# Patient Record
Sex: MALE | Race: WHITE | Employment: UNEMPLOYED | URBAN - METROPOLITAN AREA
[De-identification: names, ages, dates, MRNs, and addresses within clinical notes are randomized per-mention and may not be internally consistent; named-entity substitution may affect disease eponyms.]

---

## 2022-01-18 ENCOUNTER — APPOINTMENT (OUTPATIENT)
Dept: CT IMAGING | Age: 52
End: 2022-01-18
Attending: EMERGENCY MEDICINE
Payer: COMMERCIAL

## 2022-01-18 ENCOUNTER — HOSPITAL ENCOUNTER (EMERGENCY)
Age: 52
Discharge: HOME | End: 2022-01-19
Payer: COMMERCIAL

## 2022-01-18 ENCOUNTER — APPOINTMENT (OUTPATIENT)
Dept: GENERAL RADIOLOGY | Age: 52
End: 2022-01-18
Attending: EMERGENCY MEDICINE
Payer: COMMERCIAL

## 2022-01-18 ENCOUNTER — APPOINTMENT (OUTPATIENT)
Dept: CT IMAGING | Age: 52
End: 2022-01-18
Attending: NURSE PRACTITIONER
Payer: COMMERCIAL

## 2022-01-18 DIAGNOSIS — J90 PLEURAL EFFUSION ON LEFT: Primary | ICD-10-CM

## 2022-01-18 DIAGNOSIS — J18.9 PNEUMONIA OF LEFT LOWER LOBE DUE TO INFECTIOUS ORGANISM: ICD-10-CM

## 2022-01-18 LAB
ALBUMIN SERPL-MCNC: 1.7 G/DL (ref 3.5–5)
ALBUMIN/GLOB SERPL: 0.5 {RATIO} (ref 1.1–2.2)
ALP SERPL-CCNC: 90 U/L (ref 45–117)
ALT SERPL-CCNC: 15 U/L (ref 12–78)
ANION GAP SERPL CALC-SCNC: 9 MMOL/L (ref 5–15)
AST SERPL W P-5'-P-CCNC: 28 U/L (ref 15–37)
BASOPHILS # BLD: 0.1 K/UL (ref 0–0.1)
BASOPHILS NFR BLD: 0 % (ref 0–1)
BILIRUB SERPL-MCNC: 1.1 MG/DL (ref 0.2–1)
BUN SERPL-MCNC: 7 MG/DL (ref 6–20)
BUN/CREAT SERPL: 15 (ref 12–20)
CA-I BLD-MCNC: 6.4 MG/DL (ref 8.5–10.1)
CHLORIDE SERPL-SCNC: 97 MMOL/L (ref 97–108)
CK SERPL-CCNC: 120.6 NG/ML (ref 39–308)
CO2 SERPL-SCNC: 19 MMOL/L (ref 21–32)
CREAT SERPL-MCNC: 0.48 MG/DL (ref 0.7–1.3)
DIFFERENTIAL METHOD BLD: ABNORMAL
EOSINOPHIL # BLD: 0 K/UL (ref 0–0.4)
EOSINOPHIL NFR BLD: 0 % (ref 0–7)
ERYTHROCYTE [DISTWIDTH] IN BLOOD BY AUTOMATED COUNT: 13.9 % (ref 11.5–14.5)
GLOBULIN SER CALC-MCNC: 3.7 G/DL (ref 2–4)
GLUCOSE SERPL-MCNC: 100 MG/DL (ref 65–100)
HCT VFR BLD AUTO: 37.5 % (ref 36.6–50.3)
HGB BLD-MCNC: 13.3 G/DL (ref 12.1–17)
IMM GRANULOCYTES # BLD AUTO: 0.1 K/UL (ref 0–0.04)
IMM GRANULOCYTES NFR BLD AUTO: 1 % (ref 0–0.5)
LYMPHOCYTES # BLD: 1.3 K/UL (ref 0.8–3.5)
LYMPHOCYTES NFR BLD: 8 % (ref 12–49)
MCH RBC QN AUTO: 32.2 PG (ref 26–34)
MCHC RBC AUTO-ENTMCNC: 35.5 G/DL (ref 30–36.5)
MCV RBC AUTO: 90.8 FL (ref 80–99)
MONOCYTES # BLD: 0.8 K/UL (ref 0–1)
MONOCYTES NFR BLD: 5 % (ref 5–13)
NEUTS SEG # BLD: 13.7 K/UL (ref 1.8–8)
NEUTS SEG NFR BLD: 86 % (ref 32–75)
NRBC # BLD: 0 K/UL (ref 0–0.01)
NRBC BLD-RTO: 0 PER 100 WBC
PLATELET # BLD AUTO: 323 K/UL (ref 150–400)
PMV BLD AUTO: 8.9 FL (ref 8.9–12.9)
POTASSIUM SERPL-SCNC: 3.7 MMOL/L (ref 3.5–5.1)
PROT SERPL-MCNC: 5.4 G/DL (ref 6.4–8.2)
RBC # BLD AUTO: 4.13 M/UL (ref 4.1–5.7)
SODIUM SERPL-SCNC: 125 MMOL/L (ref 136–145)
TROPONIN-HIGH SENSITIVITY: 15 NG/L (ref 0–76)
WBC # BLD AUTO: 15.9 K/UL (ref 4.1–11.1)

## 2022-01-18 PROCEDURE — 70450 CT HEAD/BRAIN W/O DYE: CPT

## 2022-01-18 PROCEDURE — 96361 HYDRATE IV INFUSION ADD-ON: CPT

## 2022-01-18 PROCEDURE — 74011250636 HC RX REV CODE- 250/636: Performed by: NURSE PRACTITIONER

## 2022-01-18 PROCEDURE — 80053 COMPREHEN METABOLIC PANEL: CPT

## 2022-01-18 PROCEDURE — 85025 COMPLETE CBC W/AUTO DIFF WBC: CPT

## 2022-01-18 PROCEDURE — 71045 X-RAY EXAM CHEST 1 VIEW: CPT

## 2022-01-18 PROCEDURE — 93005 ELECTROCARDIOGRAM TRACING: CPT

## 2022-01-18 PROCEDURE — 96374 THER/PROPH/DIAG INJ IV PUSH: CPT

## 2022-01-18 PROCEDURE — 74011250637 HC RX REV CODE- 250/637: Performed by: NURSE PRACTITIONER

## 2022-01-18 PROCEDURE — 82550 ASSAY OF CK (CPK): CPT

## 2022-01-18 PROCEDURE — 84484 ASSAY OF TROPONIN QUANT: CPT

## 2022-01-18 PROCEDURE — 74011000250 HC RX REV CODE- 250: Performed by: NURSE PRACTITIONER

## 2022-01-18 PROCEDURE — 96375 TX/PRO/DX INJ NEW DRUG ADDON: CPT

## 2022-01-18 PROCEDURE — 74011000258 HC RX REV CODE- 258: Performed by: NURSE PRACTITIONER

## 2022-01-18 PROCEDURE — 99284 EMERGENCY DEPT VISIT MOD MDM: CPT

## 2022-01-18 PROCEDURE — 36415 COLL VENOUS BLD VENIPUNCTURE: CPT

## 2022-01-18 RX ORDER — LISINOPRIL 20 MG/1
20 TABLET ORAL DAILY
COMMUNITY

## 2022-01-18 RX ORDER — LIDOCAINE HYDROCHLORIDE 20 MG/ML
15 SOLUTION OROPHARYNGEAL
Status: COMPLETED | OUTPATIENT
Start: 2022-01-18 | End: 2022-01-18

## 2022-01-18 RX ORDER — MAG HYDROX/ALUMINUM HYD/SIMETH 200-200-20
30 SUSPENSION, ORAL (FINAL DOSE FORM) ORAL ONCE
Status: COMPLETED | OUTPATIENT
Start: 2022-01-18 | End: 2022-01-18

## 2022-01-18 RX ORDER — ONDANSETRON 2 MG/ML
4 INJECTION INTRAMUSCULAR; INTRAVENOUS
Status: COMPLETED | OUTPATIENT
Start: 2022-01-18 | End: 2022-01-18

## 2022-01-18 RX ORDER — PREGABALIN 100 MG/1
CAPSULE ORAL 2 TIMES DAILY
Status: ON HOLD | COMMUNITY
End: 2022-04-03

## 2022-01-18 RX ADMIN — ALUMINUM HYDROXIDE, MAGNESIUM HYDROXIDE, AND SIMETHICONE 30 ML: 200; 200; 20 SUSPENSION ORAL at 22:32

## 2022-01-18 RX ADMIN — SODIUM CHLORIDE 1000 ML: 9 INJECTION, SOLUTION INTRAVENOUS at 23:18

## 2022-01-18 RX ADMIN — FAMOTIDINE 20 MG: 10 INJECTION INTRAVENOUS at 23:16

## 2022-01-18 RX ADMIN — ONDANSETRON 4 MG: 2 INJECTION INTRAMUSCULAR; INTRAVENOUS at 23:16

## 2022-01-18 RX ADMIN — LIDOCAINE HYDROCHLORIDE 15 ML: 20 SOLUTION ORAL; TOPICAL at 22:32

## 2022-01-19 ENCOUNTER — APPOINTMENT (OUTPATIENT)
Dept: CT IMAGING | Age: 52
End: 2022-01-19
Attending: NURSE PRACTITIONER
Payer: COMMERCIAL

## 2022-01-19 VITALS
BODY MASS INDEX: 31.83 KG/M2 | HEART RATE: 96 BPM | RESPIRATION RATE: 20 BRPM | WEIGHT: 210 LBS | TEMPERATURE: 98.6 F | DIASTOLIC BLOOD PRESSURE: 90 MMHG | SYSTOLIC BLOOD PRESSURE: 181 MMHG | HEIGHT: 68 IN | OXYGEN SATURATION: 97 %

## 2022-01-19 PROCEDURE — 74011000636 HC RX REV CODE- 636: Performed by: NURSE PRACTITIONER

## 2022-01-19 PROCEDURE — 74177 CT ABD & PELVIS W/CONTRAST: CPT

## 2022-01-19 PROCEDURE — 74011250637 HC RX REV CODE- 250/637: Performed by: NURSE PRACTITIONER

## 2022-01-19 RX ORDER — LEVOFLOXACIN 5 MG/ML
500 INJECTION, SOLUTION INTRAVENOUS ONCE
Status: DISCONTINUED | OUTPATIENT
Start: 2022-01-19 | End: 2022-01-19

## 2022-01-19 RX ORDER — PROMETHAZINE HYDROCHLORIDE AND DEXTROMETHORPHAN HYDROBROMIDE 6.25; 15 MG/5ML; MG/5ML
5 SYRUP ORAL
Qty: 120 ML | Refills: 0 | Status: SHIPPED | OUTPATIENT
Start: 2022-01-19 | End: 2022-01-26

## 2022-01-19 RX ORDER — AMOXICILLIN AND CLAVULANATE POTASSIUM 875; 125 MG/1; MG/1
1 TABLET, FILM COATED ORAL
Status: COMPLETED | OUTPATIENT
Start: 2022-01-19 | End: 2022-01-19

## 2022-01-19 RX ORDER — AMOXICILLIN AND CLAVULANATE POTASSIUM 875; 125 MG/1; MG/1
1 TABLET, FILM COATED ORAL 2 TIMES DAILY
Qty: 14 TABLET | Refills: 0 | Status: ON HOLD | OUTPATIENT
Start: 2022-01-19 | End: 2022-04-03

## 2022-01-19 RX ORDER — OMEPRAZOLE 40 MG/1
40 CAPSULE, DELAYED RELEASE ORAL DAILY
Qty: 30 CAPSULE | Refills: 0 | Status: ON HOLD | OUTPATIENT
Start: 2022-01-19 | End: 2022-04-03

## 2022-01-19 RX ORDER — LEVOFLOXACIN 500 MG/1
500 TABLET, FILM COATED ORAL DAILY
Qty: 7 TABLET | Refills: 0 | Status: ON HOLD | OUTPATIENT
Start: 2022-01-19 | End: 2022-04-03

## 2022-01-19 RX ORDER — LEVOFLOXACIN 500 MG/1
500 TABLET, FILM COATED ORAL
Status: COMPLETED | OUTPATIENT
Start: 2022-01-19 | End: 2022-01-19

## 2022-01-19 RX ORDER — ONDANSETRON 4 MG/1
4 TABLET, FILM COATED ORAL
Qty: 10 TABLET | Refills: 0 | Status: ON HOLD | OUTPATIENT
Start: 2022-01-19 | End: 2022-04-03

## 2022-01-19 RX ADMIN — IOPAMIDOL 100 ML: 755 INJECTION, SOLUTION INTRAVENOUS at 00:35

## 2022-01-19 RX ADMIN — AMOXICILLIN AND CLAVULANATE POTASSIUM 1 TABLET: 875; 125 TABLET, FILM COATED ORAL at 02:27

## 2022-01-19 RX ADMIN — LEVOFLOXACIN 500 MG: 500 TABLET, FILM COATED ORAL at 02:27

## 2022-01-19 NOTE — ED PROVIDER NOTES
EMERGENCY DEPARTMENT HISTORY AND PHYSICAL EXAM      Date: 1/18/2022  Patient Name: Shon Blakely III    History of Presenting Illness     Chief Complaint   Patient presents with    Dizziness       History Provided By: Patient    HPI: Shon Blakely III, 54 y.o. male with a past medical history significant hypertension and obesity presents to the ED with cc of syncopal episode. Patient was diagnosed with COVID 3 weeks ago. Patient states he had a test since that was negative. Patient had a presyncopal episode today. Patient called EMS. Patient also complains of GERD. Patient denies shortness of breath. Moderate severity, no known exacerbating or relieving factors, no other associated signs and symptoms    There are no other complaints, changes, or physical findings at this time. PCP: None    No current facility-administered medications on file prior to encounter. Current Outpatient Medications on File Prior to Encounter   Medication Sig Dispense Refill    lisinopriL (PRINIVIL, ZESTRIL) 20 mg tablet Take 20 mg by mouth daily.  pregabalin (Lyrica) 100 mg capsule Take  by mouth two (2) times a day. Pt unsure of dosage         Past History     Past Medical History:  Past Medical History:   Diagnosis Date    COPD (chronic obstructive pulmonary disease) ([facility] [address]     Hypertension        Past Surgical History:  History reviewed. No pertinent surgical history. Family History:  History reviewed. No pertinent family history. Social History:  Social History     Tobacco Use    Smoking status: Current Every Day Smoker     Packs/day: 1.00    Smokeless tobacco: Never Used   Substance Use Topics    Alcohol use: Not on file    Drug use: Not on file       Allergies:  No Known Allergies      Review of Systems     Review of Systems   Constitutional: Positive for fatigue. Negative for chills and fever. HENT: Negative for dental problem and sore throat.     Eyes: Negative for pain and visual disturbance. Respiratory: Negative for cough and chest tightness. Cardiovascular: Negative for chest pain. Gastrointestinal: Positive for vomiting. Negative for diarrhea and nausea. Genitourinary: Negative for difficulty urinating and frequency. Musculoskeletal: Negative for gait problem and joint swelling. Neurological: Positive for dizziness. Negative for numbness and headaches. Hematological: Negative for adenopathy. Does not bruise/bleed easily. Psychiatric/Behavioral: Negative for behavioral problems and suicidal ideas. Physical Exam     Physical Exam  Constitutional:       General: He is not in acute distress. Appearance: Normal appearance. He is obese. He is not ill-appearing or toxic-appearing. HENT:      Head: Normocephalic and atraumatic. Nose: Nose normal.      Mouth/Throat:      Mouth: Mucous membranes are moist.   Eyes:      Extraocular Movements: Extraocular movements intact. Pupils: Pupils are equal, round, and reactive to light. Cardiovascular:      Rate and Rhythm: Normal rate and regular rhythm. Pulmonary:      Effort: Pulmonary effort is normal.      Breath sounds: Normal breath sounds. Abdominal:      General: Bowel sounds are normal.   Musculoskeletal:         General: Normal range of motion. Cervical back: Normal range of motion and neck supple. Skin:     General: Skin is warm and dry. Capillary Refill: Capillary refill takes less than 2 seconds. Neurological:      General: No focal deficit present. Mental Status: He is alert and oriented to person, place, and time.    Psychiatric:         Mood and Affect: Mood normal.         Behavior: Behavior normal.         Lab and Diagnostic Study Results     Labs -     Recent Results (from the past 12 hour(s))   CBC WITH AUTOMATED DIFF    Collection Time: 01/18/22 10:01 PM   Result Value Ref Range    WBC 15.9 (H) 4.1 - 11.1 K/uL    RBC 4.13 4.10 - 5.70 M/uL    HGB 13.3 12.1 - 17.0 g/dL    HCT 37.5 36.6 - 50.3 %    MCV 90.8 80.0 - 99.0 FL    MCH 32.2 26.0 - 34.0 PG    MCHC 35.5 30.0 - 36.5 g/dL    RDW 13.9 11.5 - 14.5 %    PLATELET 739 238 - 590 K/uL    MPV 8.9 8.9 - 12.9 FL    NRBC 0.0 0.0  WBC    ABSOLUTE NRBC 0.00 0.00 - 0.01 K/uL    NEUTROPHILS 86 (H) 32 - 75 %    LYMPHOCYTES 8 (L) 12 - 49 %    MONOCYTES 5 5 - 13 %    EOSINOPHILS 0 0 - 7 %    BASOPHILS 0 0 - 1 %    IMMATURE GRANULOCYTES 1 (H) 0 - 0.5 %    ABS. NEUTROPHILS 13.7 (H) 1.8 - 8.0 K/UL    ABS. LYMPHOCYTES 1.3 0.8 - 3.5 K/UL    ABS. MONOCYTES 0.8 0.0 - 1.0 K/UL    ABS. EOSINOPHILS 0.0 0.0 - 0.4 K/UL    ABS. BASOPHILS 0.1 0.0 - 0.1 K/UL    ABS. IMM. GRANS. 0.1 (H) 0.00 - 0.04 K/UL    DF AUTOMATED     METABOLIC PANEL, COMPREHENSIVE    Collection Time: 01/18/22 10:01 PM   Result Value Ref Range    Sodium 125 (L) 136 - 145 mmol/L    Potassium 3.7 3.5 - 5.1 mmol/L    Chloride 97 97 - 108 mmol/L    CO2 19 (L) 21 - 32 mmol/L    Anion gap 9 5 - 15 mmol/L    Glucose 100 65 - 100 mg/dL    BUN 7 6 - 20 mg/dL    Creatinine 0.48 (L) 0.70 - 1.30 mg/dL    BUN/Creatinine ratio 15 12 - 20      GFR est AA >60 >60 ml/min/1.73m2    GFR est non-AA >60 >60 ml/min/1.73m2    Calcium 6.4 (LL) 8.5 - 10.1 mg/dL    Bilirubin, total 1.1 (H) 0.2 - 1.0 mg/dL    AST (SGOT) 28 15 - 37 U/L    ALT (SGPT) 15 12 - 78 U/L    Alk. phosphatase 90 45 - 117 U/L    Protein, total 5.4 (L) 6.4 - 8.2 g/dL    Albumin 1.7 (L) 3.5 - 5.0 g/dL    Globulin 3.7 2.0 - 4.0 g/dL    A-G Ratio 0.5 (L) 1.1 - 2.2     CK W/ REFLX CKMB    Collection Time: 01/18/22 10:01 PM   Result Value Ref Range    .6 39 - 308 ng/mL   TROPONIN-HIGH SENSITIVITY    Collection Time: 01/18/22 10:01 PM   Result Value Ref Range    Troponin-High Sensitivity 15 0 - 76 ng/L       Radiologic Studies -   @lastxrresult@  CT Results  (Last 48 hours)               01/19/22 0034  CT ABD PELV W CONT Final result    Impression:      Proximal-mid small bowel wall thickening may represent enteritis.  Inflammatory, infectious or other etiologies may be considered in the differential diagnosis. Left pleural effusion. Empyema cannot be excluded. Adjacent opacity may   represent atelectasis, pneumonia or a combination of these. Fatty liver. Atherosclerosis. Renal lesions are too small to characterize. Avascular necrosis of the left femoral head. Follow-up as clinically indicated. Please see full report. Narrative:  CT abdomen and pelvis with intravenous contrast, 100 cc [id number]       Dose reduction: All CT scans at this facility are performed using dose reduction   optimization techniques as appropriate to a performed exam including the   following: Automated exposure control, adjustments of the mA and/or kV according   to patient size, or use of iterative reconstruction technique. INDICATION: Vomiting       FINDINGS:       Small to moderate left-sided pleural effusion with mild pleural enhancement and   adjacent opacity. Minimal atelectasis right lung base. There may be mild   pulmonary emphysema. Probable coronary artery calcification. Mild partially   visualized bilateral gynecomastia. Fatty infiltration of the liver. Spleen is slightly prominent in size. Pancreas   is unremarkable. Gallbladder is present without pericholecystic stranding. No   hydronephrosis on either side. Bilateral subcentimeter renal lesions are too   small to characterize, may represent cyst, hyperdense cyst or other. No aneurysm   of abdominal aorta. Atherosclerosis. There is wall thickening in the proximal-mid small bowel loops with haziness and   fluid in the adjacent mesentery. No shaunna bowel obstruction. There may be few   colonic diverticula without acute diverticulitis. No inflammatory changes to   suggest appendicitis. Small amount of ascites. No abscess. No free   intraperitoneal air. Prostate appears grossly unremarkable based on CT criteria. Artifacts from right hip prosthesis.  Avascular necrosis of the left femoral   head. Ankylosis of the spine and fusion of the sacroiliac joints may be due to   ankylosing spondylitis or other. 01/18/22 2221  CT HEAD WO CONT Final result    Impression:  No acute intracranial abnormality. Narrative:  Study: Head CT without contrast.       Clinical indication: Syncope. Technique: Routine volume acquisition of the head was obtained without IV   contrast. Coronal and sagittal reformations were generated in soft tissue and   bone kernels. Dose reduction: All CT scans at this facility are performed using   dose reduction optimization techniques as appropriate to a performed exam   including the following: Automated exposure control, adjustments of the mA   and/or kV according to patient size, or use of iterative reconstruction   technique. Comparison: Head CT 4/25/2009       Findings:        No evidence of acute intracranial hemorrhage, mass effect, midline shift or   abnormal extra-axial fluid collection. Ventricles and basal cisterns are   preserved and are symmetric. Gray-white matter differentiation is maintained. Moderate intracranial vascular calcifications. No evidence of skull fracture. Visualized paranasal sinuses and mastoid air   cells are clear. Globes and orbits are intact. CXR Results  (Last 48 hours)               01/18/22 2211  XR CHEST PORT Final result    Impression:  Findings/impression:       Cardiac silhouette is enlarged. Mild central vascular congestion. Small left pleural effusion and hazy left basilar airspace disease. No evidence   of pneumothorax. Cervicothoracic fusion hardware partially visualized. Narrative:  Study: XR CHEST PORT       Clinical indication: chest pain       Comparison: None. Medical Decision Making   - I am the first provider for this patient.     - I reviewed the vital signs, available nursing notes, past medical history, past surgical history, family history and social history. - Initial assessment performed. The patients presenting problems have been discussed, and they are in agreement with the care plan formulated and outlined with them. I have encouraged them to ask questions as they arise throughout their visit. Vital Signs-Reviewed the patient's vital signs. Patient Vitals for the past 12 hrs:   Temp Pulse Resp BP SpO2   01/19/22 0231  96 20 (!) 181/90 97 %   01/18/22 2152 98.6 °F (37 °C) 89 20 (!) 143/80 96 %       Records Reviewed: Nursing Notes and Old Medical Records          ED Course:     ED Course as of 01/19/22 0358   Tue Jan 18, 2022   2246 Bilirubin, total(!): 1.1 [CB]   2246 Sodium(!): 125 [CB]      ED Course User Index  [CB] Alan Corado NP       Provider Notes (Medical Decision Making):   Patient presenting with dizziness. Pt has stable vitals with a nonfocal exam. DDx is broad and includes dehydration, orthostatic hypotension, arrhythmia, electrolyte disturbance, ACS, BPPV, labyrinthitis, otitis media, medication toxicity. There are no red flag symptoms such as diplopia, dysmetria, dysarthria, ataxia or unilateral numbness or weakness. Will obtain EKG, labs, check orthostatics, provide IVF's and symptomatic treatment and reassess. MDM  Patient feels better after receiving IV fluids. Patient found to have a left lower lobe pneumonia. Patient also has a pleural effusion and. Patient has a follow-up appointment with his PCP at 9:15 in the morning. Patient instructed on return precautions. Patient given Levaquin and Augmentin in ER. Patient also has a history of hyponatremia. Patient states 125 is not a bad level for him. Patient only vomited after receiving GI cocktail. Patient had no further episodes of nausea after vomiting. Procedures   Medical Decision Makingedical Decision Making  Performed by: Kevon Martell NP  PROCEDURES:  Procedures       Disposition   Disposition: DC- Adult Discharges:  All of the diagnostic tests were reviewed and questions answered. Diagnosis, care plan and treatment options were discussed. The patient understands the instructions and will follow up as directed. The patients results have been reviewed with them. They have been counseled regarding their diagnosis. The patient verbally convey understanding and agreement of the signs, symptoms, diagnosis, treatment and prognosis and additionally agrees to follow up as recommended with their PCP in 24 - 48 hours. They also agree with the care-plan and convey that all of their questions have been answered. I have also put together some discharge instructions for them that include: 1) educational information regarding their diagnosis, 2) how to care for their diagnosis at home, as well a 3) list of reasons why they would want to return to the ED prior to their follow-up appointment, should their condition change. Discharged    DISCHARGE PLAN:  1. Cannot display discharge medications since this patient is not currently admitted. 2.   Follow-up Information    None       3. Return to ED if worse   4. Discharge Medication List as of 1/19/2022  2:18 AM      START taking these medications    Details   omeprazole (PRILOSEC) 40 mg capsule Take 1 Capsule by mouth daily. , Normal, Disp-30 Capsule, R-0      levoFLOXacin (Levaquin) 500 mg tablet Take 1 Tablet by mouth daily. , Normal, Disp-7 Tablet, R-0      amoxicillin-clavulanate (Augmentin) 875-125 mg per tablet Take 1 Tablet by mouth two (2) times a day., Normal, Disp-14 Tablet, R-0      promethazine-dextromethorphan (PROMETHAZINE-DM) 6.25-15 mg/5 mL syrup Take 5 mL by mouth every four (4) hours as needed for Cough for up to 7 days. , Normal, Disp-120 mL, R-0      ondansetron hcl (ZOFRAN) 4 mg tablet Take 1 Tablet by mouth every eight (8) hours as needed for Nausea or Vomiting., Normal, Disp-10 Tablet, R-0         CONTINUE these medications which have NOT CHANGED    Details   lisinopriL (PRINIVIL, ZESTRIL) 20 mg tablet Take 20 mg by mouth daily. , Historical Med      pregabalin (Lyrica) 100 mg capsule Take  by mouth two (2) times a day. Pt unsure of dosage, Historical Med               Diagnosis     Clinical Impression:   1. Pleural effusion on left    2. Pneumonia of left lower lobe due to infectious organism        Attestations:    Georgiana Davison, NP    Please note that this dictation was completed with [employer]ent, the computer voice recognition software. Quite often unanticipated grammatical, syntax, homophones, and other interpretive errors are inadvertently transcribed by the computer software. Please disregard these errors. Please excuse any errors that have escaped final proofreading. Thank you.

## 2022-01-20 LAB
ATRIAL RATE: 91 BPM
CALCULATED P AXIS, ECG09: 47 DEGREES
CALCULATED R AXIS, ECG10: 39 DEGREES
CALCULATED T AXIS, ECG11: 70 DEGREES
DIAGNOSIS, 93000: NORMAL
P-R INTERVAL, ECG05: 146 MS
Q-T INTERVAL, ECG07: 402 MS
QRS DURATION, ECG06: 84 MS
QTC CALCULATION (BEZET), ECG08: 494 MS
VENTRICULAR RATE, ECG03: 91 BPM

## 2022-04-03 ENCOUNTER — HOSPITAL ENCOUNTER (OUTPATIENT)
Age: 52
Setting detail: OBSERVATION
Discharge: HOME | End: 2022-04-04
Attending: EMERGENCY MEDICINE | Admitting: INTERNAL MEDICINE
Payer: COMMERCIAL

## 2022-04-03 ENCOUNTER — APPOINTMENT (OUTPATIENT)
Dept: GENERAL RADIOLOGY | Age: 52
End: 2022-04-03
Attending: EMERGENCY MEDICINE
Payer: COMMERCIAL

## 2022-04-03 DIAGNOSIS — F10.930 ALCOHOL WITHDRAWAL SYNDROME WITHOUT COMPLICATION (HCC): ICD-10-CM

## 2022-04-03 DIAGNOSIS — E87.1 HYPONATREMIA: Primary | ICD-10-CM

## 2022-04-03 LAB
ALBUMIN SERPL-MCNC: 3.3 G/DL (ref 3.5–5)
ALBUMIN SERPL-MCNC: 3.8 G/DL (ref 3.5–5)
ALBUMIN/GLOB SERPL: 0.7 {RATIO} (ref 1.1–2.2)
ALP SERPL-CCNC: 162 U/L (ref 45–117)
ALT SERPL-CCNC: 28 U/L (ref 12–78)
AMORPH CRY URNS QL MICRO: ABNORMAL
ANION GAP SERPL CALC-SCNC: 11 MMOL/L (ref 5–15)
ANION GAP SERPL CALC-SCNC: 12 MMOL/L (ref 5–15)
APPEARANCE UR: CLEAR
AST SERPL W P-5'-P-CCNC: 37 U/L (ref 15–37)
BACTERIA URNS QL MICRO: NEGATIVE /HPF
BASOPHILS # BLD: 0 K/UL (ref 0–0.1)
BASOPHILS NFR BLD: 0 % (ref 0–1)
BILIRUB SERPL-MCNC: 2.2 MG/DL (ref 0.2–1)
BILIRUB UR QL: NEGATIVE
BUN SERPL-MCNC: 7 MG/DL (ref 6–20)
BUN SERPL-MCNC: 7 MG/DL (ref 6–20)
BUN/CREAT SERPL: 8 (ref 12–20)
BUN/CREAT SERPL: 9 (ref 12–20)
CA-I BLD-MCNC: 8.7 MG/DL (ref 8.5–10.1)
CA-I BLD-MCNC: 9.1 MG/DL (ref 8.5–10.1)
CHLORIDE SERPL-SCNC: 79 MMOL/L (ref 97–108)
CHLORIDE SERPL-SCNC: 81 MMOL/L (ref 97–108)
CK SERPL-CCNC: 273 U/L (ref 39–308)
CO2 SERPL-SCNC: 28 MMOL/L (ref 21–32)
CO2 SERPL-SCNC: 28 MMOL/L (ref 21–32)
COLOR UR: YELLOW
CREAT SERPL-MCNC: 0.78 MG/DL (ref 0.7–1.3)
CREAT SERPL-MCNC: 0.9 MG/DL (ref 0.7–1.3)
CREAT UR-MCNC: 117 MG/DL
DIFFERENTIAL METHOD BLD: ABNORMAL
EOSINOPHIL # BLD: 0 K/UL (ref 0–0.4)
EOSINOPHIL NFR BLD: 0 % (ref 0–7)
EPITH CASTS URNS QL MICRO: ABNORMAL /LPF
ERYTHROCYTE [DISTWIDTH] IN BLOOD BY AUTOMATED COUNT: 14.1 % (ref 11.5–14.5)
GLOBULIN SER CALC-MCNC: 5.5 G/DL (ref 2–4)
GLUCOSE SERPL-MCNC: 154 MG/DL (ref 65–100)
GLUCOSE SERPL-MCNC: 172 MG/DL (ref 65–100)
GLUCOSE UR STRIP.AUTO-MCNC: 100 MG/DL
HCT VFR BLD AUTO: 40.3 % (ref 36.6–50.3)
HGB BLD-MCNC: 14.6 G/DL (ref 12.1–17)
HGB UR QL STRIP: NEGATIVE
IMM GRANULOCYTES # BLD AUTO: 0 K/UL (ref 0–0.04)
IMM GRANULOCYTES NFR BLD AUTO: 0 % (ref 0–0.5)
KETONES UR QL STRIP.AUTO: 15 MG/DL
LEUKOCYTE ESTERASE UR QL STRIP.AUTO: NEGATIVE
LYMPHOCYTES # BLD: 0.6 K/UL (ref 0.8–3.5)
LYMPHOCYTES NFR BLD: 8 % (ref 12–49)
MCH RBC QN AUTO: 33.2 PG (ref 26–34)
MCHC RBC AUTO-ENTMCNC: 36.2 G/DL (ref 30–36.5)
MCV RBC AUTO: 91.6 FL (ref 80–99)
MONOCYTES # BLD: 0.5 K/UL (ref 0–1)
MONOCYTES NFR BLD: 6 % (ref 5–13)
NEUTS SEG # BLD: 6.4 K/UL (ref 1.8–8)
NEUTS SEG NFR BLD: 86 % (ref 32–75)
NITRITE UR QL STRIP.AUTO: NEGATIVE
PH UR STRIP: 8 [PH] (ref 5–8)
PHOSPHATE SERPL-MCNC: 3.1 MG/DL (ref 2.6–4.7)
PLATELET # BLD AUTO: 121 K/UL (ref 150–400)
PMV BLD AUTO: 8.6 FL (ref 8.9–12.9)
POTASSIUM SERPL-SCNC: 3.4 MMOL/L (ref 3.5–5.1)
POTASSIUM SERPL-SCNC: 3.4 MMOL/L (ref 3.5–5.1)
PROT SERPL-MCNC: 9.3 G/DL (ref 6.4–8.2)
PROT UR STRIP-MCNC: ABNORMAL MG/DL
RBC # BLD AUTO: 4.4 M/UL (ref 4.1–5.7)
RBC #/AREA URNS HPF: ABNORMAL /HPF (ref 0–5)
SODIUM SERPL-SCNC: 119 MMOL/L (ref 136–145)
SODIUM SERPL-SCNC: 120 MMOL/L (ref 136–145)
SODIUM UR-SCNC: 33 MMOL/L
SP GR UR REFRACTOMETRY: 1.01 (ref 1–1.03)
TROPONIN-HIGH SENSITIVITY: 38 NG/L (ref 0–76)
TSH SERPL DL<=0.05 MIU/L-ACNC: 0.95 UIU/ML (ref 0.36–3.74)
URATE SERPL-MCNC: 3.4 MG/DL (ref 3.5–7.2)
UROBILINOGEN UR QL STRIP.AUTO: 0.1 EU/DL (ref 0.2–1)
WBC # BLD AUTO: 7.5 K/UL (ref 4.1–11.1)
WBC URNS QL MICRO: ABNORMAL /HPF (ref 0–4)

## 2022-04-03 PROCEDURE — 84550 ASSAY OF BLOOD/URIC ACID: CPT

## 2022-04-03 PROCEDURE — 74011000250 HC RX REV CODE- 250: Performed by: HOSPITALIST

## 2022-04-03 PROCEDURE — 71045 X-RAY EXAM CHEST 1 VIEW: CPT

## 2022-04-03 PROCEDURE — 85025 COMPLETE CBC W/AUTO DIFF WBC: CPT

## 2022-04-03 PROCEDURE — 36415 COLL VENOUS BLD VENIPUNCTURE: CPT

## 2022-04-03 PROCEDURE — 96372 THER/PROPH/DIAG INJ SC/IM: CPT

## 2022-04-03 PROCEDURE — 74011250637 HC RX REV CODE- 250/637: Performed by: HOSPITALIST

## 2022-04-03 PROCEDURE — 74011250636 HC RX REV CODE- 250/636: Performed by: HOSPITALIST

## 2022-04-03 PROCEDURE — 99285 EMERGENCY DEPT VISIT HI MDM: CPT

## 2022-04-03 PROCEDURE — 82570 ASSAY OF URINE CREATININE: CPT

## 2022-04-03 PROCEDURE — 84484 ASSAY OF TROPONIN QUANT: CPT

## 2022-04-03 PROCEDURE — 96361 HYDRATE IV INFUSION ADD-ON: CPT

## 2022-04-03 PROCEDURE — 74011250636 HC RX REV CODE- 250/636: Performed by: EMERGENCY MEDICINE

## 2022-04-03 PROCEDURE — 84443 ASSAY THYROID STIM HORMONE: CPT

## 2022-04-03 PROCEDURE — 80053 COMPREHEN METABOLIC PANEL: CPT

## 2022-04-03 PROCEDURE — 82550 ASSAY OF CK (CPK): CPT

## 2022-04-03 PROCEDURE — G0378 HOSPITAL OBSERVATION PER HR: HCPCS

## 2022-04-03 PROCEDURE — 80069 RENAL FUNCTION PANEL: CPT

## 2022-04-03 PROCEDURE — 84300 ASSAY OF URINE SODIUM: CPT

## 2022-04-03 PROCEDURE — 74011250637 HC RX REV CODE- 250/637: Performed by: EMERGENCY MEDICINE

## 2022-04-03 PROCEDURE — 96374 THER/PROPH/DIAG INJ IV PUSH: CPT

## 2022-04-03 PROCEDURE — 65270000029 HC RM PRIVATE

## 2022-04-03 PROCEDURE — 94640 AIRWAY INHALATION TREATMENT: CPT

## 2022-04-03 PROCEDURE — 93005 ELECTROCARDIOGRAM TRACING: CPT

## 2022-04-03 PROCEDURE — 96375 TX/PRO/DX INJ NEW DRUG ADDON: CPT

## 2022-04-03 PROCEDURE — 81001 URINALYSIS AUTO W/SCOPE: CPT

## 2022-04-03 RX ORDER — ASPIRIN 325 MG/1
100 TABLET, FILM COATED ORAL DAILY
Status: DISCONTINUED | OUTPATIENT
Start: 2022-04-04 | End: 2022-04-04 | Stop reason: HOSPADM

## 2022-04-03 RX ORDER — IBUPROFEN 200 MG
1 TABLET ORAL DAILY
Status: DISCONTINUED | OUTPATIENT
Start: 2022-04-04 | End: 2022-04-04 | Stop reason: HOSPADM

## 2022-04-03 RX ORDER — ALBUTEROL SULFATE 90 UG/1
2 AEROSOL, METERED RESPIRATORY (INHALATION)
Status: DISCONTINUED | OUTPATIENT
Start: 2022-04-03 | End: 2022-04-04 | Stop reason: HOSPADM

## 2022-04-03 RX ORDER — LISINOPRIL 10 MG/1
20 TABLET ORAL
Status: COMPLETED | OUTPATIENT
Start: 2022-04-03 | End: 2022-04-03

## 2022-04-03 RX ORDER — CHLORDIAZEPOXIDE HYDROCHLORIDE 25 MG/1
25 CAPSULE, GELATIN COATED ORAL 4 TIMES DAILY
Status: DISCONTINUED | OUTPATIENT
Start: 2022-04-03 | End: 2022-04-04 | Stop reason: HOSPADM

## 2022-04-03 RX ORDER — DOCUSATE SODIUM 100 MG/1
100 CAPSULE, LIQUID FILLED ORAL 2 TIMES DAILY
Status: DISCONTINUED | OUTPATIENT
Start: 2022-04-03 | End: 2022-04-04 | Stop reason: HOSPADM

## 2022-04-03 RX ORDER — SODIUM CHLORIDE 9 MG/ML
1000 INJECTION, SOLUTION INTRAVENOUS CONTINUOUS
Status: DISCONTINUED | OUTPATIENT
Start: 2022-04-03 | End: 2022-04-03

## 2022-04-03 RX ORDER — LABETALOL HCL 20 MG/4 ML
20 SYRINGE (ML) INTRAVENOUS ONCE
Status: COMPLETED | OUTPATIENT
Start: 2022-04-03 | End: 2022-04-03

## 2022-04-03 RX ORDER — ENOXAPARIN SODIUM 100 MG/ML
40 INJECTION SUBCUTANEOUS EVERY 24 HOURS
Status: DISCONTINUED | OUTPATIENT
Start: 2022-04-03 | End: 2022-04-04 | Stop reason: HOSPADM

## 2022-04-03 RX ORDER — PANTOPRAZOLE SODIUM 40 MG/1
40 TABLET, DELAYED RELEASE ORAL
Status: DISCONTINUED | OUTPATIENT
Start: 2022-04-04 | End: 2022-04-04 | Stop reason: HOSPADM

## 2022-04-03 RX ORDER — SODIUM CHLORIDE 0.9 % (FLUSH) 0.9 %
5-40 SYRINGE (ML) INJECTION AS NEEDED
Status: DISCONTINUED | OUTPATIENT
Start: 2022-04-03 | End: 2022-04-04 | Stop reason: HOSPADM

## 2022-04-03 RX ORDER — SODIUM CHLORIDE 0.9 % (FLUSH) 0.9 %
5-40 SYRINGE (ML) INJECTION EVERY 8 HOURS
Status: DISCONTINUED | OUTPATIENT
Start: 2022-04-03 | End: 2022-04-04 | Stop reason: HOSPADM

## 2022-04-03 RX ORDER — ALBUTEROL SULFATE 90 UG/1
2 AEROSOL, METERED RESPIRATORY (INHALATION)
COMMUNITY
Start: 2022-03-15

## 2022-04-03 RX ORDER — FLUTICASONE FUROATE, UMECLIDINIUM BROMIDE AND VILANTEROL TRIFENATATE 100; 62.5; 25 UG/1; UG/1; UG/1
1 POWDER RESPIRATORY (INHALATION) DAILY
COMMUNITY
Start: 2021-12-20

## 2022-04-03 RX ORDER — POTASSIUM CHLORIDE 750 MG/1
40 TABLET, FILM COATED, EXTENDED RELEASE ORAL
Status: COMPLETED | OUTPATIENT
Start: 2022-04-04 | End: 2022-04-04

## 2022-04-03 RX ORDER — POTASSIUM CHLORIDE 750 MG/1
40 TABLET, FILM COATED, EXTENDED RELEASE ORAL
Status: COMPLETED | OUTPATIENT
Start: 2022-04-03 | End: 2022-04-03

## 2022-04-03 RX ORDER — ADALIMUMAB 40MG/0.4ML
40 KIT SUBCUTANEOUS
COMMUNITY
Start: 2022-03-28

## 2022-04-03 RX ORDER — ONDANSETRON 2 MG/ML
4 INJECTION INTRAMUSCULAR; INTRAVENOUS
Status: COMPLETED | OUTPATIENT
Start: 2022-04-03 | End: 2022-04-03

## 2022-04-03 RX ORDER — VENLAFAXINE HYDROCHLORIDE 75 MG/1
75 CAPSULE, EXTENDED RELEASE ORAL DAILY
COMMUNITY
Start: 2022-04-01

## 2022-04-03 RX ORDER — CARVEDILOL 3.12 MG/1
6.25 TABLET ORAL 2 TIMES DAILY
Status: DISCONTINUED | OUTPATIENT
Start: 2022-04-03 | End: 2022-04-04 | Stop reason: HOSPADM

## 2022-04-03 RX ORDER — SODIUM CHLORIDE 9 MG/ML
125 INJECTION, SOLUTION INTRAVENOUS CONTINUOUS
Status: DISPENSED | OUTPATIENT
Start: 2022-04-03 | End: 2022-04-04

## 2022-04-03 RX ORDER — LORAZEPAM 2 MG/ML
1 INJECTION INTRAMUSCULAR
Status: DISCONTINUED | OUTPATIENT
Start: 2022-04-03 | End: 2022-04-04 | Stop reason: HOSPADM

## 2022-04-03 RX ORDER — ACETAMINOPHEN 325 MG/1
650 TABLET ORAL
Status: DISCONTINUED | OUTPATIENT
Start: 2022-04-03 | End: 2022-04-04 | Stop reason: HOSPADM

## 2022-04-03 RX ADMIN — DOCUSATE SODIUM 100 MG: 100 CAPSULE, LIQUID FILLED ORAL at 20:01

## 2022-04-03 RX ADMIN — ENOXAPARIN SODIUM 40 MG: 40 INJECTION SUBCUTANEOUS at 20:01

## 2022-04-03 RX ADMIN — POTASSIUM CHLORIDE 40 MEQ: 750 TABLET, FILM COATED, EXTENDED RELEASE ORAL at 20:01

## 2022-04-03 RX ADMIN — LISINOPRIL 20 MG: 10 TABLET ORAL at 14:04

## 2022-04-03 RX ADMIN — CARVEDILOL 6.25 MG: 3.12 TABLET, FILM COATED ORAL at 20:02

## 2022-04-03 RX ADMIN — NITROGLYCERIN 0.5 INCH: 20 OINTMENT TOPICAL at 20:01

## 2022-04-03 RX ADMIN — SODIUM CHLORIDE 1000 ML: 9 INJECTION, SOLUTION INTRAVENOUS at 13:49

## 2022-04-03 RX ADMIN — SODIUM CHLORIDE, PRESERVATIVE FREE 10 ML: 5 INJECTION INTRAVENOUS at 22:43

## 2022-04-03 RX ADMIN — ALBUTEROL SULFATE 2 PUFF: 108 INHALANT RESPIRATORY (INHALATION) at 20:48

## 2022-04-03 RX ADMIN — LABETALOL HYDROCHLORIDE 20 MG: 5 INJECTION, SOLUTION INTRAVENOUS at 15:00

## 2022-04-03 RX ADMIN — CHLORDIAZEPOXIDE HYDROCHLORIDE 25 MG: 25 CAPSULE ORAL at 20:01

## 2022-04-03 RX ADMIN — ONDANSETRON 4 MG: 2 INJECTION INTRAMUSCULAR; INTRAVENOUS at 13:49

## 2022-04-03 RX ADMIN — SODIUM CHLORIDE 125 ML/HR: 9 INJECTION, SOLUTION INTRAVENOUS at 20:01

## 2022-04-03 NOTE — H&P
History and Physical              Subjective :   Chief Complaint : Nausea/vomiting for 2 days    Source of information : Patient, ED provider. History of present illness:   50 y.o. male history of hypertension, COPD presents to the emergency room complaining of significant nausea associated vomiting and unable to keep anything down. Denies anything in particular triggered, no fever, chills, cough or trouble breathing. No exacerbating relieving factors. He has some shortness of breath at baseline with activity with no change. States he is not even able to drink his alcohol for 4 days, usually he drinks 12 to 15 cans daily. He has no previous history of withdrawals but but does not remember if he ever stopped drinking any day. Started feeling dizzy and lightheaded with activity, significant fatigue and generalized weakness. Evaluation found with hyponatremia on laboratory data mild hypokalemia with signs of dehydration. Admitted for further management. Past Medical History:   Diagnosis Date    COPD (chronic obstructive pulmonary disease) ([facility] [address]     Hypertension      Surgical history: Spine surgery cervical and lumbar. Family history: He denies any significant medical problems in the family    Social History     Tobacco Use    Smoking status: Current Every Day Smoker     Packs/day: 1.50    Smokeless tobacco: Never Used   Substance Use Topics    Alcohol use: Yes     Comment: 12-15 beers a day       Prior to Admission medications    Medication Sig Start Date End Date Taking? Authorizing Provider   fluticasone-umeclidinium-vilanterol (Trelegy Ellipta) 100-62.5-25 mcg inhaler Take 1 Puff by inhalation daily. 12/20/21  Yes Provider, Historical   lisinopriL (PRINIVIL, ZESTRIL) 20 mg tablet Take 20 mg by mouth daily.    Yes Other, MD Louie   albuterol (PROVENTIL HFA, VENTOLIN HFA, PROAIR HFA) 90 mcg/actuation inhaler Take 2 Puffs by inhalation every four (4) hours as needed for Shortness of Breath or Wheezing. 3/15/22   Provider, Historical   Humira,CF, Pen 40 mg/0.4 mL injection pen 40 mg by SubCUTAneous route every fourteen (14) days. 3/28/22   Provider, Historical   venlafaxine-SR (EFFEXOR-XR) 75 mg capsule Take 75 mg by mouth daily. 4/1/22   Provider, Historical     Allergies: No known medication allergies         Review of Systems:  Constitutional: Appetite is usually good but unable to tolerate any diet for last 4 days. No fever, no chills, no night sweats. Eye: No recent visual disturbances, no discharge, no double vision. Ear/nose/mouth/throat : No hearing disturbance, no ear pain, no nasal congestion, no sore throat, no trouble swallowing. Respiratory : No trouble breathing, no cough, ++ shortness of breath with exertion, no hemoptysis, no wheezing. Cardiovascular : No chest pain, no palpitation,  no orthopnea,  no peripheral edema. Gastrointestinal : No nausea, no vomiting,  No abdominal pain. Genitourinary : No dysuria, no hematuria, no increased frequency, No incontinence. Lymphatics : No swollen glands -Neck, axillary, inguinal.  Endocrine : No excessive thirst, No polyuria   Immunologic :  No seasonal allergies. Musculoskeletal : Admits joint pain but no swelling or effusion. Integumentary : No rash, No pruritus, No ecchymosis. Hematology : No petechiae, No easy bruising,  No tendency to bleed easy. Neurology : Denies change in mental status, No confusion, No numbness or tingling. Psychiatric : No mood swings, No anxiety, No depression. Vitals:     Patient Vitals for the past 12 hrs:   Temp Pulse Resp BP SpO2   04/03/22 1720  (!) 113  (!) 172/85    04/03/22 1627 97.9 °F (36.6 °C) 71 22  96 %   04/03/22 1510  85 19 (!) 180/93 96 %   04/03/22 1457  90 16 (!) 213/105 97 %   04/03/22 1404  84  (!) 228/109    04/03/22 1401   17 (!) 228/109 96 %   04/03/22 1318 98.3 °F (36.8 °C)           Physical Exam:   General : Looks tired, no respiratory distress noted.   HEENT : PERRLA, normal oral mucosa, atraumatic normocephalic, Normal ear and nose. Neck : Supple, no JVD, no masses noted, no carotid bruit. Lungs : Breath sounds with moderate air entry bilaterally, no wheezes or rales, no accessory muscle use. CVS : Rhythm rate regular, S1+, S2+, no murmur or gallop. Abdomen : Soft, nontender, obese bowel sounds active. Extremities : No edema noted,  pedal pulses palpable. Musculoskeletal : Fair range of motion, no joint swelling or effusion, muscle tone and power appears normal.   Skin : Dry, poor skin turgor. Neck and face with erythematous hue. .  Lymphatic : No cervical lymphadenopathy. Neurological : Awake, alert, oriented to time place person. No neurological deficits. Psychiatric : Mood and affect appears appropriate to the situation. Data Review:   Recent Results (from the past 24 hour(s))   CBC WITH AUTOMATED DIFF    Collection Time: 04/03/22  1:45 PM   Result Value Ref Range    WBC 7.5 4.1 - 11.1 K/uL    RBC 4.40 4. 10 - 5.70 M/uL    HGB 14.6 12.1 - 17.0 g/dL    HCT 40.3 36.6 - 50.3 %    MCV 91.6 80.0 - 99.0 FL    MCH 33.2 26.0 - 34.0 PG    MCHC 36.2 30.0 - 36.5 g/dL    RDW 14.1 11.5 - 14.5 %    PLATELET 560 (L) 798 - 400 K/uL    MPV 8.6 (L) 8.9 - 12.9 FL    NEUTROPHILS 86 (H) 32 - 75 %    LYMPHOCYTES 8 (L) 12 - 49 %    MONOCYTES 6 5 - 13 %    EOSINOPHILS 0 0 - 7 %    BASOPHILS 0 0 - 1 %    IMMATURE GRANULOCYTES 0 0.0 - 0.5 %    ABS. NEUTROPHILS 6.4 1.8 - 8.0 K/UL    ABS. LYMPHOCYTES 0.6 (L) 0.8 - 3.5 K/UL    ABS. MONOCYTES 0.5 0.0 - 1.0 K/UL    ABS. EOSINOPHILS 0.0 0.0 - 0.4 K/UL    ABS. BASOPHILS 0.0 0.0 - 0.1 K/UL    ABS. IMM.  GRANS. 0.0 0.00 - 0.04 K/UL    DF AUTOMATED     METABOLIC PANEL, COMPREHENSIVE    Collection Time: 04/03/22  1:45 PM   Result Value Ref Range    Sodium 119 (LL) 136 - 145 mmol/L    Potassium 3.4 (L) 3.5 - 5.1 mmol/L    Chloride 79 (L) 97 - 108 mmol/L    CO2 28 21 - 32 mmol/L    Anion gap 12 5 - 15 mmol/L    Glucose 172 (H) 65 - 100 mg/dL    BUN 7 6 - 20 mg/dL    Creatinine 0.78 0.70 - 1.30 mg/dL    BUN/Creatinine ratio 9 (L) 12 - 20      GFR est AA >60 >60 ml/min/1.73m2    GFR est non-AA >60 >60 ml/min/1.73m2    Calcium 9.1 8.5 - 10.1 mg/dL    Bilirubin, total 2.2 (H) 0.2 - 1.0 mg/dL    AST (SGOT) 37 15 - 37 U/L    ALT (SGPT) 28 12 - 78 U/L    Alk. phosphatase 162 (H) 45 - 117 U/L    Protein, total 9.3 (H) 6.4 - 8.2 g/dL    Albumin 3.8 3.5 - 5.0 g/dL    Globulin 5.5 (H) 2.0 - 4.0 g/dL    A-G Ratio 0.7 (L) 1.1 - 2.2     URINALYSIS W/ RFLX MICROSCOPIC    Collection Time: 04/03/22  1:45 PM   Result Value Ref Range    Color Yellow      Appearance Clear Clear      Specific gravity 1.015 1.003 - 1.030      pH (UA) 8.0 5.0 - 8.0      Protein Trace (A) Negative mg/dL    Glucose 100 (A) Negative mg/dL    Ketone 15 (A) Negative mg/dL    Bilirubin Negative Negative      Blood Negative Negative      Urobilinogen 0.1 (L) 0.2 - 1.0 EU/dL    Nitrites Negative Negative      Leukocyte Esterase Negative Negative     TROPONIN-HIGH SENSITIVITY    Collection Time: 04/03/22  1:45 PM   Result Value Ref Range    Troponin-High Sensitivity 38 0 - 76 ng/L   URINE MICROSCOPIC    Collection Time: 04/03/22  1:45 PM   Result Value Ref Range    WBC 0-4 0 - 4 /hpf    RBC 0-5 0 - 5 /hpf    Epithelial cells Few Few /lpf    Bacteria Negative Negative /hpf    Amorphous Crystals 1+ (A) Negative       Radiologic Studies :     CXR Results  (Last 48 hours)               04/03/22 1338  XR CHEST PORT Final result    Narrative:  Chest single view. Comparison single view chest January 18, 2022. Similar coarse reticular markings central and basilar lungs. No gross   interstitial or alveolar pulmonary edema. Cardiac and mediastinal structures   unchanged. Mild blunting left costophrenic angle may be related to small left pleural   effusion and/or pleural thickening/scar. No pneumothorax. Partially imaged cervical hardware.                Assessment and Plan : Hyponatremia associated with hypovolemia: Already given a bolus IV fluids in the emergency room, we will continue maintenance fluids and monitor closely    Alcoholic hepatitis with mildly elevated bilirubin and no significant liver enzyme elevation, explained about importance of quit drinking alcohol. Patient already thinking about it. Hypokalemia mild: Ordered oral supplementation. Chronic bronchitis/obstructive pulmonary disease: No signs of exacerbation, continue inhalers from home medications    Mild benign essential hypertension: On lisinopril which we will continue    Patient is admitted to cardiac telemetry, full CODE STATUS, home medications reviewed and verified. CC : None  Signed By: Irineo Isabel MD     April 3, 2022      This dictation was done by dragon, computer voice recognition software. Often unanticipated grammatical, syntax, [place] phones and other interpretive errors are inadvertently transcribed. Please excuse errors that have escaped final proofreading.

## 2022-04-03 NOTE — Clinical Note
Status[de-identified] INPATIENT [101]   Type of Bed: Remote Telemetry [29]   Cardiac Monitoring Required?: Yes   Inpatient Hospitalization Certified Necessary for the Following Reasons: 9.  Other (further clarification in H&P documentation)   Admitting Diagnosis: Hyponatremia [179030]   Admitting Physician: Cary Walsh [[id number]   Attending Physician: Cary Walsh [[id number]]   Estimated Length of Stay: 2 Midnights   Discharge Plan[de-identified] Home with Office Follow-up

## 2022-04-03 NOTE — ED PROVIDER NOTES
EMERGENCY DEPARTMENT HISTORY AND PHYSICAL EXAM      Date: 4/3/2022  Patient Name: Melissa Lala III      History of Presenting Illness     Chief Complaint   Patient presents with    Vomiting    Diarrhea    Chills    Dizziness       History Provided By: Patient    HPI: Melissa Lala III, 50 y.o. male with a past medical history significant hypertension, COPD and EtOH abuse presents to the ED with cc of nausea, vomiting x2 days. Patient reports no inciting event. He states that he feels associated weakness and dizziness. Patient states that the last time he felt similar symptoms, he was found to be hyponatremic. Patient reports that he drinks approximately 12-15 beers a day and has not had anything to drink for the last 2 days. He reports no history of alcohol withdrawal symptoms. Patient found to be hypertensive on arrival to triage, however patient states that he has been not been taking his lisinopril for the last 2 days secondary to his persistent nausea and vomiting. There are no other complaints, changes, or physical findings at this time. PCP: None    Current Outpatient Medications   Medication Sig Dispense Refill    lisinopriL (PRINIVIL, ZESTRIL) 20 mg tablet Take 20 mg by mouth daily.  omeprazole (PRILOSEC) 40 mg capsule Take 1 Capsule by mouth daily. (Patient not taking: Reported on 4/3/2022) 30 Capsule 0    levoFLOXacin (Levaquin) 500 mg tablet Take 1 Tablet by mouth daily. (Patient not taking: Reported on 4/3/2022) 7 Tablet 0    amoxicillin-clavulanate (Augmentin) 875-125 mg per tablet Take 1 Tablet by mouth two (2) times a day. (Patient not taking: Reported on 4/3/2022) 14 Tablet 0    ondansetron hcl (ZOFRAN) 4 mg tablet Take 1 Tablet by mouth every eight (8) hours as needed for Nausea or Vomiting. (Patient not taking: Reported on 4/3/2022) 10 Tablet 0    pregabalin (Lyrica) 100 mg capsule Take  by mouth two (2) times a day.  Pt unsure of dosage (Patient not taking: Reported on 4/3/2022)         Past History     Past Medical History:  Past Medical History:   Diagnosis Date    COPD (chronic obstructive pulmonary disease) ([facility] [address]     Hypertension        Past Surgical History:  History reviewed. No pertinent surgical history. Family History:  History reviewed. No pertinent family history. Social History:  Social History     Tobacco Use    Smoking status: Current Every Day Smoker     Packs/day: 1.50    Smokeless tobacco: Never Used   Substance Use Topics    Alcohol use: Yes     Comment: 12-15 beers a day    Drug use: Not on file       Allergies:  No Known Allergies      Review of Systems     Review of Systems   Constitutional: Positive for fatigue. Negative for chills and fever. HENT: Negative for congestion and rhinorrhea. Eyes: Negative for photophobia and visual disturbance. Respiratory: Negative for cough and shortness of breath. Cardiovascular: Negative for chest pain and palpitations. Gastrointestinal: Positive for diarrhea, nausea and vomiting. Negative for abdominal pain. Genitourinary: Negative for difficulty urinating and dysuria. Musculoskeletal: Negative for arthralgias and myalgias. Skin: Negative for color change and rash. Neurological: Positive for dizziness and light-headedness. Negative for weakness and headaches. Psychiatric/Behavioral: Negative for dysphoric mood and sleep disturbance. Physical Exam     Physical Exam  Constitutional:       General: He is not in acute distress. Appearance: Normal appearance. He is obese. He is ill-appearing. HENT:      Head: Normocephalic and atraumatic. Right Ear: External ear normal.      Left Ear: External ear normal.      Nose: Nose normal.      Mouth/Throat:      Mouth: Mucous membranes are moist.   Eyes:      Extraocular Movements: Extraocular movements intact. Conjunctiva/sclera: Conjunctivae normal.   Cardiovascular:      Rate and Rhythm: Normal rate and regular rhythm. Pulses: Normal pulses. Pulmonary:      Effort: Pulmonary effort is normal. No respiratory distress. Breath sounds: Normal breath sounds. Abdominal:      General: Bowel sounds are normal. There is no distension. Tenderness: There is no abdominal tenderness. There is no guarding. Musculoskeletal:         General: Normal range of motion. Cervical back: Normal range of motion. Skin:     General: Skin is warm and dry. Neurological:      General: No focal deficit present. Mental Status: He is alert and oriented to person, place, and time. Psychiatric:         Mood and Affect: Mood normal.         Behavior: Behavior normal.         Thought Content: Thought content normal.         Judgment: Judgment normal.         Lab and Diagnostic Study Results     Labs -     Recent Results (from the past 12 hour(s))   CBC WITH AUTOMATED DIFF    Collection Time: 04/03/22  1:45 PM   Result Value Ref Range    WBC 7.5 4.1 - 11.1 K/uL    RBC 4.40 4. 10 - 5.70 M/uL    HGB 14.6 12.1 - 17.0 g/dL    HCT 40.3 36.6 - 50.3 %    MCV 91.6 80.0 - 99.0 FL    MCH 33.2 26.0 - 34.0 PG    MCHC 36.2 30.0 - 36.5 g/dL    RDW 14.1 11.5 - 14.5 %    PLATELET 122 (L) 427 - 400 K/uL    MPV 8.6 (L) 8.9 - 12.9 FL    NEUTROPHILS 86 (H) 32 - 75 %    LYMPHOCYTES 8 (L) 12 - 49 %    MONOCYTES 6 5 - 13 %    EOSINOPHILS 0 0 - 7 %    BASOPHILS 0 0 - 1 %    IMMATURE GRANULOCYTES 0 0.0 - 0.5 %    ABS. NEUTROPHILS 6.4 1.8 - 8.0 K/UL    ABS. LYMPHOCYTES 0.6 (L) 0.8 - 3.5 K/UL    ABS. MONOCYTES 0.5 0.0 - 1.0 K/UL    ABS. EOSINOPHILS 0.0 0.0 - 0.4 K/UL    ABS. BASOPHILS 0.0 0.0 - 0.1 K/UL    ABS. IMM.  GRANS. 0.0 0.00 - 0.04 K/UL    DF AUTOMATED     METABOLIC PANEL, COMPREHENSIVE    Collection Time: 04/03/22  1:45 PM   Result Value Ref Range    Sodium 119 (LL) 136 - 145 mmol/L    Potassium 3.4 (L) 3.5 - 5.1 mmol/L    Chloride 79 (L) 97 - 108 mmol/L    CO2 28 21 - 32 mmol/L    Anion gap 12 5 - 15 mmol/L    Glucose 172 (H) 65 - 100 mg/dL    BUN 7 6 - 20 mg/dL    Creatinine 0.78 0.70 - 1.30 mg/dL    BUN/Creatinine ratio 9 (L) 12 - 20      GFR est AA >60 >60 ml/min/1.73m2    GFR est non-AA >60 >60 ml/min/1.73m2    Calcium 9.1 8.5 - 10.1 mg/dL    Bilirubin, total 2.2 (H) 0.2 - 1.0 mg/dL    AST (SGOT) 37 15 - 37 U/L    ALT (SGPT) 28 12 - 78 U/L    Alk. phosphatase 162 (H) 45 - 117 U/L    Protein, total 9.3 (H) 6.4 - 8.2 g/dL    Albumin 3.8 3.5 - 5.0 g/dL    Globulin 5.5 (H) 2.0 - 4.0 g/dL    A-G Ratio 0.7 (L) 1.1 - 2.2     URINALYSIS W/ RFLX MICROSCOPIC    Collection Time: 04/03/22  1:45 PM   Result Value Ref Range    Color Yellow      Appearance Clear Clear      Specific gravity 1.015 1.003 - 1.030      pH (UA) 8.0 5.0 - 8.0      Protein Trace (A) Negative mg/dL    Glucose 100 (A) Negative mg/dL    Ketone 15 (A) Negative mg/dL    Bilirubin Negative Negative      Blood Negative Negative      Urobilinogen 0.1 (L) 0.2 - 1.0 EU/dL    Nitrites Negative Negative      Leukocyte Esterase Negative Negative     TROPONIN-HIGH SENSITIVITY    Collection Time: 04/03/22  1:45 PM   Result Value Ref Range    Troponin-High Sensitivity 38 0 - 76 ng/L   URINE MICROSCOPIC    Collection Time: 04/03/22  1:45 PM   Result Value Ref Range    WBC 0-4 0 - 4 /hpf    RBC 0-5 0 - 5 /hpf    Epithelial cells Few Few /lpf    Bacteria Negative Negative /hpf    Amorphous Crystals 1+ (A) Negative       Radiologic Studies -   [unfilled]  CT Results  (Last 48 hours)    None        CXR Results  (Last 48 hours)               04/03/22 1338  XR CHEST PORT Final result    Narrative:  Chest single view. Comparison single view chest January 18, 2022. Similar coarse reticular markings central and basilar lungs. No gross   interstitial or alveolar pulmonary edema. Cardiac and mediastinal structures   unchanged. Mild blunting left costophrenic angle may be related to small left pleural   effusion and/or pleural thickening/scar. No pneumothorax. Partially imaged cervical hardware. Medical Decision Making and ED Course   - I am the first and primary provider for this patient AND AM THE PRIMARY PROVIDER OF RECORD. - I reviewed the vital signs, available nursing notes, past medical history, past surgical history, family history and social history. - Initial assessment performed. The patients presenting problems have been discussed, and the staff are in agreement with the care plan formulated and outlined with them. I have encouraged them to ask questions as they arise throughout their visit. Vital Signs-Reviewed the patient's vital signs. Patient Vitals for the past 12 hrs:   Pulse Resp BP SpO2   04/03/22 1404 84  (!) 228/109    04/03/22 1401  17 (!) 228/109 96 %       EKG interpretation: (Preliminary): Performed at 1330, and read at 1337  Sinus rhythm with a ventricular 81, , QRS 84, QTc 508 without evidence of ST depression or elevation. Normal axis. Records Reviewed: Nursing Notes    The patient presents with dizziness with a differential diagnosis of  cardiac dysrhythm, generalized weakness, GI bleed/hypovolemia, hypertension, syncope/loss of consciousness and electrolyte abnormality    ED Course:              Provider Notes (Medical Decision Making):   53-year-old male with past medical history significant for COPD, hypertension, EtOH abuse presents to the ED for evaluation of 2 days of nausea, vomiting, diarrhea. Patient states last time he felt like this he was found to be hyponatremic. On physical examination, patient is uncomfortable appearing. He is hypertensive with systolic of 180. Patient states that this is not abnormal for him, however he has not taken his lisinopril for 2 days given his nausea and vomiting. CBC, CMP, EKG, chest x-ray, urinalysis, troponin demonstrating sodium of 119 with chloride of 79. Patient given 1 L normal saline IV bolus prior to CMP results. He reports improvement of his nausea after IV Zofran.     Given profound hyponatremia, patient will be admitted to the hospital for further treatment and evaluation. Patient signed out to admitting physician, Dr. Neida Rothman. MDM           Consultations:       Consultations: -  Hospitalist Consultant: Dr. Neida Rothman: We have asked for emergent assistance with regard to this patient. We have discussed the patients HPI, ROS, PE and results this far. They will come and evaluate the patient for admission. Procedures and Critical Care       Performed by: Kamari Rosario DO  PROCEDURES:  Procedures         CRITICAL CARE NOTE :  2:43 PM  Amount of Critical Care Time: 31(minutes)    IMPENDING DETERIORATION -Metabolic  ASSOCIATED RISK FACTORS - Metabolic changes and Dehydration  MANAGEMENT- Bedside Assessment  INTERPRETATION -  Xrays and ECG  INTERVENTIONS - Metobolic interventions  CASE REVIEW - Hospitalist/Intensivist and Nursing  TREATMENT RESPONSE -Improved  PERFORMED BY - Self    NOTES   :  I have spent critical care time involved in lab review, consultations with specialist, family decision- making, bedside attention and documentation. This time excludes time spent in any separate billed procedures. During this entire length of time I was immediately available to the patient . Kamari Rosario DO        Disposition     Disposition: Admitted to Floor Medical Floor the case was discussed with the admitting physician Leandra RODRIGUEZ  Diagnosis     Clinical Impression:   1. Hyponatremia        Attestations:    Kamari Rosario DO    Please note that this dictation was completed with [employer]oof, the computer voice recognition software. Quite often unanticipated grammatical, syntax, homophones, and other interpretive errors are inadvertently transcribed by the computer software. Please disregard these errors. Please excuse any errors that have escaped final proofreading. Thank you.

## 2022-04-03 NOTE — ED TRIAGE NOTES
Vomiting x 2 days, cant keep anything down, \"dizzy when walking around, 2 months ago\", no blood in vomit, diarrhea, cold sweats, no new foods, hasn't taken anything for symptoms

## 2022-04-04 VITALS
TEMPERATURE: 97.7 F | RESPIRATION RATE: 20 BRPM | HEART RATE: 108 BPM | HEIGHT: 68 IN | WEIGHT: 199.08 LBS | OXYGEN SATURATION: 93 % | SYSTOLIC BLOOD PRESSURE: 195 MMHG | DIASTOLIC BLOOD PRESSURE: 100 MMHG | BODY MASS INDEX: 30.17 KG/M2

## 2022-04-04 LAB
ALBUMIN SERPL-MCNC: 3.2 G/DL (ref 3.5–5)
ANION GAP SERPL CALC-SCNC: 8 MMOL/L (ref 5–15)
ANION GAP SERPL CALC-SCNC: 8 MMOL/L (ref 5–15)
ATRIAL RATE: 81 BPM
BUN SERPL-MCNC: 10 MG/DL (ref 6–20)
BUN SERPL-MCNC: 14 MG/DL (ref 6–20)
BUN/CREAT SERPL: 13 (ref 12–20)
BUN/CREAT SERPL: 16 (ref 12–20)
CA-I BLD-MCNC: 8.5 MG/DL (ref 8.5–10.1)
CA-I BLD-MCNC: 8.6 MG/DL (ref 8.5–10.1)
CALCULATED P AXIS, ECG09: 70 DEGREES
CALCULATED R AXIS, ECG10: 38 DEGREES
CALCULATED T AXIS, ECG11: 39 DEGREES
CHLORIDE SERPL-SCNC: 89 MMOL/L (ref 97–108)
CHLORIDE SERPL-SCNC: 92 MMOL/L (ref 97–108)
CO2 SERPL-SCNC: 27 MMOL/L (ref 21–32)
CO2 SERPL-SCNC: 27 MMOL/L (ref 21–32)
CREAT SERPL-MCNC: 0.78 MG/DL (ref 0.7–1.3)
CREAT SERPL-MCNC: 0.89 MG/DL (ref 0.7–1.3)
DIAGNOSIS, 93000: NORMAL
ERYTHROCYTE [DISTWIDTH] IN BLOOD BY AUTOMATED COUNT: 14.6 % (ref 11.5–14.5)
GLUCOSE SERPL-MCNC: 85 MG/DL (ref 65–100)
GLUCOSE SERPL-MCNC: 93 MG/DL (ref 65–100)
HCT VFR BLD AUTO: 37.9 % (ref 36.6–50.3)
HGB BLD-MCNC: 13.1 G/DL (ref 12.1–17)
MCH RBC QN AUTO: 32.4 PG (ref 26–34)
MCHC RBC AUTO-ENTMCNC: 34.6 G/DL (ref 30–36.5)
MCV RBC AUTO: 93.8 FL (ref 80–99)
NRBC # BLD: 0 K/UL (ref 0–0.01)
NRBC BLD-RTO: 0 PER 100 WBC
P-R INTERVAL, ECG05: 144 MS
PHOSPHATE SERPL-MCNC: 2.8 MG/DL (ref 2.6–4.7)
PLATELET # BLD AUTO: 149 K/UL (ref 150–400)
PMV BLD AUTO: 9.3 FL (ref 8.9–12.9)
POTASSIUM SERPL-SCNC: 4.1 MMOL/L (ref 3.5–5.1)
POTASSIUM SERPL-SCNC: 4.4 MMOL/L (ref 3.5–5.1)
Q-T INTERVAL, ECG07: 438 MS
QRS DURATION, ECG06: 84 MS
QTC CALCULATION (BEZET), ECG08: 508 MS
RBC # BLD AUTO: 4.04 M/UL (ref 4.1–5.7)
SODIUM SERPL-SCNC: 124 MMOL/L (ref 136–145)
SODIUM SERPL-SCNC: 127 MMOL/L (ref 136–145)
TSH SERPL DL<=0.05 MIU/L-ACNC: 1.75 UIU/ML (ref 0.36–3.74)
VENTRICULAR RATE, ECG03: 81 BPM
WBC # BLD AUTO: 9.1 K/UL (ref 4.1–11.1)

## 2022-04-04 PROCEDURE — 74011250636 HC RX REV CODE- 250/636: Performed by: HOSPITALIST

## 2022-04-04 PROCEDURE — 74011250637 HC RX REV CODE- 250/637: Performed by: HOSPITALIST

## 2022-04-04 PROCEDURE — 96361 HYDRATE IV INFUSION ADD-ON: CPT

## 2022-04-04 PROCEDURE — 96375 TX/PRO/DX INJ NEW DRUG ADDON: CPT

## 2022-04-04 PROCEDURE — 84443 ASSAY THYROID STIM HORMONE: CPT

## 2022-04-04 PROCEDURE — 36415 COLL VENOUS BLD VENIPUNCTURE: CPT

## 2022-04-04 PROCEDURE — 80048 BASIC METABOLIC PNL TOTAL CA: CPT

## 2022-04-04 PROCEDURE — G0378 HOSPITAL OBSERVATION PER HR: HCPCS

## 2022-04-04 PROCEDURE — 74011250636 HC RX REV CODE- 250/636: Performed by: INTERNAL MEDICINE

## 2022-04-04 PROCEDURE — 85027 COMPLETE CBC AUTOMATED: CPT

## 2022-04-04 PROCEDURE — 80069 RENAL FUNCTION PANEL: CPT

## 2022-04-04 PROCEDURE — 74011000250 HC RX REV CODE- 250: Performed by: HOSPITALIST

## 2022-04-04 PROCEDURE — 94640 AIRWAY INHALATION TREATMENT: CPT

## 2022-04-04 PROCEDURE — 97165 OT EVAL LOW COMPLEX 30 MIN: CPT

## 2022-04-04 RX ORDER — ASPIRIN 325 MG/1
100 TABLET, FILM COATED ORAL DAILY
Qty: 30 TABLET | Refills: 0 | Status: SHIPPED | OUTPATIENT
Start: 2022-04-05

## 2022-04-04 RX ORDER — SODIUM CHLORIDE 9 MG/ML
125 INJECTION, SOLUTION INTRAVENOUS CONTINUOUS
Status: DISCONTINUED | OUTPATIENT
Start: 2022-04-04 | End: 2022-04-04 | Stop reason: HOSPADM

## 2022-04-04 RX ORDER — HYDRALAZINE HYDROCHLORIDE 20 MG/ML
5 INJECTION INTRAMUSCULAR; INTRAVENOUS
Status: DISCONTINUED | OUTPATIENT
Start: 2022-04-04 | End: 2022-04-04 | Stop reason: HOSPADM

## 2022-04-04 RX ORDER — CARVEDILOL 6.25 MG/1
6.25 TABLET ORAL 2 TIMES DAILY
Qty: 60 TABLET | Refills: 0 | Status: SHIPPED | OUTPATIENT
Start: 2022-04-04

## 2022-04-04 RX ORDER — SODIUM BICARBONATE 650 MG/1
325 TABLET ORAL 2 TIMES DAILY
Qty: 60 TABLET | Refills: 0 | Status: SHIPPED | OUTPATIENT
Start: 2022-04-04

## 2022-04-04 RX ORDER — ACETAMINOPHEN 325 MG/1
650 TABLET ORAL
Qty: 60 TABLET | Refills: 0 | Status: SHIPPED | OUTPATIENT
Start: 2022-04-04

## 2022-04-04 RX ORDER — CHLORDIAZEPOXIDE HYDROCHLORIDE 25 MG/1
25 CAPSULE, GELATIN COATED ORAL 4 TIMES DAILY
Qty: 6 CAPSULE | Refills: 0 | Status: SHIPPED | OUTPATIENT
Start: 2022-04-04 | End: 2022-04-06

## 2022-04-04 RX ADMIN — SODIUM CHLORIDE 125 ML/HR: 9 INJECTION, SOLUTION INTRAVENOUS at 11:00

## 2022-04-04 RX ADMIN — CHLORDIAZEPOXIDE HYDROCHLORIDE 25 MG: 25 CAPSULE ORAL at 13:24

## 2022-04-04 RX ADMIN — PANTOPRAZOLE SODIUM 40 MG: 40 TABLET, DELAYED RELEASE ORAL at 08:43

## 2022-04-04 RX ADMIN — POTASSIUM CHLORIDE 40 MEQ: 750 TABLET, FILM COATED, EXTENDED RELEASE ORAL at 04:16

## 2022-04-04 RX ADMIN — SODIUM CHLORIDE 125 ML/HR: 9 INJECTION, SOLUTION INTRAVENOUS at 03:34

## 2022-04-04 RX ADMIN — CARVEDILOL 6.25 MG: 3.12 TABLET, FILM COATED ORAL at 08:43

## 2022-04-04 RX ADMIN — THIAMINE HCL TAB 100 MG 100 MG: 100 TAB at 08:43

## 2022-04-04 RX ADMIN — ALBUTEROL SULFATE 2 PUFF: 108 INHALANT RESPIRATORY (INHALATION) at 06:24

## 2022-04-04 RX ADMIN — Medication 1 TABLET: at 10:44

## 2022-04-04 RX ADMIN — FLUTICASONE FUROATE, UMECLIDINIUM BROMIDE AND VILANTEROL TRIFENATATE 1 PUFF: 100; 62.5; 25 POWDER RESPIRATORY (INHALATION) at 08:43

## 2022-04-04 RX ADMIN — HYDRALAZINE HYDROCHLORIDE 5 MG: 20 INJECTION, SOLUTION INTRAMUSCULAR; INTRAVENOUS at 10:50

## 2022-04-04 RX ADMIN — CHLORDIAZEPOXIDE HYDROCHLORIDE 25 MG: 25 CAPSULE ORAL at 08:41

## 2022-04-04 RX ADMIN — SODIUM CHLORIDE, PRESERVATIVE FREE 10 ML: 5 INJECTION INTRAVENOUS at 13:24

## 2022-04-04 RX ADMIN — SODIUM CHLORIDE, PRESERVATIVE FREE 10 ML: 5 INJECTION INTRAVENOUS at 06:21

## 2022-04-04 NOTE — DISCHARGE INSTRUCTIONS
Patient Education        Electrolyte Imbalance: Care Instructions  Your Care Instructions  Electrolytes are minerals in your blood. They include sodium, potassium, calcium, and magnesium. When they are not at the right levels, you can feel very ill. You may not know what is causing it, but you know something is wrong. You may feel weak or numb, have muscle spasms, or twitch. Your heart may beat fast. Symptoms are different with each mineral. Too much is as bad as too little. Minerals help keep your body working as it should. Vomiting, diarrhea, and fever can cause you to lose minerals. A problem with your kidneys can tip a mineral out of balance. So can taking certain medicines. Your doctor may do more tests. He or she may change your medicine and diet. If you are low in one or more minerals, they may be given through a tube into your vein (IV). Your doctor may have you take or drink special fluids or pills. If your kidneys are failing, your blood may be filtered. This is called dialysis. It can put the minerals back in balance. Follow-up care is a key part of your treatment and safety. Be sure to make and go to all appointments, and call your doctor if you are having problems. It's also a good idea to know your test results and keep a list of the medicines you take. How can you care for yourself at home? · Take your medicines exactly as prescribed. Call your doctor if you have any problems with your medicines. You will get more details on the specific medicines your doctor prescribes. · Do not take any medicine without talking to your doctor first. This includes prescription, over-the-counter, and herbal medicines. · If you have kidney, heart, or liver disease and have to limit fluids, talk with your doctor before you increase the amount of fluids you drink. · Your doctor or dietitian may give you a diet plan to help balance your minerals. Follow the diet carefully. When should you call for help?    Call 911 anytime you think you may need emergency care. For example, call if:    · You passed out (lost consciousness).     · Your heartbeat seems to be irregular. It might be speeding up and then slowing down or skipping beats. Call your doctor now or seek immediate medical care if:    · You have muscle aches.     · You feel very weak.     · You are confused or cannot think clearly. Watch closely for changes in your health, and be sure to contact your doctor if:    · You do not get better as expected. Where can you learn more? Go to http://www.gray.com/  Enter U603 in the search box to learn more about \"Electrolyte Imbalance: Care Instructions. \"  Current as of: September 8, 2021               Content Version: 13.2  © 2006-2022 [employer]. Care instructions adapted under license by [employer] (which disclaims liability or warranty for this information). If you have questions about a medical condition or this instruction, always ask your healthcare professional. [place] [address] any warranty or liability for your use of this information. Patient Education        Alcohol Detoxification and Withdrawal: Care Instructions  Your Care Instructions     If you drink alcohol regularly and then suddenly stop, you may go through some physical and emotional problems while the alcohol clears out of your system. Clearing the alcohol from your body is called detoxification, or detox. Physical and emotional problems that may happen during detox are called withdrawal.  Symptoms of withdrawal can be scary and dangerous. Mild symptoms include nausea and vomiting, sweating, shakiness, and intense worry. Severe symptoms include being confused and irritable, feeling things on your body that are not there, seeing or hearing things that are not there, and trembling. You may even have seizures. If your symptoms become severe you must see a doctor.  People who drink large amounts of alcohol should not try to detox at home. A person can die of severe alcohol withdrawal.  Symptoms of alcohol withdrawal may begin from 4 to 12 hours after you stop drinking. But they may not start for several days after the last drink. They can last a few days. It is hard to stop drinking. But when you have cleared the alcohol from your system, you will be able to start the next part of your life, free from the burden of being dependent. Follow-up care is a key part of your treatment and safety. Be sure to make and go to all appointments, and call your doctor if you are having problems. It's also a good idea to know your test results and keep a list of the medicines you take. How can you care for yourself at home? · Before you stop drinking, talk to your doctor about how you plan to stop. Be sure to be completely honest with him or her about how much you have been drinking. Your doctor will figure out whether you need to detox in a supervised medical center. · Take your medicines exactly as prescribed. Call your doctor if you think you are having a problem with your medicine. · Make sure someone you trust is with you the whole time. Have friends and family members take turns staying with you until you are done with detox. · Put a list of emergency numbers near the phone. This should include your doctor, the police, the nearest hospital and emergency room, and neighbors who can help if needed. · Make sure all alcohol is removed from the house before you start. This includes beverages as well as medicines, rubbing alcohol, and certain flavorings like vanilla extract. · Keep \"drinking buddies\" away during the time you are going through detox. · Make your surroundings calm. Soft lights, soft music, and a comfortable place to sit or lie down can help make the process easier. · Drink lots of fluids and eat snacks such as fruit, cheese and crackers, and pretzels.  Foods high in carbohydrate may help reduce the craving for alcohol. · Understand that detox is going to be hard. · Keep in mind that the people watching over you during detox are there to help. Explain to them before you start that you may not act like yourself until detox is finished. · Consider joining a support group such as Alcoholics Anonymous. Sharing your experiences with other people who face similar challenges may help you feel less overwhelmed. · Keep the numbers for these national suicide hotlines: 6-028-666-TALK (9-787.906.7343) and 6-194-EQBHWPQ (1-543.611.3274). If you or someone you know talks about suicide or feeling hopeless, get help right away. When should you call for help? Call 911 anytime you think you may need emergency care. For example, call if:    · You feel you cannot stop from hurting yourself or someone else.     · You vomit many times and cannot stop.     · You vomit blood or what looks like coffee grounds.     · You have trouble breathing or are breathing very fast.     · Your heart beats more than 120 times a minute and will not slow down.     · You have chest pain.     · You have a seizure.     · You see or feel things that are not there (hallucinate). If you are caring for someone who is going through detox, call if:    · The person passes out (loses consciousness).     · The person sees or feels things that are not there and sees or hears the same things many times.     · The person is very agitated and will not calm down.     · The person becomes violent or threatens to be violent.     · The person has a seizure. Call your doctor now or seek immediate medical care if:    · You have a high fever.     · You have severe belly pain.     · You are very shaky. Watch closely for changes in your health, and be sure to contact your doctor if:    · You do not get better as expected. Where can you learn more?   Go to http://www.gray.com/  Enter D051 in the search box to learn more about \"Alcohol Detoxification and Withdrawal: Care Instructions. \"  Current as of: November 8, 2021               Content Version: 13.2  © 4728-1871 [employer]. Care instructions adapted under license by [employer] (which disclaims liability or warranty for this information). If you have questions about a medical condition or this instruction, always ask your healthcare professional. [address] any warranty or liability for your use of this information.

## 2022-04-04 NOTE — PROGRESS NOTES
4/4/22. PCP is Dr. Alon Le. - seen 2 mos ago. D/C Plan is home with daughter & daughter to transport pt home upon discharge. Pt uses no DME & has no home health @ this time.

## 2022-04-04 NOTE — DISCHARGE SUMMARY
Hospitalist Discharge Summary     Patient ID:  Salvatore Rahman  [id number]  50 y.o.  1970  4/3/2022    PCP on record: None    Admit date: 4/3/2022  Discharge date and time: 4/4/2022    DISCHARGE DIAGNOSIS:    Hyponatremia/history of EtOH abuse/alcoholic hepatitis/hypokalemia/chronic bronchitis/hypertension    CONSULTATIONS:  IP CONSULT TO NEPHROLOGY    Excerpted HPI from H&P of Martha Rich MD:  50 y.o. male history of hypertension, COPD presents to the emergency room complaining of significant nausea associated vomiting and unable to keep anything down. Denies anything in particular triggered, no fever, chills, cough or trouble breathing. No exacerbating relieving factors. He has some shortness of breath at baseline with activity with no change. States he is not even able to drink his alcohol for 4 days, usually he drinks 12 to 15 cans daily. He has no previous history of withdrawals but but does not remember if he ever stopped drinking any day. Started feeling dizzy and lightheaded with activity, significant fatigue and generalized weakness. Evaluation found with hyponatremia on laboratory data mild hypokalemia with signs of dehydration. Admitted for further management.    ______________________________________________________________________  DISCHARGE SUMMARY/HOSPITAL COURSE:  for full details see H&P, daily progress notes, labs, consult notes.      Patient was subsequently admitted to [facility] further evaluation as well as management, patient remained on continuous telemetry monitoring, patient received IV fluid resuscitation, patient serum sodium improved, patient was started on Librium, patient's withdrawal symptoms improved, patient was eval by nephrology, following which as patient's clinical status improved patient was deemed stable for discharge home with close outpatient follow-up with primary care physician as well as nephrology as well as psychiatry, plan was explained the patient in details agreeable to current plan        _______________________________________________________________________  Patient seen and examined by me on discharge day. Pertinent Findings:  Gen:    Not in distress  Chest: Clear lungs  CVS:   Regular rhythm, s1/s2 no m/r/g  No edema  Abd:  Soft, not distended, not tender  Neuro:  Alert, Oriented x 4  _______________________________________________________________________  DISCHARGE MEDICATIONS:   Current Discharge Medication List      START taking these medications    Details   acetaminophen (TYLENOL) 325 mg tablet Take 2 Tablets by mouth every four (4) hours as needed for Pain or Fever. Qty: 60 Tablet, Refills: 0  Start date: 4/4/2022      b complex-vitamin c-folic acid 5mg (FOLBEE PLUS) tab tablet Take 1 Tablet by mouth daily. Qty: 30 Tablet, Refills: 0  Start date: 4/4/2022      carvediloL (COREG) 6.25 mg tablet Take 1 Tablet by mouth two (2) times a day. Qty: 60 Tablet, Refills: 0  Start date: 4/4/2022      chlordiazePOXIDE (LIBRIUM) 25 mg capsule Take 1 Capsule by mouth four (4) times daily for 6 doses. Max Daily Amount: 100 mg. Qty: 6 Capsule, Refills: 0  Start date: 4/4/2022, End date: 4/6/2022    Associated Diagnoses: Alcohol withdrawal syndrome without complication (HCC)      thiamine mononitrate (B-1) 100 mg tablet Take 1 Tablet by mouth daily. Qty: 30 Tablet, Refills: 0  Start date: 4/5/2022      sodium bicarbonate 650 mg tablet Take 0.5 Tablets by mouth two (2) times a day. Qty: 60 Tablet, Refills: 0  Start date: 4/4/2022         CONTINUE these medications which have NOT CHANGED    Details   fluticasone-umeclidinium-vilanterol (Trelegy Ellipta) 100-62.5-25 mcg inhaler Take 1 Puff by inhalation daily. lisinopriL (PRINIVIL, ZESTRIL) 20 mg tablet Take 20 mg by mouth daily.       albuterol (PROVENTIL HFA, VENTOLIN HFA, PROAIR HFA) 90 mcg/actuation inhaler Take 2 Puffs by inhalation every four (4) hours as needed for Shortness of Breath or Wheezing. Humira,CF, Pen 40 mg/0.4 mL injection pen 40 mg by SubCUTAneous route every fourteen (14) days. venlafaxine-SR (EFFEXOR-XR) 75 mg capsule Take 75 mg by mouth daily. Patient Follow Up Instructions: Activity: Activity as tolerated  Diet: Cardiac Diet  Wound Care: None needed    Follow-up with PCP/Psychiatry/Nephrology in 2 weeks. Follow-up tests/labs As per above physicians  Follow-up Information     Follow up With Specialties Details Why Contact Info    Nayeli Hernandez NP Nurse Practitioner In 1 week  [address]      Flavio Marr MD Psychiatry In 1 week  [address]  Kalvin Mortimer, MD Nephrology In 1 week  [phone]  [phone]      None    None (947) Patient stated that they have no PCP          ________________________________________________________________    Risk of deterioration: Low    Condition at Discharge:  Stable  __________________________________________________________________    Disposition  Home with family, no needs    ____________________________________________________________________    Code Status: Full Code  ___________________________________________________________________      Total time in minutes spent coordinating this discharge (includes going over instructions, follow-up, prescriptions, and preparing report for sign off to her PCP) :  45 minutes    Signed:   Enmanuel Wetzel MD

## 2022-04-04 NOTE — PROGRESS NOTES
Notified Dr. Kennedy Goodman regarding patient blood pressure of 190/86 at 2350. Dr. Melissa Polk to not medicate. Patient had received carvedilol and nitro ointment at 2045.

## 2022-04-04 NOTE — CONSULTS
Renal consult      Subjective   HISTORY OF PRESENTING ILLNESS        82-year-old male with a history of hypertension, ankylosing spondylitis, COPD presented to the emergency room yesterday complaining of significant nausea that was associated with vomiting. Patient states that symptoms started 6 days ago and was not brought along by any trigger. Patient states the symptoms came up abruptly and he was unable to drink his normal 12 to 15 cans of beer. 2 days after alcohol cessation patient states that he began to have worsening nausea and vomiting and states that he began having auditory hallucinations and lightheadedness. Patient states he also began having shakes. Patient states he has no previous histories of withdrawal but cannot remember if is ever stopped drinking. In the ED the patient had a sodium level of 119 with a potassium level of 3.4. Creatinine was 0.78 with a GFR above 60. In the ED the patient was given sodium chloride infusion and since his sodium has been above 120.     On examination today patient states that he is feeling significantly better and states that he no longer has nausea, vomiting, shakes, auditory hallucinations. Patient denied dysuria or polyuria and denies flank pain. Today is 124 and his potassium is 4.4, with a creatinine of 0.78.          Past Medical History:   Diagnosis Date    COPD (chronic obstructive pulmonary disease) ([facility] [address]      Hypertension        History reviewed. No pertinent surgical history. History reviewed. No pertinent family history. Social History            Tobacco Use    Smoking status: Current Every Day Smoker       Packs/day: 1.50    Smokeless tobacco: Never Used   Substance Use Topics    Alcohol use:  Yes       Comment: 12-15 beers a day                 Current Facility-Administered Medications   Medication Dose Route Frequency Provider Last Rate Last Admin    hydrALAZINE (APRESOLINE) 20 mg/mL injection 5 mg  5 mg IntraVENous Q4H PRN Avila Elizondo MD   5 mg at 04/04/22 1050    0.9% sodium chloride infusion  125 mL/hr IntraVENous CONTINUOUS FrankdarVan  mL/hr at 04/04/22 1100 125 mL/hr at 04/04/22 1100    albuterol (PROVENTIL HFA, VENTOLIN HFA, PROAIR HFA) inhaler 2 Puff  2 Puff Inhalation Q4H PRN Sagar Mendenhall MD   2 Puff at 04/04/22 0624    fluticasone-umeclidinium-vilanterol (TRELEGY ELLIPTA) inhaler 1 Puff  1 Puff Inhalation DAILY Sagar Mendenhall MD   1 Puff at 04/04/22 0843    sodium chloride (NS) flush 5-40 mL  5-40 mL IntraVENous Q8H Sagar Mendenhall MD   10 mL at 04/04/22 0829    sodium chloride (NS) flush 5-40 mL  5-40 mL IntraVENous PRN Sagar Mendenhall MD        acetaminophen (TYLENOL) tablet 650 mg  650 mg Oral Q4H PRN Sagar Mendenhall MD        docusate sodium (COLACE) capsule 100 mg  100 mg Oral BID Sagar Mendenhall MD   100 mg at 04/03/22 2001    enoxaparin (LOVENOX) injection 40 mg  40 mg SubCUTAneous Q24H Sagar Mendenhall MD   40 mg at 04/03/22 2001    carvediloL (COREG) tablet 6.25 mg  6.25 mg Oral BID Sagar Mendenhall MD   6.25 mg at 04/04/22 0843    thiamine mononitrate (B-1) tablet 100 mg  100 mg Oral DAILY Sagar Mendenhall MD   100 mg at 04/04/22 0843    b complex-vitamin c-folic acid 5mg (FOLBEE PLUS) tablet 1 Tablet  1 Tablet Oral DAILY Sagar Mendenhall MD   1 Tablet at 04/04/22 1044    nicotine (NICODERM CQ) 21 mg/24 hr patch 1 Patch  1 Patch TransDERmal DAILY Sagar Mendenhall MD   1 Patch at 04/04/22 0845    LORazepam (ATIVAN) injection 1 mg  1 mg IntraVENous Q4H PRN Sagar Mendenhall MD        pantoprazole (PROTONIX) tablet 40 mg  40 mg Oral ACB Sagar Mendenhall MD   40 mg at 04/04/22 0843    chlordiazePOXIDE (LIBRIUM) capsule 25 mg  25 mg Oral QID Sagar Mendenhall MD   25 mg at 04/04/22 5319        Review of Systems   Constitutional: Negative for activity change, appetite change, chills, diaphoresis and fever. HENT: Negative for nosebleeds, rhinorrhea, sneezing, sore throat, tinnitus and trouble swallowing. Respiratory: Negative for apnea, cough, choking, chest tightness, shortness of breath and wheezing. Cardiovascular: Negative for chest pain, palpitations and leg swelling. Gastrointestinal: Negative for abdominal distention, anal bleeding, blood in stool, constipation, diarrhea, nausea and vomiting. Genitourinary: Negative for decreased urine volume, hematuria and urgency. Musculoskeletal: Negative for gait problem, joint swelling and neck pain. Neurological: Negative for dizziness, seizures, syncope, facial asymmetry, speech difficulty and light-headedness.    Psychiatric/Behavioral: Negative for agitation, behavioral problems, confusion, decreased concentration, dysphoric mood and hallucinations.         Objective      Vital signs for last 24 hours:  Visit Vitals  BP (!) 195/100 (BP 1 Location: Right upper arm)   Pulse (!) 108   Temp 97.7 °F (36.5 °C)   Resp 20   Ht 5' 8\" (1.727 m)   Wt 90.3 kg (199 lb 1.2 oz)   SpO2 93%   BMI 30.27 kg/m²               Recent Results          Recent Results (from the past 24 hour(s))   EKG, 12 LEAD, INITIAL     Collection Time: 04/03/22  1:30 PM   Result Value Ref Range     Ventricular Rate 81 BPM     Atrial Rate 81 BPM     P-R Interval 144 ms     QRS Duration 84 ms     Q-T Interval 438 ms     QTC Calculation (Bezet) 508 ms     Calculated P Axis 70 degrees     Calculated R Axis 38 degrees     Calculated T Axis 39 degrees     Diagnosis           Normal sinus rhythm with sinus arrhythmia  ST & T wave abnormality, consider inferior ischemia  Prolonged QT  Abnormal ECG  When compared with ECG of 18-JAN-2022 21:56,  Premature atrial complexes are no longer Present  T wave inversion now evident in Inferior leads  Confirmed by ANTONIO DESAI, Leila Cohen ([id number]) on 4/4/2022 10:58:04 AM      CBC WITH AUTOMATED DIFF     Collection Time: 04/03/22  1:45 PM Result Value Ref Range     WBC 7.5 4.1 - 11.1 K/uL     RBC 4.40 4. 10 - 5.70 M/uL     HGB 14.6 12.1 - 17.0 g/dL     HCT 40.3 36.6 - 50.3 %     MCV 91.6 80.0 - 99.0 FL     MCH 33.2 26.0 - 34.0 PG     MCHC 36.2 30.0 - 36.5 g/dL     RDW 14.1 11.5 - 14.5 %     PLATELET 693 (L) 996 - 400 K/uL     MPV 8.6 (L) 8.9 - 12.9 FL     NEUTROPHILS 86 (H) 32 - 75 %     LYMPHOCYTES 8 (L) 12 - 49 %     MONOCYTES 6 5 - 13 %     EOSINOPHILS 0 0 - 7 %     BASOPHILS 0 0 - 1 %     IMMATURE GRANULOCYTES 0 0.0 - 0.5 %     ABS. NEUTROPHILS 6.4 1.8 - 8.0 K/UL     ABS. LYMPHOCYTES 0.6 (L) 0.8 - 3.5 K/UL     ABS. MONOCYTES 0.5 0.0 - 1.0 K/UL     ABS. EOSINOPHILS 0.0 0.0 - 0.4 K/UL     ABS. BASOPHILS 0.0 0.0 - 0.1 K/UL     ABS. IMM. GRANS. 0.0 0.00 - 0.04 K/UL     DF AUTOMATED     METABOLIC PANEL, COMPREHENSIVE     Collection Time: 04/03/22  1:45 PM   Result Value Ref Range     Sodium 119 (LL) 136 - 145 mmol/L     Potassium 3.4 (L) 3.5 - 5.1 mmol/L     Chloride 79 (L) 97 - 108 mmol/L     CO2 28 21 - 32 mmol/L     Anion gap 12 5 - 15 mmol/L     Glucose 172 (H) 65 - 100 mg/dL     BUN 7 6 - 20 mg/dL     Creatinine 0.78 0.70 - 1.30 mg/dL     BUN/Creatinine ratio 9 (L) 12 - 20       GFR est AA >60 >60 ml/min/1.73m2     GFR est non-AA >60 >60 ml/min/1.73m2     Calcium 9.1 8.5 - 10.1 mg/dL     Bilirubin, total 2.2 (H) 0.2 - 1.0 mg/dL     AST (SGOT) 37 15 - 37 U/L     ALT (SGPT) 28 12 - 78 U/L     Alk.  phosphatase 162 (H) 45 - 117 U/L     Protein, total 9.3 (H) 6.4 - 8.2 g/dL     Albumin 3.8 3.5 - 5.0 g/dL     Globulin 5.5 (H) 2.0 - 4.0 g/dL     A-G Ratio 0.7 (L) 1.1 - 2.2     URINALYSIS W/ RFLX MICROSCOPIC     Collection Time: 04/03/22  1:45 PM   Result Value Ref Range     Color Yellow       Appearance Clear Clear       Specific gravity 1.015 1.003 - 1.030       pH (UA) 8.0 5.0 - 8.0       Protein Trace (A) Negative mg/dL     Glucose 100 (A) Negative mg/dL     Ketone 15 (A) Negative mg/dL     Bilirubin Negative Negative       Blood Negative Negative   Urobilinogen 0.1 (L) 0.2 - 1.0 EU/dL     Nitrites Negative Negative       Leukocyte Esterase Negative Negative     TROPONIN-HIGH SENSITIVITY     Collection Time: 04/03/22  1:45 PM   Result Value Ref Range     Troponin-High Sensitivity 38 0 - 76 ng/L   URINE MICROSCOPIC     Collection Time: 04/03/22  1:45 PM   Result Value Ref Range     WBC 0-4 0 - 4 /hpf     RBC 0-5 0 - 5 /hpf     Epithelial cells Few Few /lpf     Bacteria Negative Negative /hpf     Amorphous Crystals 1+ (A) Negative   RENAL FUNCTION PANEL     Collection Time: 04/03/22  7:39 PM   Result Value Ref Range     Sodium 120 (L) 136 - 145 mmol/L     Potassium 3.4 (L) 3.5 - 5.1 mmol/L     Chloride 81 (L) 97 - 108 mmol/L     CO2 28 21 - 32 mmol/L     Anion gap 11 5 - 15 mmol/L     Glucose 154 (H) 65 - 100 mg/dL     BUN 7 6 - 20 mg/dL     Creatinine 0.90 0.70 - 1.30 mg/dL     BUN/Creatinine ratio 8 (L) 12 - 20       GFR est AA >60 >60 ml/min/1.73m2     GFR est non-AA >60 >60 ml/min/1.73m2     Calcium 8.7 8.5 - 10.1 mg/dL     Phosphorus 3.1 2.6 - 4.7 mg/dL     Albumin 3.3 (L) 3.5 - 5.0 g/dL   URIC ACID     Collection Time: 04/03/22  7:39 PM   Result Value Ref Range     Uric acid 3.4 (L) 3.5 - 7.2 mg/dL   TSH 3RD GENERATION     Collection Time: 04/03/22  7:39 PM   Result Value Ref Range     TSH 0.95 0.36 - 3.74 uIU/mL   CK     Collection Time: 04/03/22  7:39 PM   Result Value Ref Range      39 - 308 U/L   SODIUM, UR, RANDOM     Collection Time: 04/03/22  9:50 PM   Result Value Ref Range     Sodium,urine random 33 mmol/L   CREATININE, UR, RANDOM     Collection Time: 04/03/22  9:50 PM   Result Value Ref Range     Creatinine, urine 117.00 mg/dL   TSH 3RD GENERATION     Collection Time: 04/04/22  6:55 AM   Result Value Ref Range     TSH 1.75 0.36 - 3.74 uIU/mL   CBC W/O DIFF     Collection Time: 04/04/22  6:55 AM   Result Value Ref Range     WBC 9.1 4.1 - 11.1 K/uL     RBC 4.04 (L) 4.10 - 5.70 M/uL     HGB 13.1 12.1 - 17.0 g/dL     HCT 37.9 36.6 - 50.3 %   MCV 93.8 80.0 - 99.0 FL     MCH 32.4 26.0 - 34.0 PG     MCHC 34.6 30.0 - 36.5 g/dL     RDW 14.6 (H) 11.5 - 14.5 %     PLATELET 060 (L) 515 - 400 K/uL     MPV 9.3 8.9 - 12.9 FL     NRBC 0.0 0.0  WBC     ABSOLUTE NRBC 0.00 0.00 - 0.01 K/uL   RENAL FUNCTION PANEL     Collection Time: 04/04/22  6:55 AM   Result Value Ref Range     Sodium 124 (L) 136 - 145 mmol/L     Potassium 4.4 3.5 - 5.1 mmol/L     Chloride 89 (L) 97 - 108 mmol/L     CO2 27 21 - 32 mmol/L     Anion gap 8 5 - 15 mmol/L     Glucose 93 65 - 100 mg/dL     BUN 10 6 - 20 mg/dL     Creatinine 0.78 0.70 - 1.30 mg/dL     BUN/Creatinine ratio 13 12 - 20       GFR est AA >60 >60 ml/min/1.73m2     GFR est non-AA >60 >60 ml/min/1.73m2     Calcium 8.5 8.5 - 10.1 mg/dL     Phosphorus 2.8 2.6 - 4.7 mg/dL     Albumin 3.2 (L) 3.5 - 5.0 g/dL               Intake/Output Summary (Last 24 hours) at 4/4/2022 1208  Last data filed at 4/4/2022 0261      Gross per 24 hour   Intake    Output 550 ml   Net -550 ml      Current Shift: No intake/output data recorded. Last 3 Shifts: 04/02 1901 - 04/04 0700  In: -   Out: 550 [Urine:550]  Physical Exam  Constitutional:       Appearance: He is obese. Cardiovascular:      Rate and Rhythm: Normal rate and regular rhythm. Pulses: Normal pulses. Heart sounds: Normal heart sounds. Pulmonary:      Effort: Pulmonary effort is normal.      Breath sounds: Normal breath sounds. Abdominal:      General: There is distension (Ascites). Palpations: There is no mass. Tenderness: There is no abdominal tenderness. There is no guarding or rebound. Skin:     General: Skin is warm and dry. Capillary Refill: Capillary refill takes less than 2 seconds. Neurological:      General: No focal deficit present. Mental Status: He is alert and oriented to person, place, and time. Mental status is at baseline. Psychiatric:         Mood and Affect: Mood normal.         Thought Content:  Thought content normal. Judgment: Judgment normal.            Data Review:   Recent Results          Recent Results (from the past 24 hour(s))   EKG, 12 LEAD, INITIAL     Collection Time: 04/03/22  1:30 PM   Result Value Ref Range     Ventricular Rate 81 BPM     Atrial Rate 81 BPM     P-R Interval 144 ms     QRS Duration 84 ms     Q-T Interval 438 ms     QTC Calculation (Bezet) 508 ms     Calculated P Axis 70 degrees     Calculated R Axis 38 degrees     Calculated T Axis 39 degrees     Diagnosis           Normal sinus rhythm with sinus arrhythmia  ST & T wave abnormality, consider inferior ischemia  Prolonged QT  Abnormal ECG  When compared with ECG of 18-JAN-2022 21:56,  Premature atrial complexes are no longer Present  T wave inversion now evident in Inferior leads  Confirmed by ANTONIO DESAI, Salvatore Brito ([id number]) on 4/4/2022 10:58:04 AM      CBC WITH AUTOMATED DIFF     Collection Time: 04/03/22  1:45 PM   Result Value Ref Range     WBC 7.5 4.1 - 11.1 K/uL     RBC 4.40 4. 10 - 5.70 M/uL     HGB 14.6 12.1 - 17.0 g/dL     HCT 40.3 36.6 - 50.3 %     MCV 91.6 80.0 - 99.0 FL     MCH 33.2 26.0 - 34.0 PG     MCHC 36.2 30.0 - 36.5 g/dL     RDW 14.1 11.5 - 14.5 %     PLATELET 116 (L) 537 - 400 K/uL     MPV 8.6 (L) 8.9 - 12.9 FL     NEUTROPHILS 86 (H) 32 - 75 %     LYMPHOCYTES 8 (L) 12 - 49 %     MONOCYTES 6 5 - 13 %     EOSINOPHILS 0 0 - 7 %     BASOPHILS 0 0 - 1 %     IMMATURE GRANULOCYTES 0 0.0 - 0.5 %     ABS. NEUTROPHILS 6.4 1.8 - 8.0 K/UL     ABS. LYMPHOCYTES 0.6 (L) 0.8 - 3.5 K/UL     ABS. MONOCYTES 0.5 0.0 - 1.0 K/UL     ABS. EOSINOPHILS 0.0 0.0 - 0.4 K/UL     ABS. BASOPHILS 0.0 0.0 - 0.1 K/UL     ABS. IMM.  GRANS. 0.0 0.00 - 0.04 K/UL     DF AUTOMATED     METABOLIC PANEL, COMPREHENSIVE     Collection Time: 04/03/22  1:45 PM   Result Value Ref Range     Sodium 119 (LL) 136 - 145 mmol/L     Potassium 3.4 (L) 3.5 - 5.1 mmol/L     Chloride 79 (L) 97 - 108 mmol/L     CO2 28 21 - 32 mmol/L     Anion gap 12 5 - 15 mmol/L     Glucose 172 (H) 65 - 100 mg/dL     BUN 7 6 - 20 mg/dL     Creatinine 0.78 0.70 - 1.30 mg/dL     BUN/Creatinine ratio 9 (L) 12 - 20       GFR est AA >60 >60 ml/min/1.73m2     GFR est non-AA >60 >60 ml/min/1.73m2     Calcium 9.1 8.5 - 10.1 mg/dL     Bilirubin, total 2.2 (H) 0.2 - 1.0 mg/dL     AST (SGOT) 37 15 - 37 U/L     ALT (SGPT) 28 12 - 78 U/L     Alk.  phosphatase 162 (H) 45 - 117 U/L     Protein, total 9.3 (H) 6.4 - 8.2 g/dL     Albumin 3.8 3.5 - 5.0 g/dL     Globulin 5.5 (H) 2.0 - 4.0 g/dL     A-G Ratio 0.7 (L) 1.1 - 2.2     URINALYSIS W/ RFLX MICROSCOPIC     Collection Time: 04/03/22  1:45 PM   Result Value Ref Range     Color Yellow       Appearance Clear Clear       Specific gravity 1.015 1.003 - 1.030       pH (UA) 8.0 5.0 - 8.0       Protein Trace (A) Negative mg/dL     Glucose 100 (A) Negative mg/dL     Ketone 15 (A) Negative mg/dL     Bilirubin Negative Negative       Blood Negative Negative       Urobilinogen 0.1 (L) 0.2 - 1.0 EU/dL     Nitrites Negative Negative       Leukocyte Esterase Negative Negative     TROPONIN-HIGH SENSITIVITY     Collection Time: 04/03/22  1:45 PM   Result Value Ref Range     Troponin-High Sensitivity 38 0 - 76 ng/L   URINE MICROSCOPIC     Collection Time: 04/03/22  1:45 PM   Result Value Ref Range     WBC 0-4 0 - 4 /hpf     RBC 0-5 0 - 5 /hpf     Epithelial cells Few Few /lpf     Bacteria Negative Negative /hpf     Amorphous Crystals 1+ (A) Negative   RENAL FUNCTION PANEL     Collection Time: 04/03/22  7:39 PM   Result Value Ref Range     Sodium 120 (L) 136 - 145 mmol/L     Potassium 3.4 (L) 3.5 - 5.1 mmol/L     Chloride 81 (L) 97 - 108 mmol/L     CO2 28 21 - 32 mmol/L     Anion gap 11 5 - 15 mmol/L     Glucose 154 (H) 65 - 100 mg/dL     BUN 7 6 - 20 mg/dL     Creatinine 0.90 0.70 - 1.30 mg/dL     BUN/Creatinine ratio 8 (L) 12 - 20       GFR est AA >60 >60 ml/min/1.73m2     GFR est non-AA >60 >60 ml/min/1.73m2     Calcium 8.7 8.5 - 10.1 mg/dL     Phosphorus 3.1 2.6 - 4.7 mg/dL     Albumin 3.3 (L) 3.5 - 5.0 g/dL   URIC ACID     Collection Time: 04/03/22  7:39 PM   Result Value Ref Range     Uric acid 3.4 (L) 3.5 - 7.2 mg/dL   TSH 3RD GENERATION     Collection Time: 04/03/22  7:39 PM   Result Value Ref Range     TSH 0.95 0.36 - 3.74 uIU/mL   CK     Collection Time: 04/03/22  7:39 PM   Result Value Ref Range      39 - 308 U/L   SODIUM, UR, RANDOM     Collection Time: 04/03/22  9:50 PM   Result Value Ref Range     Sodium,urine random 33 mmol/L   CREATININE, UR, RANDOM     Collection Time: 04/03/22  9:50 PM   Result Value Ref Range     Creatinine, urine 117.00 mg/dL   TSH 3RD GENERATION     Collection Time: 04/04/22  6:55 AM   Result Value Ref Range     TSH 1.75 0.36 - 3.74 uIU/mL   CBC W/O DIFF     Collection Time: 04/04/22  6:55 AM   Result Value Ref Range     WBC 9.1 4.1 - 11.1 K/uL     RBC 4.04 (L) 4.10 - 5.70 M/uL     HGB 13.1 12.1 - 17.0 g/dL     HCT 37.9 36.6 - 50.3 %     MCV 93.8 80.0 - 99.0 FL     MCH 32.4 26.0 - 34.0 PG     MCHC 34.6 30.0 - 36.5 g/dL     RDW 14.6 (H) 11.5 - 14.5 %     PLATELET 080 (L) 352 - 400 K/uL     MPV 9.3 8.9 - 12.9 FL     NRBC 0.0 0.0  WBC     ABSOLUTE NRBC 0.00 0.00 - 0.01 K/uL   RENAL FUNCTION PANEL     Collection Time: 04/04/22  6:55 AM   Result Value Ref Range     Sodium 124 (L) 136 - 145 mmol/L     Potassium 4.4 3.5 - 5.1 mmol/L     Chloride 89 (L) 97 - 108 mmol/L     CO2 27 21 - 32 mmol/L     Anion gap 8 5 - 15 mmol/L     Glucose 93 65 - 100 mg/dL     BUN 10 6 - 20 mg/dL     Creatinine 0.78 0.70 - 1.30 mg/dL     BUN/Creatinine ratio 13 12 - 20       GFR est AA >60 >60 ml/min/1.73m2     GFR est non-AA >60 >60 ml/min/1.73m2     Calcium 8.5 8.5 - 10.1 mg/dL     Phosphorus 2.8 2.6 - 4.7 mg/dL     Albumin 3.2 (L) 3.5 - 5.0 g/dL               XR CHEST PORT   Final Result          Chest X-ray 4/4:  Chest single view.     Comparison single view chest January 18, 2022.     Similar coarse reticular markings central and basilar lungs.  No gross  interstitial or alveolar pulmonary edema. Cardiac and mediastinal structures  unchanged. Mild blunting left costophrenic angle may be related to small left pleural  effusion and/or pleural thickening/scar. No pneumothorax. Partially imaged cervical hardware.          Patient Active Problem List   Diagnosis Code    Hyponatremia E87.1         DIAGNOSES:  1. Hyponatremia, moderate, chronic, euvolemic-no neurological symptoms  2. Hypokalemia, mildresolved  3. Alcohol misuse  4. History of ankylosing spondylitis; cervical spine fusion procedure done  5. History of long-term steroid dependency and avascular necrosis  6. Hypertension urgency, related to alcohol withdrawal symptoms  7. At risk of alcohol withdrawal  DISCUSSION:  ·  Hyponatremia likely due to combination of increased intake of hypotonic solutionsbeer potomania and history of? Liver disease(not fully established yet) . UA specific gravity is 1.015 which argues against polydipsia. Urine sodium is 33, which is as expected. · Urine osmolality is not available. Uric acid was within normal limits. TSH was normal  · Sodium increased 119 noted at 1345 on [id number] at 6:55 AM today. A rise of 5 units which is acceptable    PLAN:  · Patient receiving NaCl 0.9% IV fluids when seen at 250 mL/min. Can continue and finish bag of IV fluids and discontinue fluids after. · Counseled on alcohol use and cessation. Patient states that he has already looked into options for quitting. · At risk of alcohol withdrawal  · Decreasing length Will need obstructive sleep apnea evaluation  · Hypokalemia- resolved  · Potassium replaced. Will continue to monitor               Thanks for consulting me.  Please don't hesitate to contact me if any questions arise of if I can assist in any manner.     This dictation was done by dragon, computer voice recognition software.  Often unanticipated grammatical, syntax, phones and other interpretive errors are inadvertently transcribed.  Please excuse errors that have escaped final proofreading. Please contact me if you suspect dictation or transcription errors.   Dr Haider Ramos  [address], [address] [place], [address] [facility]  Cell Phone: [phone]  Office phone: [phone]  Fax: [phone]

## 2022-04-04 NOTE — PROGRESS NOTES
Went over discharge instructions with patient. All questions answered. Follow-up appointments made, prescriptions e-prescribed. IV line removed, tele box removed and returned. Patient escorted downstairs with all belongings via wheelchair, taken home by daughter via car.

## 2022-04-04 NOTE — PROGRESS NOTES
Reason for Admission:  Hyponatremia                     RUR Score:   10%                  Plan for utilizing home health: None @ this time/uses no DME. PCP: First and Last name:  Grisel Srinivasan     Name of Practice:    Are you a current patient: Yes/No: Yes   Approximate date of last visit: 2 Mos ago. Can you participate in a virtual visit with your PCP: Yes/Call                    Current Advanced Directive/Advance Care Plan: Full Code      Healthcare Decision Maker:              Primary Decision Maker: Yamil Perez - [facility] - [phone]                  Transition of Care Plan:  D/C Plan is home with daughter & daughter to transport home upon discharge.

## 2022-04-04 NOTE — PROGRESS NOTES
OCCUPATIONAL THERAPY EVALUATION/DISCHARGE  Patient: Apryl Stiles III (50 y.o. male)  Date: 4/4/2022  Primary Diagnosis: Hyponatremia [E87.1]       Precautions: fall risk       ASSESSMENT  Pt is a 49 y/o M with PMH of HTN and COPD presenting to [facility] with c/o nausea and vomitting, admitted 4/3  and being treated for hyopnatremia and dehydration. Pt received in chair upon arrivall, AXO x4, and agreeable to OT evaluation at this time. Per pt report, pt lives with daughter in a one-story home with 3 GELY and B HR, was mostly IND with ADLs (uses sock aid and shoe horn at baseline) and mobility. He reports no other DME. Based on current observations, pt presents at functional baseline for ADLs/mobility demonstrating good UE strength/AROM, bed mobility, static/dynamic sitting balance, static/dynamic standing balance, and functional activity tolerance impacting overall performance of ADLs and functional transfers/mobility. Pt reports he has been moving within the room [facility]. Pt without non slid socks on upon arrival; OT educated pt on donning socks prior to ambulation and pt reports he was unaware and verbalized understanding (RN informed and was unaware that pt did not have non slid socks on). Pt currently dependent for donning B socks (pt attempted L sock, however, unable to reach toes d/t previous spinal fusions; he reports using sock aid at baseline). Pt req'd SUP for all functional mobility including chair>bathroom, bathroom commode transfer, bathroom mobility, and bathroom>chair; no LOB or SOB noted. Pt req'd SBA to [facility]/don hospital gown while standing at chair d/t lines. Pt reports he is moving better now than he did at home. Overall, pt demo'd good functional activity tolerance and safety within room. Pt is observed and reported to be at functional baseline w/ ADLs/mobility w/ no further OT skills indicated. Pt in agreement.  Will therefore d/c patient from OT caseload at this time recommend d/c home w/ family/self care once medically appropriate. Other factors to consider for discharge: IND baseline      Patient will benefit from skilled therapy intervention to address the above noted impairments. PLAN :  Recommendation for discharge: (in order for the patient to meet his/her long term goals)  [address]    This discharge recommendation:  Has been made in collaboration with the attending provider and/or case management    IF patient discharges home will need the following DME: none       SUBJECTIVE:   Patient stated I am moving better now than at home.     OBJECTIVE DATA SUMMARY:   HISTORY:   Past Medical History:   Diagnosis Date    COPD (chronic obstructive pulmonary disease) ([facility] [address]     Hypertension    History reviewed. No pertinent surgical history. Expanded or extensive additional review of patient history:     Home Situation  Home Environment: Private residence  # Steps to Enter: 3  Rails to Enter: Yes  Hand Rails : Bilateral  One/Two Story Residence: One story  Living Alone: No  Support Systems: Child(zahira)  Patient Expects to be Discharged to[de-identified] Home (Daughter lives with him. )  Current DME Used/Available at Home: None      EXAMINATION OF PERFORMANCE DEFICITS:  Cognitive/Behavioral Status:  Neurologic State: Alert  Orientation Level: Oriented X4                  Hearing: Auditory  Auditory Impairment: None      Range of Motion:  AROM: Within functional limits                         Strength:  Strength: Within functional limits                Coordination:     Fine Motor Skills-Upper: Left Intact; Right Intact    Gross Motor Skills-Upper: Left Intact; Right Intact      Balance:  Sitting: Intact; Without support  Standing: Intact; Without support    Functional Mobility and Transfers for ADLs:  Bed Mobility:       Transfers:  Sit to Stand: Supervision  Stand to Sit: Supervision  Bathroom Mobility: Supervision/set up  Toilet Transfer : Supervision      ADL Intervention and task modifications: Grooming  Brushing Teeth: Independent              Upper [address]: Stand-by assistance (d/t lines )    Lower Body Dressing Assistance  Socks: Total assistance (dependent) (utilizes sock aid at baseline )                Functional Measure:    [address] [place] [address] AM-PACTM \"6 Clicks\"                                                       Daily Activity Inpatient Short Form  How much help from another person does the patient currently need. .. Total; A Lot A Little None   1. Putting on and taking off regular lower body clothing? [x]  1 []  2 []  3 []  4   2. Bathing (including washing, rinsing, drying)? []  1 []  2 [x]  3 []  4   3. Toileting, which includes using toilet, bedpan or urinal? [] 1 []  2 [x]  3 []  4   4. Putting on and taking off regular upper body clothing? []  1 []  2 [x]  3 []  4   5. Taking care of personal grooming such as brushing teeth? []  1 []  2 []  3 [x]  4   6. Eating meals? []  1 []  2 []  3 [x]  4   © 2007, Trustees of [address] [place] [address], under license to [employer]. All rights reserved     Score: 18/24     Interpretation of Tool:  Represents clinically-significant functional categories (i.e. Activities of daily living).   Percentage of Impairment CH    0%   CI    1-19% CJ    20-39% CK    40-59% CL    60-79% CM    80-99% CN     100%   [facility]  Score 6-24 24 23 20-22 15-19 10-14 7-9 6         Occupational Therapy Evaluation Charge Determination   History Examination Decision-Making   LOW Complexity : Brief history review  LOW Complexity : 1-3 performance deficits relating to physical, cognitive , or psychosocial skils that result in activity limitations and / or participation restrictions  LOW Complexity : No comorbidities that affect functional and no verbal or physical assistance needed to complete eval tasks       Based on the above components, the patient evaluation is determined to be of the following complexity level: LOW     Pain Rating:  Pt reports no pain during session. Activity Tolerance: WNL    After treatment patient left in no apparent distress:    Sitting in chair, Call bell within reach and tray next to her    COMMUNICATION/EDUCATION:   The patients plan of care was discussed with: Registered nurse. OT/PT sessions occurred together for increased patient and clinician safety as pt with decreased activity tolerance and requires A of 2 for mobility at this time.      Thank you for this referral.  Kathrine Coleman, OT  Time Calculation: 15 mins

## 2022-04-04 NOTE — MED STUDENT NOTES
Consult Date: 4/4/2022    Consults    Subjective   HISTORY OF PRESENTING ILLNESS   Past Medical History:   Diagnosis Date    COPD (chronic obstructive pulmonary disease) ([facility] [address]     Hypertension       History reviewed. No pertinent surgical history. History reviewed. No pertinent family history.    Social History     Tobacco Use    Smoking status: Current Every Day Smoker     Packs/day: 1.50    Smokeless tobacco: Never Used   Substance Use Topics    Alcohol use: Yes     Comment: 12-15 beers a day       Current Facility-Administered Medications   Medication Dose Route Frequency Provider Last Rate Last Admin    hydrALAZINE (APRESOLINE) 20 mg/mL injection 5 mg  5 mg IntraVENous Q4H PRN Van Mobley MD   5 mg at 04/04/22 1050    0.9% sodium chloride infusion  125 mL/hr IntraVENous CONTINUOUS Gucci Mobley  mL/hr at 04/04/22 1100 125 mL/hr at 04/04/22 1100    albuterol (PROVENTIL HFA, VENTOLIN HFA, PROAIR HFA) inhaler 2 Puff  2 Puff Inhalation Q4H PRN Yue Zhang MD   2 Puff at 04/04/22 0624    fluticasone-umeclidinium-vilanterol (TRELEGY ELLIPTA) inhaler 1 Puff  1 Puff Inhalation DAILY Yue Zhang MD   1 Puff at 04/04/22 0843    sodium chloride (NS) flush 5-40 mL  5-40 mL IntraVENous Q8H Yue Zhang MD   10 mL at 04/04/22 8718    sodium chloride (NS) flush 5-40 mL  5-40 mL IntraVENous PRN Yue Zhang MD        acetaminophen (TYLENOL) tablet 650 mg  650 mg Oral Q4H PRN Yue Zhang MD        docusate sodium (COLACE) capsule 100 mg  100 mg Oral BID Yue Zhang MD   100 mg at 04/03/22 2001    enoxaparin (LOVENOX) injection 40 mg  40 mg SubCUTAneous Q24H Yue Zhang MD   40 mg at 04/03/22 2001    carvediloL (COREG) tablet 6.25 mg  6.25 mg Oral BID Yue Zhang MD   6.25 mg at 04/04/22 0843    thiamine mononitrate (B-1) tablet 100 mg  100 mg Oral DAILY Yue Zhang MD   100 mg at 04/04/22 0843    b complex-vitamin c-folic acid 5mg (FOLBEE PLUS) tablet 1 Tablet  1 Tablet Oral DAILY Aman Brooks MD   1 Tablet at 04/04/22 1044    nicotine (NICODERM CQ) 21 mg/24 hr patch 1 Patch  1 Patch TransDERmal DAILY Aman Brooks MD   1 Patch at 04/04/22 0845    LORazepam (ATIVAN) injection 1 mg  1 mg IntraVENous Q4H PRN Aman Brooks MD        pantoprazole (PROTONIX) tablet 40 mg  40 mg Oral ACB Aman Brooks MD   40 mg at 04/04/22 9292    chlordiazePOXIDE (LIBRIUM) capsule 25 mg  25 mg Oral QID Aman Brooks MD   25 mg at 04/04/22 80      61-year-old male with a history of hypertension, ankylosing spondylitis, COPD presented to the emergency room yesterday complaining of significant nausea that was associated with vomiting. Patient states that symptoms started 6 days ago and was not brought along by any trigger. Patient states the symptoms came up abruptly and he was unable to drink his normal 12 to 15 cans of beer. 2 days after alcohol cessation patient states that he began to have worsening nausea and vomiting and states that he began having auditory hallucinations and lightheadedness. Patient states he also began having shakes. Patient states he has no previous histories of withdrawal but cannot remember if is ever stopped drinking. In the ED the patient had a sodium level of 119 with a potassium level of 3.4. Creatinine was 0.78 with a GFR above 60. In the ED the patient was given sodium chloride infusion and since his sodium has been above 120. On examination today patient states that he is feeling significantly better and states that he no longer has nausea, vomiting, shakes, auditory hallucinations. Patient denied dysuria or polyuria and denies flank pain. Today is 124 and his potassium is 4.4, with a creatinine of 0.78.       Review of Systems   Constitutional: Negative for activity change, appetite change, chills, diaphoresis and fever.   HENT: Negative for nosebleeds, rhinorrhea, sneezing, sore throat, tinnitus and trouble swallowing. Respiratory: Negative for apnea, cough, choking, chest tightness, shortness of breath and wheezing. Cardiovascular: Negative for chest pain, palpitations and leg swelling. Gastrointestinal: Negative for abdominal distention, anal bleeding, blood in stool, constipation, diarrhea, nausea and vomiting. Genitourinary: Negative for decreased urine volume, hematuria and urgency. Musculoskeletal: Negative for gait problem, joint swelling and neck pain. Neurological: Negative for dizziness, seizures, syncope, facial asymmetry, speech difficulty and light-headedness. Psychiatric/Behavioral: Negative for agitation, behavioral problems, confusion, decreased concentration, dysphoric mood and hallucinations. Objective     Vital signs for last 24 hours:  Visit Vitals  BP (!) 195/100 (BP 1 Location: Right upper arm)   Pulse (!) 108   Temp 97.7 °F (36.5 °C)   Resp 20   Ht 5' 8\" (1.727 m)   Wt 90.3 kg (199 lb 1.2 oz)   SpO2 93%   BMI 30.27 kg/m²           Recent Results (from the past 24 hour(s))   EKG, 12 LEAD, INITIAL    Collection Time: 04/03/22  1:30 PM   Result Value Ref Range    Ventricular Rate 81 BPM    Atrial Rate 81 BPM    P-R Interval 144 ms    QRS Duration 84 ms    Q-T Interval 438 ms    QTC Calculation (Bezet) 508 ms    Calculated P Axis 70 degrees    Calculated R Axis 38 degrees    Calculated T Axis 39 degrees    Diagnosis       Normal sinus rhythm with sinus arrhythmia  ST & T wave abnormality, consider inferior ischemia  Prolonged QT  Abnormal ECG  When compared with ECG of 18-JAN-2022 21:56,  Premature atrial complexes are no longer Present  T wave inversion now evident in Inferior leads  Confirmed by ANTONIO DESAI, [facility] ([id number]) on 4/4/2022 10:58:04 AM     CBC WITH AUTOMATED DIFF    Collection Time: 04/03/22  1:45 PM   Result Value Ref Range    WBC 7.5 4.1 - 11.1 K/uL    RBC 4.40 4. 10 - 5.70 M/uL    HGB 14.6 12.1 - 17.0 g/dL    HCT 40.3 36.6 - 50.3 %    MCV 91.6 80.0 - 99.0 FL    MCH 33.2 26.0 - 34.0 PG    MCHC 36.2 30.0 - 36.5 g/dL    RDW 14.1 11.5 - 14.5 %    PLATELET 909 (L) 661 - 400 K/uL    MPV 8.6 (L) 8.9 - 12.9 FL    NEUTROPHILS 86 (H) 32 - 75 %    LYMPHOCYTES 8 (L) 12 - 49 %    MONOCYTES 6 5 - 13 %    EOSINOPHILS 0 0 - 7 %    BASOPHILS 0 0 - 1 %    IMMATURE GRANULOCYTES 0 0.0 - 0.5 %    ABS. NEUTROPHILS 6.4 1.8 - 8.0 K/UL    ABS. LYMPHOCYTES 0.6 (L) 0.8 - 3.5 K/UL    ABS. MONOCYTES 0.5 0.0 - 1.0 K/UL    ABS. EOSINOPHILS 0.0 0.0 - 0.4 K/UL    ABS. BASOPHILS 0.0 0.0 - 0.1 K/UL    ABS. IMM. GRANS. 0.0 0.00 - 0.04 K/UL    DF AUTOMATED     METABOLIC PANEL, COMPREHENSIVE    Collection Time: 04/03/22  1:45 PM   Result Value Ref Range    Sodium 119 (LL) 136 - 145 mmol/L    Potassium 3.4 (L) 3.5 - 5.1 mmol/L    Chloride 79 (L) 97 - 108 mmol/L    CO2 28 21 - 32 mmol/L    Anion gap 12 5 - 15 mmol/L    Glucose 172 (H) 65 - 100 mg/dL    BUN 7 6 - 20 mg/dL    Creatinine 0.78 0.70 - 1.30 mg/dL    BUN/Creatinine ratio 9 (L) 12 - 20      GFR est AA >60 >60 ml/min/1.73m2    GFR est non-AA >60 >60 ml/min/1.73m2    Calcium 9.1 8.5 - 10.1 mg/dL    Bilirubin, total 2.2 (H) 0.2 - 1.0 mg/dL    AST (SGOT) 37 15 - 37 U/L    ALT (SGPT) 28 12 - 78 U/L    Alk.  phosphatase 162 (H) 45 - 117 U/L    Protein, total 9.3 (H) 6.4 - 8.2 g/dL    Albumin 3.8 3.5 - 5.0 g/dL    Globulin 5.5 (H) 2.0 - 4.0 g/dL    A-G Ratio 0.7 (L) 1.1 - 2.2     URINALYSIS W/ RFLX MICROSCOPIC    Collection Time: 04/03/22  1:45 PM   Result Value Ref Range    Color Yellow      Appearance Clear Clear      Specific gravity 1.015 1.003 - 1.030      pH (UA) 8.0 5.0 - 8.0      Protein Trace (A) Negative mg/dL    Glucose 100 (A) Negative mg/dL    Ketone 15 (A) Negative mg/dL    Bilirubin Negative Negative      Blood Negative Negative      Urobilinogen 0.1 (L) 0.2 - 1.0 EU/dL    Nitrites Negative Negative      Leukocyte Esterase Negative Negative     TROPONIN-HIGH SENSITIVITY    Collection Time: 04/03/22  1:45 PM   Result Value Ref Range    Troponin-High Sensitivity 38 0 - 76 ng/L   URINE MICROSCOPIC    Collection Time: 04/03/22  1:45 PM   Result Value Ref Range    WBC 0-4 0 - 4 /hpf    RBC 0-5 0 - 5 /hpf    Epithelial cells Few Few /lpf    Bacteria Negative Negative /hpf    Amorphous Crystals 1+ (A) Negative   RENAL FUNCTION PANEL    Collection Time: 04/03/22  7:39 PM   Result Value Ref Range    Sodium 120 (L) 136 - 145 mmol/L    Potassium 3.4 (L) 3.5 - 5.1 mmol/L    Chloride 81 (L) 97 - 108 mmol/L    CO2 28 21 - 32 mmol/L    Anion gap 11 5 - 15 mmol/L    Glucose 154 (H) 65 - 100 mg/dL    BUN 7 6 - 20 mg/dL    Creatinine 0.90 0.70 - 1.30 mg/dL    BUN/Creatinine ratio 8 (L) 12 - 20      GFR est AA >60 >60 ml/min/1.73m2    GFR est non-AA >60 >60 ml/min/1.73m2    Calcium 8.7 8.5 - 10.1 mg/dL    Phosphorus 3.1 2.6 - 4.7 mg/dL    Albumin 3.3 (L) 3.5 - 5.0 g/dL   URIC ACID    Collection Time: 04/03/22  7:39 PM   Result Value Ref Range    Uric acid 3.4 (L) 3.5 - 7.2 mg/dL   TSH 3RD GENERATION    Collection Time: 04/03/22  7:39 PM   Result Value Ref Range    TSH 0.95 0.36 - 3.74 uIU/mL   CK    Collection Time: 04/03/22  7:39 PM   Result Value Ref Range     39 - 308 U/L   SODIUM, UR, RANDOM    Collection Time: 04/03/22  9:50 PM   Result Value Ref Range    Sodium,urine random 33 mmol/L   CREATININE, UR, RANDOM    Collection Time: 04/03/22  9:50 PM   Result Value Ref Range    Creatinine, urine 117.00 mg/dL   TSH 3RD GENERATION    Collection Time: 04/04/22  6:55 AM   Result Value Ref Range    TSH 1.75 0.36 - 3.74 uIU/mL   CBC W/O DIFF    Collection Time: 04/04/22  6:55 AM   Result Value Ref Range    WBC 9.1 4.1 - 11.1 K/uL    RBC 4.04 (L) 4.10 - 5.70 M/uL    HGB 13.1 12.1 - 17.0 g/dL    HCT 37.9 36.6 - 50.3 %    MCV 93.8 80.0 - 99.0 FL    MCH 32.4 26.0 - 34.0 PG    MCHC 34.6 30.0 - 36.5 g/dL    RDW 14.6 (H) 11.5 - 14.5 %    PLATELET 318 (L) 627 - 400 K/uL    MPV 9.3 8.9 - 12.9 FL NRBC 0.0 0.0  WBC    ABSOLUTE NRBC 0.00 0.00 - 0.01 K/uL   RENAL FUNCTION PANEL    Collection Time: 04/04/22  6:55 AM   Result Value Ref Range    Sodium 124 (L) 136 - 145 mmol/L    Potassium 4.4 3.5 - 5.1 mmol/L    Chloride 89 (L) 97 - 108 mmol/L    CO2 27 21 - 32 mmol/L    Anion gap 8 5 - 15 mmol/L    Glucose 93 65 - 100 mg/dL    BUN 10 6 - 20 mg/dL    Creatinine 0.78 0.70 - 1.30 mg/dL    BUN/Creatinine ratio 13 12 - 20      GFR est AA >60 >60 ml/min/1.73m2    GFR est non-AA >60 >60 ml/min/1.73m2    Calcium 8.5 8.5 - 10.1 mg/dL    Phosphorus 2.8 2.6 - 4.7 mg/dL    Albumin 3.2 (L) 3.5 - 5.0 g/dL          Intake/Output Summary (Last 24 hours) at 4/4/2022 1208  Last data filed at 4/4/2022 1464  Gross per 24 hour   Intake    Output 550 ml   Net -550 ml      Current Shift: No intake/output data recorded. Last 3 Shifts: 04/02 1901 - 04/04 0700  In: -   Out: 550 [Urine:550]  Physical Exam  Constitutional:       Appearance: He is obese. Cardiovascular:      Rate and Rhythm: Normal rate and regular rhythm. Pulses: Normal pulses. Heart sounds: Normal heart sounds. Pulmonary:      Effort: Pulmonary effort is normal.      Breath sounds: Normal breath sounds. Abdominal:      General: There is distension (Ascites). Palpations: There is no mass. Tenderness: There is no abdominal tenderness. There is no guarding or rebound. Skin:     General: Skin is warm and dry. Capillary Refill: Capillary refill takes less than 2 seconds. Neurological:      General: No focal deficit present. Mental Status: He is alert and oriented to person, place, and time. Mental status is at baseline. Psychiatric:         Mood and Affect: Mood normal.         Thought Content:  Thought content normal.         Judgment: Judgment normal.          Data Review:   Recent Results (from the past 24 hour(s))   EKG, 12 LEAD, INITIAL    Collection Time: 04/03/22  1:30 PM   Result Value Ref Range    Ventricular Rate 81 BPM    Atrial Rate 81 BPM    P-R Interval 144 ms    QRS Duration 84 ms    Q-T Interval 438 ms    QTC Calculation (Bezet) 508 ms    Calculated P Axis 70 degrees    Calculated R Axis 38 degrees    Calculated T Axis 39 degrees    Diagnosis       Normal sinus rhythm with sinus arrhythmia  ST & T wave abnormality, consider inferior ischemia  Prolonged QT  Abnormal ECG  When compared with ECG of 18-JAN-2022 21:56,  Premature atrial complexes are no longer Present  T wave inversion now evident in Inferior leads  Confirmed by ANTONIO DESAI, [facility] ([id number]) on 4/4/2022 10:58:04 AM     CBC WITH AUTOMATED DIFF    Collection Time: 04/03/22  1:45 PM   Result Value Ref Range    WBC 7.5 4.1 - 11.1 K/uL    RBC 4.40 4. 10 - 5.70 M/uL    HGB 14.6 12.1 - 17.0 g/dL    HCT 40.3 36.6 - 50.3 %    MCV 91.6 80.0 - 99.0 FL    MCH 33.2 26.0 - 34.0 PG    MCHC 36.2 30.0 - 36.5 g/dL    RDW 14.1 11.5 - 14.5 %    PLATELET 939 (L) 285 - 400 K/uL    MPV 8.6 (L) 8.9 - 12.9 FL    NEUTROPHILS 86 (H) 32 - 75 %    LYMPHOCYTES 8 (L) 12 - 49 %    MONOCYTES 6 5 - 13 %    EOSINOPHILS 0 0 - 7 %    BASOPHILS 0 0 - 1 %    IMMATURE GRANULOCYTES 0 0.0 - 0.5 %    ABS. NEUTROPHILS 6.4 1.8 - 8.0 K/UL    ABS. LYMPHOCYTES 0.6 (L) 0.8 - 3.5 K/UL    ABS. MONOCYTES 0.5 0.0 - 1.0 K/UL    ABS. EOSINOPHILS 0.0 0.0 - 0.4 K/UL    ABS. BASOPHILS 0.0 0.0 - 0.1 K/UL    ABS. IMM.  GRANS. 0.0 0.00 - 0.04 K/UL    DF AUTOMATED     METABOLIC PANEL, COMPREHENSIVE    Collection Time: 04/03/22  1:45 PM   Result Value Ref Range    Sodium 119 (LL) 136 - 145 mmol/L    Potassium 3.4 (L) 3.5 - 5.1 mmol/L    Chloride 79 (L) 97 - 108 mmol/L    CO2 28 21 - 32 mmol/L    Anion gap 12 5 - 15 mmol/L    Glucose 172 (H) 65 - 100 mg/dL    BUN 7 6 - 20 mg/dL    Creatinine 0.78 0.70 - 1.30 mg/dL    BUN/Creatinine ratio 9 (L) 12 - 20      GFR est AA >60 >60 ml/min/1.73m2    GFR est non-AA >60 >60 ml/min/1.73m2    Calcium 9.1 8.5 - 10.1 mg/dL    Bilirubin, total 2.2 (H) 0.2 - 1.0 mg/dL    AST (SGOT) 37 15 - 37 U/L    ALT (SGPT) 28 12 - 78 U/L    Alk.  phosphatase 162 (H) 45 - 117 U/L    Protein, total 9.3 (H) 6.4 - 8.2 g/dL    Albumin 3.8 3.5 - 5.0 g/dL    Globulin 5.5 (H) 2.0 - 4.0 g/dL    A-G Ratio 0.7 (L) 1.1 - 2.2     URINALYSIS W/ RFLX MICROSCOPIC    Collection Time: 04/03/22  1:45 PM   Result Value Ref Range    Color Yellow      Appearance Clear Clear      Specific gravity 1.015 1.003 - 1.030      pH (UA) 8.0 5.0 - 8.0      Protein Trace (A) Negative mg/dL    Glucose 100 (A) Negative mg/dL    Ketone 15 (A) Negative mg/dL    Bilirubin Negative Negative      Blood Negative Negative      Urobilinogen 0.1 (L) 0.2 - 1.0 EU/dL    Nitrites Negative Negative      Leukocyte Esterase Negative Negative     TROPONIN-HIGH SENSITIVITY    Collection Time: 04/03/22  1:45 PM   Result Value Ref Range    Troponin-High Sensitivity 38 0 - 76 ng/L   URINE MICROSCOPIC    Collection Time: 04/03/22  1:45 PM   Result Value Ref Range    WBC 0-4 0 - 4 /hpf    RBC 0-5 0 - 5 /hpf    Epithelial cells Few Few /lpf    Bacteria Negative Negative /hpf    Amorphous Crystals 1+ (A) Negative   RENAL FUNCTION PANEL    Collection Time: 04/03/22  7:39 PM   Result Value Ref Range    Sodium 120 (L) 136 - 145 mmol/L    Potassium 3.4 (L) 3.5 - 5.1 mmol/L    Chloride 81 (L) 97 - 108 mmol/L    CO2 28 21 - 32 mmol/L    Anion gap 11 5 - 15 mmol/L    Glucose 154 (H) 65 - 100 mg/dL    BUN 7 6 - 20 mg/dL    Creatinine 0.90 0.70 - 1.30 mg/dL    BUN/Creatinine ratio 8 (L) 12 - 20      GFR est AA >60 >60 ml/min/1.73m2    GFR est non-AA >60 >60 ml/min/1.73m2    Calcium 8.7 8.5 - 10.1 mg/dL    Phosphorus 3.1 2.6 - 4.7 mg/dL    Albumin 3.3 (L) 3.5 - 5.0 g/dL   URIC ACID    Collection Time: 04/03/22  7:39 PM   Result Value Ref Range    Uric acid 3.4 (L) 3.5 - 7.2 mg/dL   TSH 3RD GENERATION    Collection Time: 04/03/22  7:39 PM   Result Value Ref Range    TSH 0.95 0.36 - 3.74 uIU/mL   CK    Collection Time: 04/03/22  7:39 PM   Result Value Ref Range     39 - 308 U/L SODIUM, UR, RANDOM    Collection Time: 04/03/22  9:50 PM   Result Value Ref Range    Sodium,urine random 33 mmol/L   CREATININE, UR, RANDOM    Collection Time: 04/03/22  9:50 PM   Result Value Ref Range    Creatinine, urine 117.00 mg/dL   TSH 3RD GENERATION    Collection Time: 04/04/22  6:55 AM   Result Value Ref Range    TSH 1.75 0.36 - 3.74 uIU/mL   CBC W/O DIFF    Collection Time: 04/04/22  6:55 AM   Result Value Ref Range    WBC 9.1 4.1 - 11.1 K/uL    RBC 4.04 (L) 4.10 - 5.70 M/uL    HGB 13.1 12.1 - 17.0 g/dL    HCT 37.9 36.6 - 50.3 %    MCV 93.8 80.0 - 99.0 FL    MCH 32.4 26.0 - 34.0 PG    MCHC 34.6 30.0 - 36.5 g/dL    RDW 14.6 (H) 11.5 - 14.5 %    PLATELET 335 (L) 097 - 400 K/uL    MPV 9.3 8.9 - 12.9 FL    NRBC 0.0 0.0  WBC    ABSOLUTE NRBC 0.00 0.00 - 0.01 K/uL   RENAL FUNCTION PANEL    Collection Time: 04/04/22  6:55 AM   Result Value Ref Range    Sodium 124 (L) 136 - 145 mmol/L    Potassium 4.4 3.5 - 5.1 mmol/L    Chloride 89 (L) 97 - 108 mmol/L    CO2 27 21 - 32 mmol/L    Anion gap 8 5 - 15 mmol/L    Glucose 93 65 - 100 mg/dL    BUN 10 6 - 20 mg/dL    Creatinine 0.78 0.70 - 1.30 mg/dL    BUN/Creatinine ratio 13 12 - 20      GFR est AA >60 >60 ml/min/1.73m2    GFR est non-AA >60 >60 ml/min/1.73m2    Calcium 8.5 8.5 - 10.1 mg/dL    Phosphorus 2.8 2.6 - 4.7 mg/dL    Albumin 3.2 (L) 3.5 - 5.0 g/dL         XR CHEST PORT   Final Result        Chest X-ray 4/4:  Chest single view.     Comparison single view chest January 18, 2022.     Similar coarse reticular markings central and basilar lungs. No gross  interstitial or alveolar pulmonary edema. Cardiac and mediastinal structures  unchanged. Mild blunting left costophrenic angle may be related to small left pleural  effusion and/or pleural thickening/scar. No pneumothorax. Partially imaged cervical hardware. Patient Active Problem List   Diagnosis Code    Hyponatremia E87.1        DIAGNOSES:  1. Hyponatremia  2. Hypokalemia  3.  History of alcohol abuse  4. Alcoholic hepatitis  5. Hypertension, likely due to alcohol withdrawal  DISCUSSION:   Patient can be discharged per internal medicine. Hyponatremia likely d/t fluid overload per patient's history and alcohol dependence. Labs beginning to normalize. Can discontinue NaCl infusion after most recent bag is finished. PLAN:   Hyponatremia  o Patient receiving NaCl 0.9% IV fluids when seen at 250 mL/min. Can continue and finish bag of IV fluids and discontinue fluids after.  Hypokalemia- resolved  o Potassium replaced. Will continue to monitor   Alcoholic hepatitis  o Counseled on alcohol use and cessation. Patient states that he has already looked into options for quitting.  Hypertension  o Continue to monitor. Likely d/t alcohol withdrawl        Thanks for consulting me. Please don't hesitate to contact me if any questions arise of if I can assist in any manner. This dictation was done by dragon, computer voice recognition software. Often unanticipated grammatical, syntax, phones and other interpretive errors are inadvertently transcribed. Please excuse errors that have escaped final proofreading. Please contact me if you suspect dictation or transcription errors. Dr Leesa Kirk  [address] [place] [address], [address] [place] [address], [address] [place] [address]  Cell Phone: [phone]  Office phone: [phone]  Fax: [phone]   *ATTENTION:  This note has been created by a medical student for educational purposes only. Please do not refer to the content of this note for clinical decision-making, billing, or other purposes. Please see attending physicians note to obtain clinical information on this patient. *

## 2022-04-04 NOTE — PROGRESS NOTES
CM in to see patient at bedside. Patient verbalizes no needs at discharge. Patient requesting antiwithdrawal medication at discharge stating he is ready to quit drinking. Patient states he had his daughter poor out the rest of the beer in the house.

## 2022-04-04 NOTE — PROGRESS NOTES
Skin assessment performed with Trevon Hof. No open wounds or healing wounds. Neck is slightly red, but no other skin problems noted. Skin is clean, dry and intact.

## 2022-10-14 ENCOUNTER — TRANSCRIBE ORDER (OUTPATIENT)
Dept: SCHEDULING | Age: 52
End: 2022-10-14

## 2022-10-14 DIAGNOSIS — R93.89 ABNORMAL CXR (CHEST X-RAY): Primary | ICD-10-CM

## 2023-03-27 ENCOUNTER — HOSPITAL ENCOUNTER (OUTPATIENT)
Dept: PHYSICAL THERAPY | Age: 53
End: 2023-03-27
Payer: COMMERCIAL

## 2023-03-27 PROCEDURE — 97167 OT EVAL HIGH COMPLEX 60 MIN: CPT

## 2023-03-27 NOTE — THERAPY EVALUATION
[address] [facility]. [place] [address] [place] [address] Junito Mata  Ph: [phone]   Fax: [phone]    Initial Evaluation/Plan of Care/Statement of Necessity for Occupational Therapy Services     Patient name: José Miguel Way   : 1970  [x]  Patient  Verified Provider#: [id number]    Start of Care: 3/27/23         Referral source: Suzie Galeano MD Return visit to MD: PCP-3/28/23;  Pulmonologist--April     Medical/Treatment Diagnosis: Ankylosing spondylitis of unspecified sites in spine [M45.9]    Payor: [address] [place], [place] [address] / Plan: [employer] General[address] / Product Type: Managed Care Medicaid /       Prior Hospitalization: see medical history     Comorbidities: COPD, arthritis, SIADH  Prior Level of Function: assistance since   Medications: Verified on Patient Summary List          Patient / Family readiness to learn indicated by: asking questions, trying to perform skills, and interest  Persons(s) to be included in education: patient (P) and family support person (FSP); aunt  Barriers to Learning/Limitations: None  Patient Self Reported Health Status: poor  Rehabilitation Potential: fair  Previous Treatment/Compliance: none  PMHx/Surgical Hx: see intake form  Work Hx: n/a  Living Situation: lives alone; single story home with 3 steps down  to addition with railings; 4 steps to porch with bilateral railing  Barriers to progress: comorbidities; time since onset  Motivation: good  Substance use: alcohol and tobacco use  Cognition: A & O x 4   Onset Date:        Visit #:  1    In time:350   Out time:455 Total Treatment Time (min): 65    SUBJECTIVE  Mechanism of Injury: Patient  states he was hospitalized about 2 years ago due to SIADH/ low sodium. Patient states has not had any therapy in many years. Patient had a fall about 1 week ago. He does not use any AD/AE for mobility/transfers.    Patient had assistance from his daughter for ADL/IADLs. Daughter moved out  2023. Patient has an aunt who helps him out with some tasks. Patient states he can cook but he often has meals delivered to his home. Patient  has assistance with some house chores. Patient states it takes him about 2 1/2 hours to get ready in the morning. He needs to start with a breathing treatment before performing any of his ADLs. Patient's aunt present during evaluation/clarified responses, etc. Sindi Khan states patient has been denied disability in the past, has a new date set for 2023. Patient being referred to occupational therapy services for assistance with identifying possible AE/DME that may help him be more independent in the home.        PAIN:  Area of pain: back; all joints  Pain Descriptors:   Pain Level (0-10 scale)     Worst: 10   Least: 6  Things that worsen pain: sitting too long, lying on back  Things that ease pain: pain patch; OTC, topical pain cream  Pain Level (0-10 scale) pre treatment: 8 Pain Level (0-10 scale) post treatment: 7    OBJECTIVE        ADL/IADLs:    Eating:  uses non-dominant (L) hand; items often drop out of his right hand    UB Dressing:  increased time     LB  Dressing:   Socks, underwear, pants take increased time              Bathing:  transfers are difficult; dependent with washing feet  Grooming: mod I  Toileting: difficulty with performing hygiene tasks  Meal Prep/cooking:  light cooking   Laundry: mod I  Household chores :  needs assistance  Driving:  dependent  Medication Management: uses a pill box; gives himself biweekly injections (Humira)  Handwriting:  decreased        Sleep: C-PAP        DME/AE: reacher, grab bar, handheld shower sprayer; uses a magnifying glass  Orthotics/Prosthetics: none    Mobility: independent  Transfers: modified independent/increased time  TU seconds (>= 13  seconds = fall risk)        VISION:   --uses  a contact in the L eye  --had lens replacement in the R eye        Upper Extremity Assessment:    Hand Dominance: right    Opposition:    Right: to IF; <'ed speed to MF, RF; unable to oppose SF   Left: intact     Measurements: Taken with Lino Dynamometer, in lbs   Level 2 DATE  3/27 DATE   Right 12    Left 79      Pinch Measurements: Taken with Pinch Gauge, in lbs    Date  3/27   3 pt    Right 6   Left  20   Lateral    Right 12   Left 18   Tip    Right 5   Left 11     Fine Motor:     Nine-Hole Peg Test:  (in seconds)  Left :  26  Right:  36        SENSATION  Light Touch []WFL          [x] Impaired -RUE   --impaired right  upper arm and distally; hand: ulnar distribution greatest impairment (small finger --pressure)    Stereognosis:     Right     4/5 correct    Left      5/5 correct      Edema: n/a       Additional comments;  Multiple bruising, skin tears, erythema observed--MARY ANN UE               ROM:       Active   DATE:  3/27 3/27     Right Left   Scapula elevation [facility]    ADD [facility]   Shoulder Flex 98 98    Ext WFL WFL    abd 110 130    IR 22 45    ER 62 85   Elbow Flex WFL WFL    Ext -17 0   Forearm Supination 58 72    Pronation WFL WFL   Wrist Flex Min </WFL WFL    Ext 54 78   Finger Flex/ext < ext [facility]    ABD/ADD < ABD WFL         ASSESSMENT/Changes in Function:   Patient is a 48year old right hand dominant male referred to occupational therapy services due to difficulties engaging in daily activities as a result of  ankylosing spondylitis. Patient has/uses limited AE/DME, lives alone. Patient has moderate + difficulty engaging in many BADLs/IADLs. Patient presents with bilateral UE ROM, strength, dexterity limitations; RUE sensory impairment; pain, activity intolerance, generalized weakness. Recent fall reported with TUG (timed up and go) of 16 seconds (> = 13 seconds is a fall risk). Feel patient may benefit from a physical therapy evaluation .   Feel patient will benefit from skilled occupational therapy services to address limitations, promote improved QOL, decrease burden of care.        [facility]:57 % (UE)    Problem List/Impairments: Pain effecting function, Decreased range of motion, Decreased strength, Decreased coordination/prehension, Decreased ADL/functional abilities , Decreased activity tolerance, Decreased flexibility/joint mobility, Decreased transfer abilities, and Sensability    Frequency / Duration: Patient to be seen 1- 2 times per week for 8-10 treatments. Certification Period: 3/27/23 - 6/3/23    Treatment Plan may include any combination of the following: Therapeutic exercise, Therapeutic activities, Neuromuscular re-education, Patient education, and ADLs/IADLs    Patient/ Caregiver education and instruction: self care and other OT services    Patient Goal (s): assistance with daily living    Short Term Goals: To be accomplished in 4-5 treatments:   1. Patient will be mod I with HEP to promote increased independence with daily activities    [] Met [] Not met [] Partially met     2. Patient will participate in session(s) to identify 3 or more potential AE/DME he can use to increase independence with ADLs    [] Met [] Not met [] Partially met     3. Patient will demonstrate/verbalize 2 -4 joint protection techniques he can use to > independence with ADL/IADLs    [] Met [] Not met [] Partially met      Long Term Goals: To be accomplished in 8-10 treatments:   1. Patient will be fall free per report during course of therapy    [] Met [] Not met [] Partially met     2. Patient will be mod I with bathing, shower transfers    [] Met [] Not met [] Partially met     3. Patient will don/doff socks at mod difficulty or less using AE if/as needed    [] Met [] Not met [] Partially met      [x]  Plan of care will be  reviewed with TIWARI if/as indicated. The Plan of Care is based on information from the initial evaluation.       [address] [facility], OTR/L 3/27/2023   ________________________________________________________________________    I certify that the above Therapy Services are being furnished while the patient is under my care. I agree with the treatment plan and certify that this therapy is necessary.     Physician's Signature:____________________  Date:____________Time: _________    Callum Smith MD

## 2023-04-21 DIAGNOSIS — R93.89 ABNORMAL CXR (CHEST X-RAY): Primary | ICD-10-CM

## 2023-04-24 ENCOUNTER — APPOINTMENT (OUTPATIENT)
Dept: PHYSICAL THERAPY | Age: 53
End: 2023-04-24

## 2023-05-15 ENCOUNTER — APPOINTMENT (OUTPATIENT)
Dept: PHYSICAL THERAPY | Age: 53
End: 2023-05-15

## 2023-05-24 RX ORDER — CARVEDILOL 6.25 MG/1
6.25 TABLET ORAL 2 TIMES DAILY
COMMUNITY
Start: 2022-04-04

## 2023-05-24 RX ORDER — LANOLIN ALCOHOL/MO/W.PET/CERES
100 CREAM (GRAM) TOPICAL DAILY
COMMUNITY
Start: 2022-04-05

## 2023-05-24 RX ORDER — VENLAFAXINE HYDROCHLORIDE 75 MG/1
75 CAPSULE, EXTENDED RELEASE ORAL DAILY
COMMUNITY
Start: 2022-04-01

## 2023-05-24 RX ORDER — SODIUM BICARBONATE 650 MG/1
325 TABLET ORAL 2 TIMES DAILY
COMMUNITY
Start: 2022-04-04

## 2023-05-24 RX ORDER — FLUTICASONE FUROATE, UMECLIDINIUM BROMIDE AND VILANTEROL TRIFENATATE 100; 62.5; 25 UG/1; UG/1; UG/1
1 POWDER RESPIRATORY (INHALATION) DAILY
COMMUNITY
Start: 2021-12-20

## 2023-05-24 RX ORDER — ACETAMINOPHEN 325 MG/1
650 TABLET ORAL EVERY 4 HOURS PRN
COMMUNITY
Start: 2022-04-04

## 2023-05-24 RX ORDER — ALBUTEROL SULFATE 90 UG/1
2 AEROSOL, METERED RESPIRATORY (INHALATION) EVERY 4 HOURS PRN
COMMUNITY
Start: 2022-03-15

## 2023-05-24 RX ORDER — LISINOPRIL 20 MG/1
20 TABLET ORAL DAILY
COMMUNITY

## 2023-05-24 RX ORDER — ADALIMUMAB 40MG/0.4ML
40 KIT SUBCUTANEOUS
COMMUNITY
Start: 2022-03-28

## 2023-07-05 ENCOUNTER — HOSPITAL ENCOUNTER (EMERGENCY)
Facility: HOSPITAL | Age: 53
Discharge: HOME | End: 2023-07-05
Attending: EMERGENCY MEDICINE
Payer: COMMERCIAL

## 2023-07-05 VITALS
SYSTOLIC BLOOD PRESSURE: 198 MMHG | OXYGEN SATURATION: 95 % | RESPIRATION RATE: 18 BRPM | HEART RATE: 93 BPM | DIASTOLIC BLOOD PRESSURE: 94 MMHG | TEMPERATURE: 98.1 F | BODY MASS INDEX: 27.28 KG/M2 | WEIGHT: 180 LBS | HEIGHT: 68 IN

## 2023-07-05 DIAGNOSIS — H83.01 LABYRINTHITIS OF RIGHT EAR: Primary | ICD-10-CM

## 2023-07-05 PROCEDURE — 6370000000 HC RX 637 (ALT 250 FOR IP): Performed by: EMERGENCY MEDICINE

## 2023-07-05 PROCEDURE — 96372 THER/PROPH/DIAG INJ SC/IM: CPT

## 2023-07-05 PROCEDURE — 6360000002 HC RX W HCPCS: Performed by: EMERGENCY MEDICINE

## 2023-07-05 PROCEDURE — 99284 EMERGENCY DEPT VISIT MOD MDM: CPT

## 2023-07-05 RX ORDER — ONDANSETRON 4 MG/1
4 TABLET, FILM COATED ORAL 3 TIMES DAILY PRN
Qty: 15 TABLET | Refills: 0 | Status: SHIPPED | OUTPATIENT
Start: 2023-07-05

## 2023-07-05 RX ORDER — MECLIZINE HYDROCHLORIDE 25 MG/1
25 TABLET ORAL 3 TIMES DAILY PRN
Qty: 15 TABLET | Refills: 0 | Status: SHIPPED | OUTPATIENT
Start: 2023-07-05 | End: 2023-07-15

## 2023-07-05 RX ORDER — DEXAMETHASONE SODIUM PHOSPHATE 10 MG/ML
8 INJECTION, SOLUTION INTRAMUSCULAR; INTRAVENOUS ONCE
Status: COMPLETED | OUTPATIENT
Start: 2023-07-05 | End: 2023-07-05

## 2023-07-05 RX ORDER — AMOXICILLIN AND CLAVULANATE POTASSIUM 875; 125 MG/1; MG/1
1 TABLET, FILM COATED ORAL 2 TIMES DAILY
Qty: 14 TABLET | Refills: 0 | Status: SHIPPED | OUTPATIENT
Start: 2023-07-05 | End: 2023-07-12

## 2023-07-05 RX ORDER — MECLIZINE HCL 12.5 MG/1
25 TABLET ORAL
Status: COMPLETED | OUTPATIENT
Start: 2023-07-05 | End: 2023-07-05

## 2023-07-05 RX ORDER — AMOXICILLIN AND CLAVULANATE POTASSIUM 875; 125 MG/1; MG/1
1 TABLET, FILM COATED ORAL
Status: COMPLETED | OUTPATIENT
Start: 2023-07-05 | End: 2023-07-05

## 2023-07-05 RX ORDER — PREDNISONE 20 MG/1
40 TABLET ORAL DAILY
Qty: 10 TABLET | Refills: 0 | Status: SHIPPED | OUTPATIENT
Start: 2023-07-05 | End: 2023-07-10

## 2023-07-05 RX ADMIN — DEXAMETHASONE SODIUM PHOSPHATE 8 MG: 10 INJECTION, SOLUTION INTRAMUSCULAR; INTRAVENOUS at 22:50

## 2023-07-05 RX ADMIN — AMOXICILLIN AND CLAVULANATE POTASSIUM 1 TABLET: 875; 125 TABLET, FILM COATED ORAL at 22:50

## 2023-07-05 RX ADMIN — MECLIZINE 25 MG: 12.5 TABLET ORAL at 22:50

## 2023-07-05 ASSESSMENT — PAIN - FUNCTIONAL ASSESSMENT: PAIN_FUNCTIONAL_ASSESSMENT: 0-10

## 2023-07-05 ASSESSMENT — LIFESTYLE VARIABLES
HOW MANY STANDARD DRINKS CONTAINING ALCOHOL DO YOU HAVE ON A TYPICAL DAY: PATIENT DOES NOT DRINK
HOW OFTEN DO YOU HAVE A DRINK CONTAINING ALCOHOL: NEVER

## 2023-07-06 NOTE — ED TRIAGE NOTES
R ear pain for approx 4 weeks. Over the last 2 days has developed dizziness and N/V. Fever of 101 at home - 800mg Motrin at 2000.

## 2023-07-06 NOTE — ED PROVIDER NOTES
[facility] EMERGENCY DEPT  EMERGENCY DEPARTMENT HISTORY AND PHYSICAL EXAM      Date: 7/5/2023  Patient Name: Pedro [place]  MRN: [id number]  [facility] [facility] 1970  Date of evaluation: 7/5/2023  Provider: Jac Pond MD   Note Started: 10:30 PM EDT 7/5/23    HISTORY OF PRESENT ILLNESS     Chief Complaint   Patient presents with    Otalgia    Nausea    Dizziness       History Provided By: Patient    HPI: Pedro [place] is a 48 y.o. male presenting with right ear pain, dizziness, nausea. Patient states symptoms started yesterday. He had been swimming the day before. He states he felt like he had some water in his ear but could not get it out. He reports pain and fullness to his right ear. He feels dizzy and nauseated especially with movement. reports throat pain that is worse with swallowing and is having difficulty swallowing hard foods. PAST MEDICAL HISTORY   Past Medical History:  Past Medical History:   Diagnosis Date    COPD (chronic obstructive pulmonary disease) ([address])     Hypertension        Past Surgical History:  No past surgical history on file. Family History:  No family history on file. Social History:  Social History     Tobacco Use    Smoking status: Every Day     Packs/day: 1.50     Types: Cigarettes    Smokeless tobacco: Never   Substance Use Topics    Alcohol use: Yes       Allergies:  No Known Allergies    PCP: MITCHEL Loera NP    Current Meds:   No current facility-administered medications for this encounter.      Current Outpatient Medications   Medication Sig Dispense Refill    predniSONE (DELTASONE) 20 MG tablet Take 2 tablets by mouth daily for 5 days 10 tablet 0    amoxicillin-clavulanate (AUGMENTIN) 875-125 MG per tablet Take 1 tablet by mouth 2 times daily for 7 days 14 tablet 0    meclizine (ANTIVERT) 25 MG tablet Take 1 tablet by mouth 3 times daily as needed for Dizziness 15 tablet 0    ondansetron (ZOFRAN) 4 MG tablet Take 1 tablet by mouth 3 times

## 2023-07-11 ENCOUNTER — OFFICE VISIT (OUTPATIENT)
Age: 53
End: 2023-07-11
Payer: COMMERCIAL

## 2023-07-11 VITALS
WEIGHT: 180 LBS | OXYGEN SATURATION: 98 % | SYSTOLIC BLOOD PRESSURE: 158 MMHG | HEIGHT: 68 IN | HEART RATE: 106 BPM | DIASTOLIC BLOOD PRESSURE: 90 MMHG | BODY MASS INDEX: 27.28 KG/M2

## 2023-07-11 DIAGNOSIS — E04.1 THYROID NODULE: Primary | ICD-10-CM

## 2023-07-11 DIAGNOSIS — R13.10 DYSPHAGIA, UNSPECIFIED TYPE: ICD-10-CM

## 2023-07-11 DIAGNOSIS — K14.8 LESION OF TONGUE: ICD-10-CM

## 2023-07-11 DIAGNOSIS — H90.3 SENSORINEURAL HEARING LOSS (SNHL) OF BOTH EARS: Primary | ICD-10-CM

## 2023-07-11 DIAGNOSIS — H91.93 BILATERAL HEARING LOSS, UNSPECIFIED HEARING LOSS TYPE: ICD-10-CM

## 2023-07-11 PROCEDURE — 99202 OFFICE O/P NEW SF 15 MIN: CPT | Performed by: NURSE PRACTITIONER

## 2023-07-11 PROCEDURE — 92557 COMPREHENSIVE HEARING TEST: CPT | Performed by: AUDIOLOGIST

## 2023-07-11 PROCEDURE — 31575 DIAGNOSTIC LARYNGOSCOPY: CPT | Performed by: NURSE PRACTITIONER

## 2023-07-11 ASSESSMENT — ENCOUNTER SYMPTOMS
TROUBLE SWALLOWING: 1
FACIAL SWELLING: 1
SHORTNESS OF BREATH: 1
SORE THROAT: 1
ALLERGIC/IMMUNOLOGIC NEGATIVE: 1
EYES NEGATIVE: 1
COUGH: 1
GASTROINTESTINAL NEGATIVE: 1

## 2023-07-11 NOTE — PROGRESS NOTES
Renu Escalante, a 48y.o. year old male, was seen in clinic today for a hearing evaluation on referral from [facility]. Patient complains of hearing loss and tinnitus (right). He was previously seen for labyrinthitis and while he is reporting improvement he still states that his right ear sounds muffled. Otoscopy: normal external ear canals and visible tympanic membranes, bilaterally. Tympanometry: CNT d/t equipment failure    SRT: RE Speech Reception Threshold (SRT) was obtained at 40 dBHL   LE Speech Reception Threshold (SRT) was obtained at 35 dBHL    WRS: RE Excellent in quiet when words were presented at 80 dBHL. WRS: LE Good in quiet when words were presented at 75 dBHL. Pure tone audiometry:  RE: Borderline/mild sensorineural hearing loss (moderate notch at 1500Hz)  LE: WNL/borderline to mild sensorineural hearing loss    sensorineural hearing loss bilaterally    Impressions:  hearing loss requiring medical/otologic and audiologic follow-up    Plan:  Follow-up with NP. Hearing aid evaluation recommended. Repeat audiogram 1 year or sooner if change is noted.     Santiago Viera   Doctor of Audiology

## 2023-07-19 ENCOUNTER — HOSPITAL ENCOUNTER (OUTPATIENT)
Facility: HOSPITAL | Age: 53
Discharge: HOME | End: 2023-07-22
Payer: COMMERCIAL

## 2023-07-19 DIAGNOSIS — E04.1 THYROID NODULE: ICD-10-CM

## 2023-07-19 DIAGNOSIS — R13.10 DYSPHAGIA, UNSPECIFIED TYPE: ICD-10-CM

## 2023-07-19 DIAGNOSIS — K14.8 LESION OF TONGUE: ICD-10-CM

## 2023-07-19 LAB
BUN SERPL-MCNC: 6 MG/DL (ref 6–20)
CREAT SERPL-MCNC: 0.78 MG/DL (ref 0.7–1.3)

## 2023-07-19 PROCEDURE — 82565 ASSAY OF CREATININE: CPT

## 2023-07-19 PROCEDURE — 36415 COLL VENOUS BLD VENIPUNCTURE: CPT

## 2023-07-19 PROCEDURE — 70491 CT SOFT TISSUE NECK W/DYE: CPT

## 2023-07-19 PROCEDURE — 6360000004 HC RX CONTRAST MEDICATION: Performed by: NURSE PRACTITIONER

## 2023-07-19 PROCEDURE — 84520 ASSAY OF UREA NITROGEN: CPT

## 2023-07-19 RX ADMIN — IOPAMIDOL 100 ML: 755 INJECTION, SOLUTION INTRAVENOUS at 10:02

## 2023-09-14 ENCOUNTER — OFFICE VISIT (OUTPATIENT)
Age: 53
End: 2023-09-14

## 2023-09-14 VITALS
BODY MASS INDEX: 25.31 KG/M2 | DIASTOLIC BLOOD PRESSURE: 100 MMHG | HEIGHT: 68 IN | WEIGHT: 167 LBS | RESPIRATION RATE: 18 BRPM | HEART RATE: 104 BPM | OXYGEN SATURATION: 97 % | SYSTOLIC BLOOD PRESSURE: 184 MMHG

## 2023-09-14 DIAGNOSIS — H90.3 SENSORINEURAL HEARING LOSS (SNHL), BILATERAL: ICD-10-CM

## 2023-09-14 DIAGNOSIS — M45.3 ANKYLOSING SPONDYLITIS OF CERVICOTHORACIC REGION (HCC): ICD-10-CM

## 2023-09-14 DIAGNOSIS — M25.78 CERVICAL OSTEOPHYTE: ICD-10-CM

## 2023-09-14 DIAGNOSIS — R13.14 PHARYNGOESOPHAGEAL DYSPHAGIA: Primary | ICD-10-CM

## 2023-09-14 DIAGNOSIS — H92.01 OTALGIA OF RIGHT EAR: ICD-10-CM

## 2023-09-14 DIAGNOSIS — R13.10 ODYNOPHAGIA: ICD-10-CM

## 2024-02-09 ENCOUNTER — APPOINTMENT (OUTPATIENT)
Facility: HOSPITAL | Age: 54
DRG: 137 | End: 2024-02-09
Payer: COMMERCIAL

## 2024-02-09 ENCOUNTER — HOSPITAL ENCOUNTER (INPATIENT)
Facility: HOSPITAL | Age: 54
LOS: 1 days | Discharge: LEFT BEFORE DISCHARGE | DRG: 137 | End: 2024-02-09
Attending: STUDENT IN AN ORGANIZED HEALTH CARE EDUCATION/TRAINING PROGRAM | Admitting: HOSPITALIST
Payer: COMMERCIAL

## 2024-02-09 VITALS
HEIGHT: 68 IN | WEIGHT: 164 LBS | RESPIRATION RATE: 26 BRPM | OXYGEN SATURATION: 96 % | DIASTOLIC BLOOD PRESSURE: 101 MMHG | BODY MASS INDEX: 24.86 KG/M2 | TEMPERATURE: 98.5 F | SYSTOLIC BLOOD PRESSURE: 159 MMHG | HEART RATE: 101 BPM

## 2024-02-09 DIAGNOSIS — R13.19 ESOPHAGEAL DYSPHAGIA: ICD-10-CM

## 2024-02-09 DIAGNOSIS — E22.2 SIADH (SYNDROME OF INAPPROPRIATE ADH PRODUCTION) (HCC): ICD-10-CM

## 2024-02-09 DIAGNOSIS — I21.4 NSTEMI (NON-ST ELEVATED MYOCARDIAL INFARCTION) (HCC): Primary | ICD-10-CM

## 2024-02-09 DIAGNOSIS — U07.1 COVID-19: ICD-10-CM

## 2024-02-09 DIAGNOSIS — J44.1 COPD EXACERBATION (HCC): ICD-10-CM

## 2024-02-09 DIAGNOSIS — Z20.828 SARS-ASSOCIATED CORONAVIRUS EXPOSURE: ICD-10-CM

## 2024-02-09 DIAGNOSIS — A41.9 SEPTICEMIA (HCC): ICD-10-CM

## 2024-02-09 DIAGNOSIS — E87.1 HYPONATREMIA: ICD-10-CM

## 2024-02-09 PROBLEM — R13.13 PHARYNGEAL DYSPHAGIA: Status: ACTIVE | Noted: 2024-02-09

## 2024-02-09 LAB
25(OH)D3 SERPL-MCNC: 13.8 NG/ML (ref 30–100)
ALBUMIN SERPL-MCNC: 2.5 G/DL (ref 3.5–5)
ALBUMIN SERPL-MCNC: 2.7 G/DL (ref 3.5–5)
ALBUMIN/GLOB SERPL: 0.6 (ref 1.1–2.2)
ALP SERPL-CCNC: 122 U/L (ref 45–117)
ALT SERPL-CCNC: 29 U/L (ref 12–78)
AMPHET UR QL SCN: NEGATIVE
ANION GAP BLD CALC-SCNC: 15
ANION GAP SERPL CALC-SCNC: 11 MMOL/L (ref 5–15)
ANION GAP SERPL CALC-SCNC: 9 MMOL/L (ref 5–15)
APPEARANCE UR: CLEAR
AST SERPL W P-5'-P-CCNC: 50 U/L (ref 15–37)
BACTERIA URNS QL MICRO: NEGATIVE /HPF
BARBITURATES UR QL SCN: NEGATIVE
BASOPHILS # BLD: 0 K/UL (ref 0–0.1)
BASOPHILS NFR BLD: 1 % (ref 0–1)
BENZODIAZ UR QL: NEGATIVE
BILIRUB SERPL-MCNC: 1.1 MG/DL (ref 0.2–1)
BILIRUB UR QL: NEGATIVE
BNP SERPL-MCNC: 5332 PG/ML
BUN SERPL-MCNC: 4 MG/DL (ref 6–20)
BUN SERPL-MCNC: 6 MG/DL (ref 6–20)
BUN/CREAT SERPL: 10 (ref 12–20)
BUN/CREAT SERPL: 6 (ref 12–20)
CA-I BLD-MCNC: 1.11 MMOL/L (ref 1.12–1.32)
CA-I BLD-MCNC: 8.2 MG/DL (ref 8.5–10.1)
CA-I BLD-MCNC: 8.5 MG/DL (ref 8.5–10.1)
CANNABINOIDS UR QL SCN: NEGATIVE
CHLORIDE BLD-SCNC: 80 MMOL/L (ref 98–107)
CHLORIDE SERPL-SCNC: 83 MMOL/L (ref 97–108)
CHLORIDE SERPL-SCNC: 87 MMOL/L (ref 97–108)
CK SERPL-CCNC: 64 U/L (ref 39–308)
CO2 BLD-SCNC: 25 MMOL/L
CO2 SERPL-SCNC: 25 MMOL/L (ref 21–32)
CO2 SERPL-SCNC: 26 MMOL/L (ref 21–32)
COCAINE UR QL SCN: NEGATIVE
COLOR UR: ABNORMAL
CREAT SERPL-MCNC: 0.61 MG/DL (ref 0.7–1.3)
CREAT SERPL-MCNC: 0.64 MG/DL (ref 0.7–1.3)
CREAT UR-MCNC: 0.45 MG/DL (ref 0.6–1.3)
CRP SERPL-MCNC: 4.96 MG/DL (ref 0–0.3)
D DIMER PPP FEU-MCNC: 1.35 UG/ML(FEU)
DIFFERENTIAL METHOD BLD: ABNORMAL
EKG ATRIAL RATE: 116 BPM
EKG ATRIAL RATE: 127 BPM
EKG DIAGNOSIS: NORMAL
EKG DIAGNOSIS: NORMAL
EKG P AXIS: 100 DEGREES
EKG P-R INTERVAL: 146 MS
EKG Q-T INTERVAL: 304 MS
EKG Q-T INTERVAL: 312 MS
EKG QRS DURATION: 72 MS
EKG QRS DURATION: 74 MS
EKG QTC CALCULATION (BAZETT): 433 MS
EKG QTC CALCULATION (BAZETT): 435 MS
EKG R AXIS: 1 DEGREES
EKG R AXIS: 16 DEGREES
EKG T AXIS: 35 DEGREES
EKG T AXIS: 5 DEGREES
EKG VENTRICULAR RATE: 116 BPM
EKG VENTRICULAR RATE: 123 BPM
EOSINOPHIL # BLD: 0 K/UL (ref 0–0.4)
EOSINOPHIL NFR BLD: 0 % (ref 0–7)
EPITH CASTS URNS QL MICRO: ABNORMAL /LPF
ERYTHROCYTE [DISTWIDTH] IN BLOOD BY AUTOMATED COUNT: 14.4 % (ref 11.5–14.5)
ETHANOL SERPL-MCNC: <10 MG/DL (ref 0–0.08)
ETHANOL SERPL-MCNC: <10 MG/DL (ref 0–0.08)
FERRITIN SERPL-MCNC: 162 NG/ML (ref 26–388)
FLUAV AG NPH QL IA: NEGATIVE
FLUBV AG NOSE QL IA: NEGATIVE
FOLATE SERPL-MCNC: 4.9 NG/ML (ref 5–21)
GLOBULIN SER CALC-MCNC: 4.4 G/DL (ref 2–4)
GLUCOSE BLD STRIP.AUTO-MCNC: 120 MG/DL (ref 65–100)
GLUCOSE SERPL-MCNC: 119 MG/DL (ref 65–100)
GLUCOSE SERPL-MCNC: 149 MG/DL (ref 65–100)
GLUCOSE UR STRIP.AUTO-MCNC: NEGATIVE MG/DL
HCT VFR BLD AUTO: 27.3 % (ref 36.6–50.3)
HGB BLD-MCNC: 9.7 G/DL (ref 12.1–17)
HGB UR QL STRIP: ABNORMAL
IMM GRANULOCYTES # BLD AUTO: 0 K/UL (ref 0–0.04)
IMM GRANULOCYTES NFR BLD AUTO: 1 % (ref 0–0.5)
IRON SATN MFR SERPL: 13 % (ref 20–50)
IRON SERPL-MCNC: 28 UG/DL (ref 35–150)
KETONES UR QL STRIP.AUTO: NEGATIVE MG/DL
LACTATE BLD-SCNC: 1.41 MMOL/L (ref 0.4–2)
LACTATE BLD-SCNC: 4.48 MMOL/L (ref 0.4–2)
LDH SERPL L TO P-CCNC: 192 U/L (ref 85–241)
LEUKOCYTE ESTERASE UR QL STRIP.AUTO: NEGATIVE
LYMPHOCYTES # BLD: 0.6 K/UL (ref 0.8–3.5)
LYMPHOCYTES NFR BLD: 10 % (ref 12–49)
Lab: NORMAL
MAGNESIUM SERPL-MCNC: 1.9 MG/DL (ref 1.6–2.4)
MCH RBC QN AUTO: 31.9 PG (ref 26–34)
MCHC RBC AUTO-ENTMCNC: 35.5 G/DL (ref 30–36.5)
MCV RBC AUTO: 89.8 FL (ref 80–99)
METHADONE UR QL: NEGATIVE
MONOCYTES # BLD: 0.9 K/UL (ref 0–1)
MONOCYTES NFR BLD: 16 % (ref 5–13)
MRSA DNA SPEC QL NAA+PROBE: NOT DETECTED
NEUTS SEG # BLD: 4 K/UL (ref 1.8–8)
NEUTS SEG NFR BLD: 72 % (ref 32–75)
NITRITE UR QL STRIP.AUTO: NEGATIVE
NRBC # BLD: 0 K/UL (ref 0–0.01)
NRBC BLD-RTO: 0 PER 100 WBC
OPIATES UR QL: NEGATIVE
OSMOLALITY UR: 141 MOSM/KG H2O
PCP UR QL: NEGATIVE
PERFORMED BY:: ABNORMAL
PERFORMED BY:: NORMAL
PH UR STRIP: 6 (ref 5–8)
PHOSPHATE SERPL-MCNC: 2.4 MG/DL (ref 2.6–4.7)
PHOSPHATE SERPL-MCNC: 2.6 MG/DL (ref 2.6–4.7)
PLATELET # BLD AUTO: 139 K/UL (ref 150–400)
PMV BLD AUTO: 9.6 FL (ref 8.9–12.9)
POTASSIUM BLD-SCNC: 4.1 MMOL/L (ref 3.5–5.5)
POTASSIUM SERPL-SCNC: 3.4 MMOL/L (ref 3.5–5.1)
POTASSIUM SERPL-SCNC: 3.7 MMOL/L (ref 3.5–5.1)
PROCALCITONIN SERPL-MCNC: 0.08 NG/ML
PROT SERPL-MCNC: 7.1 G/DL (ref 6.4–8.2)
PROT UR STRIP-MCNC: NEGATIVE MG/DL
RBC # BLD AUTO: 3.04 M/UL (ref 4.1–5.7)
RBC #/AREA URNS HPF: ABNORMAL /HPF (ref 0–5)
SARS-COV-2 RDRP RESP QL NAA+PROBE: DETECTED
SERVICE CMNT-IMP: ABNORMAL
SODIUM BLD-SCNC: 119 MMOL/L (ref 136–145)
SODIUM SERPL-SCNC: 118 MMOL/L (ref 136–145)
SODIUM SERPL-SCNC: 123 MMOL/L (ref 136–145)
SODIUM UR-SCNC: 10 MMOL/L
SP GR UR REFRACTOMETRY: 1 (ref 1–1.03)
SPECIMEN SITE: ABNORMAL
TIBC SERPL-MCNC: 216 UG/DL (ref 250–450)
TROPONIN I SERPL HS-MCNC: 1040 NG/L (ref 0–76)
TROPONIN I SERPL HS-MCNC: 357 NG/L (ref 0–76)
TROPONIN I SERPL HS-MCNC: 412 NG/L (ref 0–76)
TROPONIN I SERPL HS-MCNC: 425 NG/L (ref 0–76)
TROPONIN I SERPL HS-MCNC: 623 NG/L (ref 0–76)
TROPONIN I SERPL HS-MCNC: 853 NG/L (ref 0–76)
TSH SERPL DL<=0.05 MIU/L-ACNC: 0.93 UIU/ML (ref 0.36–3.74)
URATE SERPL-MCNC: 3.9 MG/DL (ref 3.5–7.2)
URINE CULTURE IF INDICATED: ABNORMAL
UROBILINOGEN UR QL STRIP.AUTO: 2 EU/DL (ref 0.1–1)
VIT B12 SERPL-MCNC: 787 PG/ML (ref 193–986)
WBC # BLD AUTO: 5.5 K/UL (ref 4.1–11.1)
WBC URNS QL MICRO: ABNORMAL /HPF (ref 0–4)

## 2024-02-09 PROCEDURE — 96365 THER/PROPH/DIAG IV INF INIT: CPT

## 2024-02-09 PROCEDURE — 96375 TX/PRO/DX INJ NEW DRUG ADDON: CPT

## 2024-02-09 PROCEDURE — 6360000002 HC RX W HCPCS: Performed by: HOSPITALIST

## 2024-02-09 PROCEDURE — 71045 X-RAY EXAM CHEST 1 VIEW: CPT

## 2024-02-09 PROCEDURE — 85379 FIBRIN DEGRADATION QUANT: CPT

## 2024-02-09 PROCEDURE — 84100 ASSAY OF PHOSPHORUS: CPT

## 2024-02-09 PROCEDURE — 83615 LACTATE (LD) (LDH) ENZYME: CPT

## 2024-02-09 PROCEDURE — 6370000000 HC RX 637 (ALT 250 FOR IP): Performed by: STUDENT IN AN ORGANIZED HEALTH CARE EDUCATION/TRAINING PROGRAM

## 2024-02-09 PROCEDURE — 6360000002 HC RX W HCPCS: Performed by: INTERNAL MEDICINE

## 2024-02-09 PROCEDURE — 6370000000 HC RX 637 (ALT 250 FOR IP): Performed by: HOSPITALIST

## 2024-02-09 PROCEDURE — 82077 ASSAY SPEC XCP UR&BREATH IA: CPT

## 2024-02-09 PROCEDURE — 83735 ASSAY OF MAGNESIUM: CPT

## 2024-02-09 PROCEDURE — 80053 COMPREHEN METABOLIC PANEL: CPT

## 2024-02-09 PROCEDURE — 1100000000 HC RM PRIVATE

## 2024-02-09 PROCEDURE — 84550 ASSAY OF BLOOD/URIC ACID: CPT

## 2024-02-09 PROCEDURE — 83880 ASSAY OF NATRIURETIC PEPTIDE: CPT

## 2024-02-09 PROCEDURE — 2580000003 HC RX 258: Performed by: HOSPITALIST

## 2024-02-09 PROCEDURE — 83935 ASSAY OF URINE OSMOLALITY: CPT

## 2024-02-09 PROCEDURE — 84443 ASSAY THYROID STIM HORMONE: CPT

## 2024-02-09 PROCEDURE — 84484 ASSAY OF TROPONIN QUANT: CPT

## 2024-02-09 PROCEDURE — 36415 COLL VENOUS BLD VENIPUNCTURE: CPT

## 2024-02-09 PROCEDURE — 83540 ASSAY OF IRON: CPT

## 2024-02-09 PROCEDURE — 99285 EMERGENCY DEPT VISIT HI MDM: CPT

## 2024-02-09 PROCEDURE — 87635 SARS-COV-2 COVID-19 AMP PRB: CPT

## 2024-02-09 PROCEDURE — 82607 VITAMIN B-12: CPT

## 2024-02-09 PROCEDURE — 96368 THER/DIAG CONCURRENT INF: CPT

## 2024-02-09 PROCEDURE — 84300 ASSAY OF URINE SODIUM: CPT

## 2024-02-09 PROCEDURE — 85025 COMPLETE CBC W/AUTO DIFF WBC: CPT

## 2024-02-09 PROCEDURE — 86140 C-REACTIVE PROTEIN: CPT

## 2024-02-09 PROCEDURE — 82550 ASSAY OF CK (CPK): CPT

## 2024-02-09 PROCEDURE — 2500000003 HC RX 250 WO HCPCS: Performed by: STUDENT IN AN ORGANIZED HEALTH CARE EDUCATION/TRAINING PROGRAM

## 2024-02-09 PROCEDURE — 94640 AIRWAY INHALATION TREATMENT: CPT

## 2024-02-09 PROCEDURE — 86738 MYCOPLASMA ANTIBODY: CPT

## 2024-02-09 PROCEDURE — 2580000003 HC RX 258: Performed by: STUDENT IN AN ORGANIZED HEALTH CARE EDUCATION/TRAINING PROGRAM

## 2024-02-09 PROCEDURE — 87641 MR-STAPH DNA AMP PROBE: CPT

## 2024-02-09 PROCEDURE — 84145 PROCALCITONIN (PCT): CPT

## 2024-02-09 PROCEDURE — 83605 ASSAY OF LACTIC ACID: CPT

## 2024-02-09 PROCEDURE — 6370000000 HC RX 637 (ALT 250 FOR IP): Performed by: INTERNAL MEDICINE

## 2024-02-09 PROCEDURE — 87040 BLOOD CULTURE FOR BACTERIA: CPT

## 2024-02-09 PROCEDURE — 6360000002 HC RX W HCPCS: Performed by: STUDENT IN AN ORGANIZED HEALTH CARE EDUCATION/TRAINING PROGRAM

## 2024-02-09 PROCEDURE — 80307 DRUG TEST PRSMV CHEM ANLYZR: CPT

## 2024-02-09 PROCEDURE — 82728 ASSAY OF FERRITIN: CPT

## 2024-02-09 PROCEDURE — 80047 BASIC METABLC PNL IONIZED CA: CPT

## 2024-02-09 PROCEDURE — 82746 ASSAY OF FOLIC ACID SERUM: CPT

## 2024-02-09 PROCEDURE — 2580000003 HC RX 258: Performed by: INTERNAL MEDICINE

## 2024-02-09 PROCEDURE — 82306 VITAMIN D 25 HYDROXY: CPT

## 2024-02-09 PROCEDURE — 81001 URINALYSIS AUTO W/SCOPE: CPT

## 2024-02-09 PROCEDURE — 96372 THER/PROPH/DIAG INJ SC/IM: CPT

## 2024-02-09 PROCEDURE — 93005 ELECTROCARDIOGRAM TRACING: CPT | Performed by: STUDENT IN AN ORGANIZED HEALTH CARE EDUCATION/TRAINING PROGRAM

## 2024-02-09 PROCEDURE — 87804 INFLUENZA ASSAY W/OPTIC: CPT

## 2024-02-09 PROCEDURE — 80069 RENAL FUNCTION PANEL: CPT

## 2024-02-09 RX ORDER — MIRTAZAPINE 15 MG/1
15 TABLET, FILM COATED ORAL NIGHTLY
COMMUNITY
Start: 2024-01-17

## 2024-02-09 RX ORDER — HEPARIN SODIUM 1000 [USP'U]/ML
30 INJECTION, SOLUTION INTRAVENOUS; SUBCUTANEOUS PRN
Status: DISCONTINUED | OUTPATIENT
Start: 2024-02-09 | End: 2024-02-09

## 2024-02-09 RX ORDER — TIOTROPIUM BROMIDE INHALATION SPRAY 3.12 UG/1
2 SPRAY, METERED RESPIRATORY (INHALATION)
COMMUNITY
Start: 2024-01-16

## 2024-02-09 RX ORDER — HEPARIN SODIUM 1000 [USP'U]/ML
4000 INJECTION, SOLUTION INTRAVENOUS; SUBCUTANEOUS PRN
Status: DISCONTINUED | OUTPATIENT
Start: 2024-02-09 | End: 2024-02-09 | Stop reason: HOSPADM

## 2024-02-09 RX ORDER — PANTOPRAZOLE SODIUM 40 MG/1
40 TABLET, DELAYED RELEASE ORAL
Status: DISCONTINUED | OUTPATIENT
Start: 2024-02-09 | End: 2024-02-09 | Stop reason: HOSPADM

## 2024-02-09 RX ORDER — SODIUM CHLORIDE 0.9 % (FLUSH) 0.9 %
5-40 SYRINGE (ML) INJECTION PRN
Status: DISCONTINUED | OUTPATIENT
Start: 2024-02-09 | End: 2024-02-09 | Stop reason: HOSPADM

## 2024-02-09 RX ORDER — SODIUM CHLORIDE 9 MG/ML
30 INJECTION, SOLUTION INTRAVENOUS ONCE
Status: DISCONTINUED | OUTPATIENT
Start: 2024-02-09 | End: 2024-02-09

## 2024-02-09 RX ORDER — HEPARIN SODIUM 1000 [USP'U]/ML
2000 INJECTION, SOLUTION INTRAVENOUS; SUBCUTANEOUS PRN
Status: DISCONTINUED | OUTPATIENT
Start: 2024-02-09 | End: 2024-02-09 | Stop reason: HOSPADM

## 2024-02-09 RX ORDER — DEXAMETHASONE SODIUM PHOSPHATE 4 MG/ML
6 INJECTION, SOLUTION INTRA-ARTICULAR; INTRALESIONAL; INTRAMUSCULAR; INTRAVENOUS; SOFT TISSUE EVERY 24 HOURS
Status: DISCONTINUED | OUTPATIENT
Start: 2024-02-09 | End: 2024-02-09 | Stop reason: HOSPADM

## 2024-02-09 RX ORDER — FOLIC ACID 1 MG/1
1 TABLET ORAL DAILY
Status: DISCONTINUED | OUTPATIENT
Start: 2024-02-09 | End: 2024-02-09 | Stop reason: HOSPADM

## 2024-02-09 RX ORDER — HEPARIN SODIUM 10000 [USP'U]/100ML
5-30 INJECTION, SOLUTION INTRAVENOUS CONTINUOUS
Status: DISCONTINUED | OUTPATIENT
Start: 2024-02-09 | End: 2024-02-09 | Stop reason: HOSPADM

## 2024-02-09 RX ORDER — HEPARIN SODIUM 1000 [USP'U]/ML
4000 INJECTION, SOLUTION INTRAVENOUS; SUBCUTANEOUS ONCE
Status: DISCONTINUED | OUTPATIENT
Start: 2024-02-09 | End: 2024-02-09 | Stop reason: HOSPADM

## 2024-02-09 RX ORDER — 0.9 % SODIUM CHLORIDE 0.9 %
1000 INTRAVENOUS SOLUTION INTRAVENOUS ONCE
Status: DISCONTINUED | OUTPATIENT
Start: 2024-02-09 | End: 2024-02-09

## 2024-02-09 RX ORDER — ONDANSETRON 4 MG/1
4 TABLET, ORALLY DISINTEGRATING ORAL EVERY 8 HOURS PRN
Status: DISCONTINUED | OUTPATIENT
Start: 2024-02-09 | End: 2024-02-09 | Stop reason: HOSPADM

## 2024-02-09 RX ORDER — GAUZE BANDAGE 2" X 2"
100 BANDAGE TOPICAL DAILY
Status: DISCONTINUED | OUTPATIENT
Start: 2024-02-09 | End: 2024-02-09 | Stop reason: HOSPADM

## 2024-02-09 RX ORDER — ACETAMINOPHEN 325 MG/1
650 TABLET ORAL EVERY 6 HOURS PRN
Status: DISCONTINUED | OUTPATIENT
Start: 2024-02-09 | End: 2024-02-09 | Stop reason: HOSPADM

## 2024-02-09 RX ORDER — MAGNESIUM SULFATE IN WATER 40 MG/ML
2000 INJECTION, SOLUTION INTRAVENOUS
Status: COMPLETED | OUTPATIENT
Start: 2024-02-09 | End: 2024-02-09

## 2024-02-09 RX ORDER — TERBUTALINE SULFATE 1 MG/ML
0.25 INJECTION, SOLUTION SUBCUTANEOUS ONCE
Status: COMPLETED | OUTPATIENT
Start: 2024-02-09 | End: 2024-02-09

## 2024-02-09 RX ORDER — MIRTAZAPINE 15 MG/1
15 TABLET, FILM COATED ORAL NIGHTLY
Status: DISCONTINUED | OUTPATIENT
Start: 2024-02-09 | End: 2024-02-09 | Stop reason: HOSPADM

## 2024-02-09 RX ORDER — ALBUTEROL SULFATE 90 UG/1
2 AEROSOL, METERED RESPIRATORY (INHALATION) EVERY 4 HOURS PRN
Status: DISCONTINUED | OUTPATIENT
Start: 2024-02-09 | End: 2024-02-09 | Stop reason: HOSPADM

## 2024-02-09 RX ORDER — GUAIFENESIN 600 MG/1
600 TABLET, EXTENDED RELEASE ORAL 2 TIMES DAILY
Status: DISCONTINUED | OUTPATIENT
Start: 2024-02-09 | End: 2024-02-09 | Stop reason: HOSPADM

## 2024-02-09 RX ORDER — NICOTINE 21 MG/24HR
1 PATCH, TRANSDERMAL 24 HOURS TRANSDERMAL DAILY
Status: DISCONTINUED | OUTPATIENT
Start: 2024-02-09 | End: 2024-02-09 | Stop reason: HOSPADM

## 2024-02-09 RX ORDER — ACETAMINOPHEN 500 MG
1000 TABLET ORAL ONCE
Status: COMPLETED | OUTPATIENT
Start: 2024-02-09 | End: 2024-02-09

## 2024-02-09 RX ORDER — BUDESONIDE AND FORMOTEROL FUMARATE DIHYDRATE 80; 4.5 UG/1; UG/1
2 AEROSOL RESPIRATORY (INHALATION)
Status: DISCONTINUED | OUTPATIENT
Start: 2024-02-09 | End: 2024-02-09 | Stop reason: HOSPADM

## 2024-02-09 RX ORDER — 0.9 % SODIUM CHLORIDE 0.9 %
30 INTRAVENOUS SOLUTION INTRAVENOUS ONCE
Status: COMPLETED | OUTPATIENT
Start: 2024-02-09 | End: 2024-02-09

## 2024-02-09 RX ORDER — ACETAMINOPHEN 650 MG/1
650 SUPPOSITORY RECTAL EVERY 6 HOURS PRN
Status: DISCONTINUED | OUTPATIENT
Start: 2024-02-09 | End: 2024-02-09 | Stop reason: HOSPADM

## 2024-02-09 RX ORDER — ENOXAPARIN SODIUM 100 MG/ML
40 INJECTION SUBCUTANEOUS DAILY
Status: DISCONTINUED | OUTPATIENT
Start: 2024-02-09 | End: 2024-02-09

## 2024-02-09 RX ORDER — LORAZEPAM 1 MG/1
1 TABLET ORAL ONCE
Status: COMPLETED | OUTPATIENT
Start: 2024-02-09 | End: 2024-02-09

## 2024-02-09 RX ORDER — IPRATROPIUM BROMIDE AND ALBUTEROL SULFATE 2.5; .5 MG/3ML; MG/3ML
1 SOLUTION RESPIRATORY (INHALATION)
Status: DISCONTINUED | OUTPATIENT
Start: 2024-02-09 | End: 2024-02-09

## 2024-02-09 RX ORDER — ONDANSETRON 2 MG/ML
4 INJECTION INTRAMUSCULAR; INTRAVENOUS EVERY 6 HOURS PRN
Status: DISCONTINUED | OUTPATIENT
Start: 2024-02-09 | End: 2024-02-09 | Stop reason: HOSPADM

## 2024-02-09 RX ORDER — ERGOCALCIFEROL 1.25 MG/1
50000 CAPSULE ORAL WEEKLY
Status: DISCONTINUED | OUTPATIENT
Start: 2024-02-09 | End: 2024-02-09 | Stop reason: HOSPADM

## 2024-02-09 RX ORDER — SODIUM CHLORIDE 0.9 % (FLUSH) 0.9 %
5-40 SYRINGE (ML) INJECTION EVERY 12 HOURS SCHEDULED
Status: DISCONTINUED | OUTPATIENT
Start: 2024-02-09 | End: 2024-02-09 | Stop reason: HOSPADM

## 2024-02-09 RX ORDER — LORAZEPAM 2 MG/ML
1 INJECTION INTRAMUSCULAR EVERY 4 HOURS PRN
Status: DISCONTINUED | OUTPATIENT
Start: 2024-02-09 | End: 2024-02-09 | Stop reason: HOSPADM

## 2024-02-09 RX ORDER — SODIUM CHLORIDE 9 MG/ML
INJECTION, SOLUTION INTRAVENOUS PRN
Status: DISCONTINUED | OUTPATIENT
Start: 2024-02-09 | End: 2024-02-09 | Stop reason: HOSPADM

## 2024-02-09 RX ORDER — DILTIAZEM HYDROCHLORIDE 60 MG/1
2 TABLET, FILM COATED ORAL
COMMUNITY
Start: 2023-12-18

## 2024-02-09 RX ORDER — GUAIFENESIN 200 MG/10ML
200 LIQUID ORAL ONCE
Status: COMPLETED | OUTPATIENT
Start: 2024-02-09 | End: 2024-02-09

## 2024-02-09 RX ORDER — HEPARIN SODIUM 1000 [USP'U]/ML
60 INJECTION, SOLUTION INTRAVENOUS; SUBCUTANEOUS PRN
Status: DISCONTINUED | OUTPATIENT
Start: 2024-02-09 | End: 2024-02-09

## 2024-02-09 RX ORDER — SODIUM CHLORIDE 9 MG/ML
INJECTION, SOLUTION INTRAVENOUS CONTINUOUS
Status: DISCONTINUED | OUTPATIENT
Start: 2024-02-09 | End: 2024-02-09 | Stop reason: HOSPADM

## 2024-02-09 RX ORDER — HEPARIN SODIUM 1000 [USP'U]/ML
60 INJECTION, SOLUTION INTRAVENOUS; SUBCUTANEOUS ONCE
Status: DISCONTINUED | OUTPATIENT
Start: 2024-02-09 | End: 2024-02-09

## 2024-02-09 RX ADMIN — CEFTRIAXONE SODIUM 1000 MG: 1 INJECTION, POWDER, FOR SOLUTION INTRAMUSCULAR; INTRAVENOUS at 03:31

## 2024-02-09 RX ADMIN — Medication 2 PUFF: at 08:42

## 2024-02-09 RX ADMIN — AZITHROMYCIN MONOHYDRATE 500 MG: 500 INJECTION, POWDER, LYOPHILIZED, FOR SOLUTION INTRAVENOUS at 03:32

## 2024-02-09 RX ADMIN — METHYLPREDNISOLONE SODIUM SUCCINATE 125 MG: 125 INJECTION INTRAMUSCULAR; INTRAVENOUS at 04:51

## 2024-02-09 RX ADMIN — THIAMINE HCL TAB 100 MG 100 MG: 100 TAB at 08:24

## 2024-02-09 RX ADMIN — SODIUM CHLORIDE: 9 INJECTION, SOLUTION INTRAVENOUS at 13:33

## 2024-02-09 RX ADMIN — ACETAMINOPHEN 1000 MG: 500 TABLET ORAL at 03:32

## 2024-02-09 RX ADMIN — MAGNESIUM SULFATE HEPTAHYDRATE 2000 MG: 40 INJECTION, SOLUTION INTRAVENOUS at 04:53

## 2024-02-09 RX ADMIN — LORAZEPAM 1 MG: 1 TABLET ORAL at 06:17

## 2024-02-09 RX ADMIN — BUDESONIDE AND FORMOTEROL FUMARATE DIHYDRATE 2 PUFF: 80; 4.5 AEROSOL RESPIRATORY (INHALATION) at 08:42

## 2024-02-09 RX ADMIN — TERBUTALINE SULFATE 0.25 MG: 1 INJECTION, SOLUTION SUBCUTANEOUS at 06:18

## 2024-02-09 RX ADMIN — PANTOPRAZOLE SODIUM 40 MG: 40 TABLET, DELAYED RELEASE ORAL at 08:24

## 2024-02-09 RX ADMIN — ENOXAPARIN SODIUM 40 MG: 100 INJECTION SUBCUTANEOUS at 08:24

## 2024-02-09 RX ADMIN — FOLIC ACID 1 MG: 1 TABLET ORAL at 08:24

## 2024-02-09 RX ADMIN — PIPERACILLIN AND TAZOBACTAM 3375 MG: 3; .375 INJECTION, POWDER, FOR SOLUTION INTRAVENOUS at 18:00

## 2024-02-09 RX ADMIN — SODIUM CHLORIDE, PRESERVATIVE FREE 10 ML: 5 INJECTION INTRAVENOUS at 11:42

## 2024-02-09 RX ADMIN — POTASSIUM BICARBONATE 40 MEQ: 782 TABLET, EFFERVESCENT ORAL at 18:00

## 2024-02-09 RX ADMIN — SODIUM CHLORIDE 1000 ML: 9 INJECTION, SOLUTION INTRAVENOUS at 03:40

## 2024-02-09 RX ADMIN — DEXAMETHASONE SODIUM PHOSPHATE 6 MG: 4 INJECTION INTRA-ARTICULAR; INTRALESIONAL; INTRAMUSCULAR; INTRAVENOUS; SOFT TISSUE at 18:00

## 2024-02-09 RX ADMIN — GUAIFENESIN 200 MG: 200 SOLUTION ORAL at 06:17

## 2024-02-09 ASSESSMENT — PAIN DESCRIPTION - LOCATION: LOCATION: GENERALIZED

## 2024-02-09 ASSESSMENT — HEART SCORE: ECG: 1

## 2024-02-09 ASSESSMENT — LIFESTYLE VARIABLES
HOW MANY STANDARD DRINKS CONTAINING ALCOHOL DO YOU HAVE ON A TYPICAL DAY: PATIENT DOES NOT DRINK
HOW OFTEN DO YOU HAVE A DRINK CONTAINING ALCOHOL: NEVER
HOW OFTEN DO YOU HAVE A DRINK CONTAINING ALCOHOL: 4 OR MORE TIMES A WEEK
HOW MANY STANDARD DRINKS CONTAINING ALCOHOL DO YOU HAVE ON A TYPICAL DAY: 5 OR 6

## 2024-02-09 ASSESSMENT — PAIN SCALES - GENERAL: PAINLEVEL_OUTOF10: 10

## 2024-02-09 NOTE — PROGRESS NOTES
Send message to attending, dr. Potter regarding coarse lung sounds and iv fluids. No response.   1215- send message-elevated troponin 623 to dr. Potter.   1300- spoke with dr. Potter. Inform md of coarse lung sounds. Elevated troponin 623. Receive orders as noted

## 2024-02-09 NOTE — CONSULTS
Cardiology Consult    NAME: Oumar Merida III   :  1970   MRN:  [id number]     Date/Time:  2024 7:45 AM    Patient PCP: Rayshawn Bloom APRN - NP  ________________________________________________________________________     Assessment:     Patient is a 53-year-old white male with:  1.  COVID pneumonia  2.  Alcohol abuse  3.  COPD  4.  Non-STEMI, possibly type II  5.  Hypertension  6.  Ankylosing spondylitis  7.  SIADH  8.  Anemia        Plan:     Sodium is 118, potassium is 3.7.  Patient with history of SIADH.  Troponin is at 412.  Flat over the last 2 draws.    Albumin 2.7,  Blood pressure is normal  Currently on Lovenox for DVT prophylaxis.  Currently on nicotine patches  He is counseled regarding the importance of smoking cessation and alcohol abstinence  Check echocardiogram  Conservative management from cardiac standpoint        []        High complexity decision making was performed        Subjective:   CHIEF COMPLAINT:   Shortness of breath and generalized bodyaches    REASON FOR CONSULT:  Elevated troponin    HISTORY OF PRESENT ILLNESS:     Oumar Merida III is a 53 y.o. White (non-) male who has a history of ankylosing spondylitis, SIADH, hyponatremia, hypertension, nicotine dependence and alcohol abuse.    Presented with worsening shortness of breath and generalized bodyaches.  Very restless.  Hard to sleep.  Denied having any chest pain or chest tightness or nausea, vomiting or excessive sweating.  He has cut back his in alcohol intake recently.  He lost about 100 pounds over the last 1 year.  He has been having difficulty swallowing solid food.  He tested positive for COVID-19.        Past Medical History:   Diagnosis Date    Ankylosing spondylitis (HCC)     COPD (chronic obstructive pulmonary disease) (HCC)     Hypertension     Hyponatremia     SIADH (syndrome of inappropriate ADH production) (HCC)       Past Surgical History:   Procedure Laterality Date    CERVICAL

## 2024-02-09 NOTE — ED NOTES
Assumed care of patient. Bedside shift report received from CINDY Floyd at this time. Pt resting quietly at this time without complaint.

## 2024-02-09 NOTE — CONSULTS
Renal Consultation Note    NAME:  Oumar Merida III   :   1970   MRN:   [id number]     ATTENDING: Jed Call MD  PCP:  Rayshawn Bloom APRN - NP    Date/Time:  2024 12:29 PM      Subjective:   REQUESTING PHYSICIAN:  REASON FOR CONSULT:    Hyponatremia  Patient is a 53-year-old  male with a history of nicotine dependence, COPD, EtOH abuse, hypertension, chronic hyponatremia, and rheumatoid arthritis who presented to the hospital with myalgias, chills, and dysphagia and was found to test positive for COVID-19.  Patient seen and examined in his room in the emergency department this afternoon, states that over the past 24 hours she has had some dysphagia, myalgias, and chills which brought him into the hospital where he tested positive for COVID-19, negative for influenza.    sodium level 118 which prompted a renal consultation.  Sodium today has improved to 123.  On normal saline 75 cc/h.  Renal function stable,   Patient seen in the ER.  He has been having dysphagia for the past year for solids.  He has also lost almost 100 pounds.  However unable to see GI because of his sickness.  He is on immunosuppressive medication including Humira every 2 weeks and follows with rheumatology today.  He is still smoking    Past Medical History:   Diagnosis Date    Ankylosing spondylitis ([facility])     COPD (chronic obstructive pulmonary disease) ([facility])     Hypertension     Hyponatremia     SIADH (syndrome of inappropriate ADH production) ([facility])       Past Surgical History:   Procedure Laterality Date    CERVICAL LAMINECTOMY      C6 laminectomy    HIP ARTHROPLASTY      left hip    SPINAL FUSION      cervico thoracic fusion C2-T4     Social History     Tobacco Use    Smoking status: Former     Current packs/day: 0.00     Types: Cigarettes     Quit date: 2022     Years since quittin.1    Smokeless tobacco: Current    Tobacco comments:     Dipping    Substance Use Topics    Alcohol  edema noted,  pedal pulses palpable.  Musculoskeletal :  no joint swelling or effusion, muscle tone and power appears normal.   Skin : Both hands with multiple bruises that are healing, states that he had a fall, .  Neurological : Awake, alert, oriented to time place person.  No focal deficits.          LAB DATA REVIEWED:      Recent Labs     02/09/24 0318   WBC 5.5   HGB 9.7*   HCT 27.3*   *     Recent Labs     02/09/24  0724 02/09/24  0537 02/09/24  0320 02/09/24 0318   *  --   --  118*   K 3.4*  --   --  3.7   CL 87*  --   --  83*   CO2 25  --   --  26   BUN 6  --   --  4*   CREATININE 0.61*  --  0.45* 0.64*   MG  --  1.9  --   --    PHOS 2.6 2.4*  --   --    ALT  --   --   --  29     Invalid input(s): \"GLPOC\"  No results for input(s): \"PH\", \"PCO2\", \"PO2\", \"HCO3\", \"FIO2\" in the last 720 hours.  No results for input(s): \"INR\", \"INREXT\" in the last 720 hours.    Recommendations/Plan:     #1 hyponatremia  -Severe in intensity.  -Sodium was 118 on admission which is improved to 123 today after receiving normal saline in the ER.  I will continue isotonic saline at 75 mL/h  -Check urine sodium and urine osmolality  -Appropriate rate of correction so far    #2 hypokalemia  -Potassium is low at 3.4 which I will correct    #3 COVID-19  -He has no worsening of respiratory symptoms.  Continue supportive treatment.  No need for steroids yet    #4 dysphagia oropharyngeal  -Patient has weight loss of almost 100 pounds in the past few months.  GI has been consulted    Thank you for the consultation  ________________________________________________________________________  Signed: Jesenia Cha MD

## 2024-02-09 NOTE — ED TRIAGE NOTES
Patient presents to ED c/o shortness of breath beginning yesterday. Hx COPD    Decreased appetite and significant weight loss over the last year.

## 2024-02-09 NOTE — CONSULTS
53 y.o. male with a history of alcohol abuse,  presents to the emergency room complaining of increased shortness of breath from his baseline.  Feels very restless, will be admitted for positive for COVID-19.   respiratory symptoms, but he always feels fine, has no fever no chills no coughing no sputum induction, he is not oxygen, able to go home, last drink was yesterday with 2 drink,     Had dysphagia oropharyngeal for 1 year,  lost weight almost 100 pounds.  He was made an appointment with gastroenterology for yesterday but he     He is on immunosuppressive medication Humira every 2 weeks and following with rheumatology for his RA     Past Medical History        Past Medical History:   Diagnosis Date    COPD (chronic obstructive pulmonary disease) (HCC)      Hypertension      Rheumatoid arthritis (HCC)       Rx with Humira    SIADH (syndrome of inappropriate ADH production) (HCC)           Past Surgical History[]Expand by Default         Past Surgical History:   Procedure Laterality Date    CERVICAL LAMINECTOMY         C6 laminectomy    HIP ARTHROPLASTY         left hip    SPINAL FUSION         cervico thoracic fusion C2-T4            Family history: Unable to obtain information.  Social History            Tobacco Use    Smoking status: Former       Current packs/day: 0.00       Types: Cigarettes       Quit date: 2022       Years since quittin.1    Smokeless tobacco: Current    Tobacco comments:       Dipping    Substance Use Topics    Alcohol use: Yes       No Known Allergies      Current Facility-Administered Medications             Current Facility-Administered Medications   Medication Dose Route Frequency Provider Last Rate Last Admin    albuterol sulfate HFA (PROVENTIL;VENTOLIN;PROAIR) 108 (90 Base) MCG/ACT inhaler 2 puff  2 puff Inhalation Q4H PRN Jed Call MD        thiamine tablet 100 mg  100 mg Oral Daily Jed Call MD        tiotropium (SPIRIVA RESPIMAT) 2.5 MCG/ACT  % 0 - 1 % 1  2/9/24 03:18   Neutrophils Absolute 1.8 - 8.0 K/UL 4.0  2/9/24 03:18   Lymphocytes Absolute 0.8 - 3.5 K/UL 0.6 (L)  2/9/24 03:18   Monocytes Absolute 0.0 - 1.0 K/UL 0.9  2/9/24 03:18   Eosinophils Absolute 0.0 - 0.4 K/UL 0.0  2/9/24 03:18   Basophils Absolute 0.0 - 0.1 K/UL 0.0  2/9/24 03:18   Differential Type -   AUTOMATED  2/9/24 03:18   Granulocyte Absolute Count 0.00 - 0.04 K/UL 0.0  4/3/22 13:45   Immature Granulocytes 0 - 0.5 % 1 (H)  2/9/24 03:18   Nucleated Red Blood Cells 0.0  WBC  0.00 - 0.01 K/uL 0.0  2/9/24 03:18  0.00  2/9/24 03:18   NRBC Absolute 0.00 - 0.01 K/uL 0.00  4/4/22 06:55   Absolute Immature Granulocyte 0.00 - 0.04 K/UL 0.0  2/9/24 03:18   Ferritin 26 - 388 ng/mL 162  2/9/24 07:24   Iron 35 - 150 ug/dL 28 (L)  2/9/24 07:24   TIBC 250 - 450 ug/dL 216 (L)  2/9/24 07:24   Iron % Saturation 20 - 50 % 13 (L)  2/9/24 07:24   CULTURE, BLOOD 1  Rpt  2/9/24 03:25   (LL     A/P    Hyponatremia  Likely due to the chronic alcohol use may have advanced liver disease, with over platelets of 120k  Ast/alt ratio above 2 consistent ed with ETOH abuse actively    Dysphagia oropharyngeal:   weight loss.   Hx of ETOH and tobacco use      Anemia: Hemoglobin dropped to 9., iron deficiency     RA with  Ankylosing spondylitis spine     Gi plan    Continue COVID treatment if needed  ETOH protocol  Iron placement'  Discussed with patient, may need urgent endoscopy studies, tEgd is needed once COVID treated  Patient may go back with his primary gastroenterologist, or call my office to make appointment for the endoscopy.

## 2024-02-09 NOTE — CONSULTS
Pulmonology and Critical Care Consult    Subjective:   Consult Note: 2/9/2024 3:33 PM    Chief Complaint:   Chief Complaint   Patient presents with    Shortness of Breath        This patient has been seen and evaluated at the request of Dr. Potter.     Patient is a 53-year-old  male with a history of nicotine dependence, COPD, EtOH abuse, hypertension, chronic hyponatremia, and rheumatoid arthritis who presented to the hospital with myalgias, chills, and dysphagia and was found to test positive for COVID-19.  Patient seen and examined in his room in the emergency department this afternoon, states that over the past 24 hours she has had some dysphagia, myalgias, and chills which brought him into the hospital where he tested positive for COVID-19, negative for influenza.  Platelets 139, no leukocytosis, sodium level improving to 123 from 118 on normal saline 75 cc/h.  Renal function stable, potassium level 3.4, TSH normal, lactate normal, CK level normal, vitamin D level low at 13.8.  I will give 40 mEq oral KCl and start the patient on weekly ergocalciferol for vitamin D deficiency.  Troponin level 357 on admission and has slowly trended up to 623, continue to trend until coming back down again.  Chest x-ray initially on admission demonstrated clear lungs bilaterally and a repeat chest x-ray later today showed a left lower lobe infiltrate.  I will send off an expanded infectious workup, but the patient is currently on IV Zosyn/vancomycin.  With testing positive for COVID-19, I will check inflammatory markers and start the patient on Decadron 6 mg IV daily, but patient does not qualify for any more aggressive COVID-19 therapies.  Nephrology consulted for hyponatremia; GI consulted for dysphagia, and cardiology consulted for elevated troponin.  Patient is currently on symptom triggered IV Ativan per CIWA protocol.  TTE is currently pending.      Past Medical History:   Diagnosis Date    Ankylosing spondylitis  and gums normal   Neck:   Supple, symmetrical, trachea midline, no adenopathy;        thyroid:  No enlargement/tenderness/nodules; no carotid    bruit or JVD   Back:     Symmetric, no curvature, ROM normal, no CVA tenderness   Lungs:   Prolonged exhalation phase without significant wheezing respirations unlabored   Chest wall:    No tenderness or deformity   Heart:    Regular rate and rhythm, S1 and S2 normal, no murmur, rub   or gallop   Abdomen:     Soft, non-tender, bowel sounds active all four quadrants,     no masses, no organomegaly   Genitalia:    Normal male without lesion, discharge or tenderness   Rectal:    Normal tone, normal prostate, no masses or tenderness;    guaiac negative stool   Extremities:   Extremities normal, atraumatic, no cyanosis or edema   Pulses:   2+ and symmetric all extremities   Skin:   Skin color, texture, turgor normal, no rashes or lesions   Lymph nodes:   Cervical, supraclavicular, and axillary nodes normal   Neurologic:   CNII-XII intact. Normal strength, sensation and reflexes       throughout       Additional comments:None    Lab/Data Review:  Recent Results (from the past 24 hour(s))   EKG 12 Lead    Collection Time: 02/09/24  3:03 AM   Result Value Ref Range    Ventricular Rate 123 BPM    Atrial Rate 127 BPM    QRS Duration 74 ms    Q-T Interval 304 ms    QTc Calculation (Bazett) 435 ms    R Axis 1 degrees    T Axis 5 degrees    Diagnosis       Artifacts present  Undetermined rhythm  Septal infarct , age undetermined  Abnormal ECG  When compared with ECG of 03-APR-2022 13:30,  Junctional rhythm has replaced Sinus rhythm  Vent. rate has increased BY  42 BPM  Septal infarct is now Present  ST now depressed in Anterior leads  Nonspecific T wave abnormality now evident in Anterior leads  Nonspecific T wave abnormality no longer evident in Lateral leads  Confirmed by JOSE FRANCISCO LAM ([id number]) on 2/9/2024 12:36:59 PM     Procalcitonin    Collection Time: 02/09/24  3:17 AM   Result

## 2024-02-09 NOTE — H&P
Hospitalist History & Physical Notes.           [facility].              Name : Oumar Merida III      MRN number : [id number]     YOB: 1970     Subjective :   Chief Complaint : Increased shortness of breath with generalized bodyaches    Source of information : Patient not a great historian.  Reviewed previous medical records.  Discussed with the emergency room physician.    History of present illness:   Oumar Merida III is  53 y.o. male with a history of alcohol abuse, COPD, hypertension presents to the emergency room complaining of increased shortness of breath from his baseline.  Feels very restless, unable to relax and sleep.  Complains of generalized bodyaches and feeling warm.  Admits upper respiratory symptoms, has grandchild who is sick..  States he is decreased alcohol intake compared to previous week and continues to smoke.    Also complains of dysphagia oropharyngeal for 1 year for solids.  He is able to take full liquid diet and lost weight almost 100 pounds.  He was made an appointment with gastroenterology for yesterday but he has to reschedule it due to his sickness.    His test came back positive for COVID-19.  Also severe hyponatremia with sodium of 118.  Troponin elevated with no previous cardiac history.     He is on immunosuppressive medication Humira every 2 weeks and following with rheumatology.    Past Medical History:   Diagnosis Date    COPD (chronic obstructive pulmonary disease) (HCC)     Hypertension     Rheumatoid arthritis (HCC)     Rx with Humira    SIADH (syndrome of inappropriate ADH production) ([facility])      Past Surgical History:   Procedure Laterality Date    CERVICAL LAMINECTOMY      C6 laminectomy    HIP ARTHROPLASTY      left hip    SPINAL FUSION      cervico thoracic fusion C2-T4       Family history: Unable to obtain information.  Social History     Tobacco Use    Smoking status: Former     Current packs/day: 0.00     Types: Cigarettes     will repeat again at 1130, ordered for an echocardiogram..    Dysphagia oropharyngeal: He is on full liquid diet and weight loss.  Will request gastroenterology consultation.    Anemia: Hemoglobin dropped to 9.7, last 1 to compare was 2022 with normal hemoglobin.  Will monitor closely as it may drop with hydration, with next lab work we will get this type and screen.  Will order some basic lab works of anemia.    Essential hypertension: But off of all the medications due to controlled blood pressure medication since weight loss.    Chronic obstructive pulmonary disease: On Breo and Symbicort which we will continue.  No signs of exacerbation at this time.    Ankylosing spondylitis spine    Admitted to medical telemetry      Medications Home :    Reviewed with external Rx     Code status : Full code    VTE prophylaxis :  Enoxaparin     Advance Medical directive : Health care decision maker information is on file.         Discussion/MDM:   I have discussed patient's presentation/findings and clinical course to date with ED provider.     Given the patient's current clinical presentation, I have a high level of concern for decompensation if discharged from the emergency department as patient has multiple medical comorbidities with increased risk of morbidity and mortality  that warrants admission to hospital.     I have reviewed patient's presenting subjective and objective findings, as well as all laboratory studies, imaging studies, and vital signs to date as well as treatment rendered and patient's response to those treatments.  In addition, prior medical, surgical and relevant social and family histories were reviewed.      CC : Rayshawn Bloom, APRN - NP  Signed By: Jed Call MD     February 9, 2024      This dictation was done by dragon, computer voice recognition software.  Often unanticipated grammatical, syntax, [place] phones and other interpretive errors are inadvertently transcribed.  Please excuse

## 2024-02-09 NOTE — ED PROVIDER NOTES
[facility]  EMERGENCY DEPARTMENT ENCOUNTER NOTE    Date: 2/9/2024  Patient Name: Oumar Merida III    History of Presenting Illness     Chief Complaint   Patient presents with    Shortness of Breath     History obtained from: Patient    HPI: Oumar Merida III, 53 y.o. male with past medical history as listed and reviewed below presents for shortness of breath.  For the past day, has been having fevers, chills, cough, and shortness of breath with wheezing.  He has had multiple contacts with individuals with URI symptoms.  He denies any chest pain.  No abdominal pain, nausea, or vomiting.  Over the past year, has been having progressively worsening dysphagia, an 80 pound weight loss, and a slow increase in swelling in the submental area/anterior lateral neck.  He has followed up with ENT and was referred to GI but has not gotten his endoscopy yet.  He is able to tolerate soft foods and liquids but not hard foods.  He has having intermittent aspirations as well.  He has a history of SIADH but has not been on desmopressin.  The patient is a chronic smoker and heavy alcohol user but has been cutting down to 5 drinks.    Medical History   I reviewed the medical, surgical, family, and social history, as well as allergies:    PCP: Rayshawn Bloom APRN - NP    Past Medical History:  Past Medical History:   Diagnosis Date    COPD (chronic obstructive pulmonary disease) ([facility])     Hypertension     SIADH (syndrome of inappropriate ADH production) ([facility])      Past Surgical History:  History reviewed. No pertinent surgical history.  Current Outpatient Medications:  Current Outpatient Medications   Medication Instructions    acetaminophen (TYLENOL) 650 mg, Oral, EVERY 4 HOURS PRN    albuterol sulfate HFA (PROVENTIL;VENTOLIN;PROAIR) 108 (90 Base) MCG/ACT inhaler 2 puffs, Inhalation, EVERY 4 HOURS PRN    carvedilol (COREG) 6.25 mg, Oral, 2 TIMES DAILY    fluticasone-umeclidin-vilant  signs of trauma  NEURO:  * Speech clear  * Moves U&LE to command    Medical Decision Making     Patient is a 53 y.o. male presenting for SOB. Vitals reveal  fever and tachycardia  and physical exam reveals  wheezing and neck mass . EKG showed  sinus tach . Based on the history, physical exam, risk factors, and vital signs, differential includes: ACS, pneumonia, COPD exacerbation, COVID, flu, ACS, sepsis, malignancy.    See ED Course and Reassessment for evaluation and discussion.        Records Reviewed: Nursing Notes and Old Medical Records  Social Determinants of health affecting management: None    ED Course & Reassessment     ED Course:     ED Course as of 02/09/24 0542   Fri Feb 09, 2024   0326 Lactate elevated. Septic shock. [SS]   0413 Chest x-ray negative for acute pathology: no CXR evidence of pulmonary edema, pleural effusion, pneumothorax, or pneumonia. This study was interpreted by me and confirmed on report.   [SS]   0413 Sodium is 119, consistent with history of SIADH. [SS]   0444 Influenza swab negative.   [SS]   0449 COVID positive. [SS]   0508 Noted Hyponatremia. No other significant electrolyte derangements. Creatinine is not elevated more than baseline range making MARYAM unlikely. No significant transaminitis noted. Mild bilirubin elevation. [SS]   0530 CBC does not show any evidence of acute process. Leukocytosis not present to suggest infection. Hemoglobin not suggestive of acute anemia.    [SS]   0533 I offered the admission but he prefers not to be admitted. Pending repeat lactate and trop. [SS]   0538 Troponin elevated.  Patient is agreeable with being admitted for cardiology consultation. [SS]      ED Course User Index  [SS] Art Gotti MD       Reassessment:    Will admit.    Diagnosis     Clinical Impression:   1. NSTEMI (non-ST elevated myocardial infarction) ([facility])    2. SIADH (syndrome of inappropriate ADH production) ([facility])    3. Esophageal dysphagia    4. Hyponatremia    5. Septicemia

## 2024-02-10 LAB
FERRITIN SERPL-MCNC: 176 NG/ML (ref 26–388)
M PNEUMO IGM SER IA-ACNC: NONREACTIVE

## 2024-02-10 NOTE — ED NOTES
Patient sitting at the side of the bed, stating he needed to leave.  Attempted to call the physician to notify about the patient wanting to leave AMA, but the patient continuing to get out of the bed, stating he urgently needed to leave.      Spoke with patient regarding him wanting to leave against medical advise.  Educated him on the risks of leaving and not getting the treatment he needed.  Patient states he understands.  Also told patient to come back once taking care of what he needed to to continue and finish his treatment.  Patient states he understands and will come back.

## 2024-02-10 NOTE — ED NOTES
2000 -     Patient's belongings found in the room after the patient left.  Called patient, and he states he will be here sometime tomorrow morning to get his belongings.  Charge RN notified.  Belongings put into a patient belonging bag with patient label on it and put at the charge station.

## 2024-02-10 NOTE — ED NOTES
Patient sitting up at the side of the bed, patient states, \"I want to go home and take care of business\" . Patient attempting to pull off I.V's. Educated patient on current plan of care and the risks of leaving against medical advice. Patient states, \"I do not care, I will follow up with my doctor on Monday, I know I am sick but I want to leave now\". Physician notified, patient signed documentation. Patient states, \"I will wait outside a friend is coming to pick me up\". Patient left through exit on foot with personal belongings. All I.V's removed.

## 2024-02-10 NOTE — ED NOTES
Troponin elevated at 1040, Hoa perfectserved as well as hospitalist covering. Hoa provided orders to begin heparin drip and to enter protocol.

## 2024-02-11 LAB
BACTERIA SPEC CULT: NORMAL
BACTERIA SPEC CULT: NORMAL
Lab: NORMAL
Lab: NORMAL

## 2024-02-15 LAB
BACTERIA SPEC CULT: NORMAL
BACTERIA SPEC CULT: NORMAL
Lab: NORMAL
Lab: NORMAL

## 2024-03-12 ENCOUNTER — HOSPITAL ENCOUNTER (INPATIENT)
Facility: HOSPITAL | Age: 54
LOS: 9 days | Discharge: HOME | DRG: 426 | End: 2024-03-21
Attending: STUDENT IN AN ORGANIZED HEALTH CARE EDUCATION/TRAINING PROGRAM | Admitting: STUDENT IN AN ORGANIZED HEALTH CARE EDUCATION/TRAINING PROGRAM
Payer: COMMERCIAL

## 2024-03-12 ENCOUNTER — APPOINTMENT (OUTPATIENT)
Facility: HOSPITAL | Age: 54
DRG: 426 | End: 2024-03-12
Payer: COMMERCIAL

## 2024-03-12 DIAGNOSIS — E87.1 HYPONATREMIA: Primary | ICD-10-CM

## 2024-03-12 DIAGNOSIS — R41.82 ALTERED MENTAL STATUS, UNSPECIFIED ALTERED MENTAL STATUS TYPE: ICD-10-CM

## 2024-03-12 LAB
ALBUMIN SERPL-MCNC: 2.4 G/DL (ref 3.5–5)
ALBUMIN SERPL-MCNC: 2.4 G/DL (ref 3.5–5)
ALBUMIN/GLOB SERPL: 0.6 (ref 1.1–2.2)
ALP SERPL-CCNC: 132 U/L (ref 45–117)
ALT SERPL-CCNC: 20 U/L (ref 12–78)
AMMONIA PLAS-SCNC: 42 UMOL/L
ANION GAP BLD CALC-SCNC: 8
ANION GAP SERPL CALC-SCNC: 10 MMOL/L (ref 5–15)
ANION GAP SERPL CALC-SCNC: 6 MMOL/L (ref 5–15)
ANION GAP SERPL CALC-SCNC: 6 MMOL/L (ref 5–15)
ANION GAP SERPL CALC-SCNC: 7 MMOL/L (ref 5–15)
APPEARANCE UR: CLEAR
AST SERPL W P-5'-P-CCNC: 46 U/L (ref 15–37)
BACTERIA URNS QL MICRO: NEGATIVE /HPF
BASOPHILS # BLD: 0.1 K/UL (ref 0–0.1)
BASOPHILS NFR BLD: 0 % (ref 0–1)
BILIRUB SERPL-MCNC: 1.4 MG/DL (ref 0.2–1)
BILIRUB UR QL: NEGATIVE
BUN SERPL-MCNC: 5 MG/DL (ref 6–20)
BUN SERPL-MCNC: 6 MG/DL (ref 6–20)
BUN/CREAT SERPL: 20 (ref 12–20)
BUN/CREAT SERPL: 20 (ref 12–20)
BUN/CREAT SERPL: 22 (ref 12–20)
BUN/CREAT SERPL: 29 (ref 12–20)
CA-I BLD-MCNC: 0.92 MMOL/L (ref 1.12–1.32)
CA-I BLD-MCNC: 7.6 MG/DL (ref 8.5–10.1)
CA-I BLD-MCNC: 7.9 MG/DL (ref 8.5–10.1)
CA-I BLD-MCNC: 8 MG/DL (ref 8.5–10.1)
CA-I BLD-MCNC: 8 MG/DL (ref 8.5–10.1)
CHLORIDE BLD-SCNC: 73 MMOL/L (ref 98–107)
CHLORIDE SERPL-SCNC: 73 MMOL/L (ref 97–108)
CHLORIDE SERPL-SCNC: 81 MMOL/L (ref 97–108)
CHLORIDE SERPL-SCNC: 87 MMOL/L (ref 97–108)
CHLORIDE SERPL-SCNC: 87 MMOL/L (ref 97–108)
CO2 BLD-SCNC: 27 MMOL/L
CO2 SERPL-SCNC: 25 MMOL/L (ref 21–32)
CO2 SERPL-SCNC: 27 MMOL/L (ref 21–32)
COLOR UR: ABNORMAL
CREAT SERPL-MCNC: 0.17 MG/DL (ref 0.7–1.3)
CREAT SERPL-MCNC: 0.23 MG/DL (ref 0.7–1.3)
CREAT SERPL-MCNC: 0.25 MG/DL (ref 0.7–1.3)
CREAT SERPL-MCNC: 0.3 MG/DL (ref 0.7–1.3)
CREAT UR-MCNC: 0.35 MG/DL (ref 0.6–1.3)
DIFFERENTIAL METHOD BLD: ABNORMAL
EOSINOPHIL # BLD: 0.1 K/UL (ref 0–0.4)
EOSINOPHIL NFR BLD: 1 % (ref 0–7)
EPITH CASTS URNS QL MICRO: ABNORMAL /LPF
ERYTHROCYTE [DISTWIDTH] IN BLOOD BY AUTOMATED COUNT: 16.6 % (ref 11.5–14.5)
ETHANOL SERPL-MCNC: <10 MG/DL (ref 0–0.08)
GLOBULIN SER CALC-MCNC: 4.2 G/DL (ref 2–4)
GLUCOSE BLD STRIP.AUTO-MCNC: 84 MG/DL (ref 65–100)
GLUCOSE SERPL-MCNC: 142 MG/DL (ref 65–100)
GLUCOSE SERPL-MCNC: 78 MG/DL (ref 65–100)
GLUCOSE SERPL-MCNC: 78 MG/DL (ref 65–100)
GLUCOSE SERPL-MCNC: 81 MG/DL (ref 65–100)
GLUCOSE UR STRIP.AUTO-MCNC: NEGATIVE MG/DL
HCT VFR BLD AUTO: 29.7 % (ref 36.6–50.3)
HGB BLD-MCNC: 10.9 G/DL (ref 12.1–17)
HGB UR QL STRIP: NEGATIVE
IMM GRANULOCYTES # BLD AUTO: 0.1 K/UL (ref 0–0.04)
IMM GRANULOCYTES NFR BLD AUTO: 1 % (ref 0–0.5)
INR PPP: 1 (ref 0.9–1.1)
KETONES UR QL STRIP.AUTO: 20 MG/DL
LEUKOCYTE ESTERASE UR QL STRIP.AUTO: ABNORMAL
LYMPHOCYTES # BLD: 1.1 K/UL (ref 0.8–3.5)
LYMPHOCYTES NFR BLD: 8 % (ref 12–49)
MCH RBC QN AUTO: 32 PG (ref 26–34)
MCHC RBC AUTO-ENTMCNC: 36.7 G/DL (ref 30–36.5)
MCV RBC AUTO: 87.1 FL (ref 80–99)
MONOCYTES # BLD: 1 K/UL (ref 0–1)
MONOCYTES NFR BLD: 7 % (ref 5–13)
NEUTS SEG # BLD: 12.2 K/UL (ref 1.8–8)
NEUTS SEG NFR BLD: 83 % (ref 32–75)
NITRITE UR QL STRIP.AUTO: NEGATIVE
NRBC # BLD: 0 K/UL (ref 0–0.01)
NRBC BLD-RTO: 0 PER 100 WBC
OSMOLALITY SERPL: 222 MOSM/KG H2O
OSMOLALITY UR: 176 MOSM/KG H2O
PH UR STRIP: 6 (ref 5–8)
PHOSPHATE SERPL-MCNC: 3.2 MG/DL (ref 2.6–4.7)
PLATELET # BLD AUTO: 230 K/UL (ref 150–400)
PMV BLD AUTO: 9.1 FL (ref 8.9–12.9)
POTASSIUM BLD-SCNC: 5.7 MMOL/L (ref 3.5–5.5)
POTASSIUM SERPL-SCNC: 4 MMOL/L (ref 3.5–5.1)
POTASSIUM SERPL-SCNC: 4.1 MMOL/L (ref 3.5–5.1)
POTASSIUM SERPL-SCNC: 4.1 MMOL/L (ref 3.5–5.1)
POTASSIUM SERPL-SCNC: 5 MMOL/L (ref 3.5–5.1)
PROT SERPL-MCNC: 6.6 G/DL (ref 6.4–8.2)
PROT UR STRIP-MCNC: NEGATIVE MG/DL
PROTHROMBIN TIME: 13.7 SEC (ref 11.9–14.6)
RBC # BLD AUTO: 3.41 M/UL (ref 4.1–5.7)
RBC #/AREA URNS HPF: ABNORMAL /HPF (ref 0–5)
SODIUM BLD-SCNC: 106 MMOL/L (ref 136–145)
SODIUM SERPL-SCNC: 108 MMOL/L (ref 136–145)
SODIUM SERPL-SCNC: 115 MMOL/L (ref 136–145)
SODIUM SERPL-SCNC: 120 MMOL/L (ref 136–145)
SODIUM SERPL-SCNC: 120 MMOL/L (ref 136–145)
SODIUM UR-SCNC: 7 MMOL/L
SP GR UR REFRACTOMETRY: 1.01 (ref 1–1.03)
TSH SERPL DL<=0.05 MIU/L-ACNC: 1.61 UIU/ML (ref 0.36–3.74)
URINE CULTURE IF INDICATED: ABNORMAL
UROBILINOGEN UR QL STRIP.AUTO: 0.1 EU/DL (ref 0.1–1)
WBC # BLD AUTO: 14.5 K/UL (ref 4.1–11.1)
WBC URNS QL MICRO: ABNORMAL /HPF (ref 0–4)

## 2024-03-12 PROCEDURE — 81001 URINALYSIS AUTO W/SCOPE: CPT

## 2024-03-12 PROCEDURE — 80048 BASIC METABOLIC PNL TOTAL CA: CPT

## 2024-03-12 PROCEDURE — HZ2ZZZZ DETOXIFICATION SERVICES FOR SUBSTANCE ABUSE TREATMENT: ICD-10-PCS | Performed by: INTERNAL MEDICINE

## 2024-03-12 PROCEDURE — 6360000002 HC RX W HCPCS: Performed by: STUDENT IN AN ORGANIZED HEALTH CARE EDUCATION/TRAINING PROGRAM

## 2024-03-12 PROCEDURE — 93005 ELECTROCARDIOGRAM TRACING: CPT | Performed by: STUDENT IN AN ORGANIZED HEALTH CARE EDUCATION/TRAINING PROGRAM

## 2024-03-12 PROCEDURE — 94640 AIRWAY INHALATION TREATMENT: CPT

## 2024-03-12 PROCEDURE — 82746 ASSAY OF FOLIC ACID SERUM: CPT

## 2024-03-12 PROCEDURE — 99285 EMERGENCY DEPT VISIT HI MDM: CPT

## 2024-03-12 PROCEDURE — 84300 ASSAY OF URINE SODIUM: CPT

## 2024-03-12 PROCEDURE — 2000000000 HC ICU R&B

## 2024-03-12 PROCEDURE — 6360000002 HC RX W HCPCS: Performed by: INTERNAL MEDICINE

## 2024-03-12 PROCEDURE — 2580000003 HC RX 258: Performed by: STUDENT IN AN ORGANIZED HEALTH CARE EDUCATION/TRAINING PROGRAM

## 2024-03-12 PROCEDURE — 96374 THER/PROPH/DIAG INJ IV PUSH: CPT

## 2024-03-12 PROCEDURE — 6370000000 HC RX 637 (ALT 250 FOR IP): Performed by: STUDENT IN AN ORGANIZED HEALTH CARE EDUCATION/TRAINING PROGRAM

## 2024-03-12 PROCEDURE — 2580000003 HC RX 258: Performed by: INTERNAL MEDICINE

## 2024-03-12 PROCEDURE — 80053 COMPREHEN METABOLIC PANEL: CPT

## 2024-03-12 PROCEDURE — 80047 BASIC METABLC PNL IONIZED CA: CPT

## 2024-03-12 PROCEDURE — 36415 COLL VENOUS BLD VENIPUNCTURE: CPT

## 2024-03-12 PROCEDURE — 82140 ASSAY OF AMMONIA: CPT

## 2024-03-12 PROCEDURE — 80069 RENAL FUNCTION PANEL: CPT

## 2024-03-12 PROCEDURE — 83930 ASSAY OF BLOOD OSMOLALITY: CPT

## 2024-03-12 PROCEDURE — 82077 ASSAY SPEC XCP UR&BREATH IA: CPT

## 2024-03-12 PROCEDURE — 85610 PROTHROMBIN TIME: CPT

## 2024-03-12 PROCEDURE — 6370000000 HC RX 637 (ALT 250 FOR IP): Performed by: INTERNAL MEDICINE

## 2024-03-12 PROCEDURE — 83935 ASSAY OF URINE OSMOLALITY: CPT

## 2024-03-12 PROCEDURE — 85025 COMPLETE CBC W/AUTO DIFF WBC: CPT

## 2024-03-12 PROCEDURE — 70450 CT HEAD/BRAIN W/O DYE: CPT

## 2024-03-12 PROCEDURE — 84443 ASSAY THYROID STIM HORMONE: CPT

## 2024-03-12 RX ORDER — POTASSIUM CHLORIDE 7.45 MG/ML
10 INJECTION INTRAVENOUS PRN
Status: DISCONTINUED | OUTPATIENT
Start: 2024-03-12 | End: 2024-03-21 | Stop reason: HOSPADM

## 2024-03-12 RX ORDER — IPRATROPIUM BROMIDE AND ALBUTEROL SULFATE 2.5; .5 MG/3ML; MG/3ML
1 SOLUTION RESPIRATORY (INHALATION)
Status: DISCONTINUED | OUTPATIENT
Start: 2024-03-12 | End: 2024-03-13

## 2024-03-12 RX ORDER — LORAZEPAM 2 MG/ML
1 INJECTION INTRAMUSCULAR
Status: DISCONTINUED | OUTPATIENT
Start: 2024-03-12 | End: 2024-03-21 | Stop reason: HOSPADM

## 2024-03-12 RX ORDER — SODIUM CHLORIDE 0.9 % (FLUSH) 0.9 %
5-40 SYRINGE (ML) INJECTION PRN
Status: DISCONTINUED | OUTPATIENT
Start: 2024-03-12 | End: 2024-03-21 | Stop reason: HOSPADM

## 2024-03-12 RX ORDER — LORAZEPAM 1 MG/1
3 TABLET ORAL
Status: DISCONTINUED | OUTPATIENT
Start: 2024-03-12 | End: 2024-03-21 | Stop reason: HOSPADM

## 2024-03-12 RX ORDER — LORAZEPAM 2 MG/ML
3 INJECTION INTRAMUSCULAR
Status: DISCONTINUED | OUTPATIENT
Start: 2024-03-12 | End: 2024-03-21 | Stop reason: HOSPADM

## 2024-03-12 RX ORDER — THIAMINE HYDROCHLORIDE 100 MG/ML
200 INJECTION, SOLUTION INTRAMUSCULAR; INTRAVENOUS ONCE
Status: COMPLETED | OUTPATIENT
Start: 2024-03-12 | End: 2024-03-12

## 2024-03-12 RX ORDER — LORAZEPAM 1 MG/1
1 TABLET ORAL
Status: DISCONTINUED | OUTPATIENT
Start: 2024-03-12 | End: 2024-03-21 | Stop reason: HOSPADM

## 2024-03-12 RX ORDER — BUDESONIDE AND FORMOTEROL FUMARATE DIHYDRATE 80; 4.5 UG/1; UG/1
2 AEROSOL RESPIRATORY (INHALATION)
Status: DISCONTINUED | OUTPATIENT
Start: 2024-03-12 | End: 2024-03-21 | Stop reason: HOSPADM

## 2024-03-12 RX ORDER — NICOTINE 21 MG/24HR
1 PATCH, TRANSDERMAL 24 HOURS TRANSDERMAL DAILY
Status: DISCONTINUED | OUTPATIENT
Start: 2024-03-12 | End: 2024-03-21 | Stop reason: HOSPADM

## 2024-03-12 RX ORDER — LORAZEPAM 1 MG/1
2 TABLET ORAL
Status: DISCONTINUED | OUTPATIENT
Start: 2024-03-12 | End: 2024-03-21 | Stop reason: HOSPADM

## 2024-03-12 RX ORDER — SODIUM CHLORIDE 9 MG/ML
INJECTION, SOLUTION INTRAVENOUS PRN
Status: DISCONTINUED | OUTPATIENT
Start: 2024-03-12 | End: 2024-03-21 | Stop reason: HOSPADM

## 2024-03-12 RX ORDER — 3% SODIUM CHLORIDE 3 G/100ML
150 INJECTION, SOLUTION INTRAVENOUS ONCE
Status: DISCONTINUED | OUTPATIENT
Start: 2024-03-12 | End: 2024-03-12

## 2024-03-12 RX ORDER — ALBUTEROL SULFATE 90 UG/1
2 AEROSOL, METERED RESPIRATORY (INHALATION) EVERY 4 HOURS PRN
Status: DISCONTINUED | OUTPATIENT
Start: 2024-03-12 | End: 2024-03-21 | Stop reason: HOSPADM

## 2024-03-12 RX ORDER — SODIUM CHLORIDE 0.9 % (FLUSH) 0.9 %
5-40 SYRINGE (ML) INJECTION EVERY 12 HOURS SCHEDULED
Status: DISCONTINUED | OUTPATIENT
Start: 2024-03-12 | End: 2024-03-21 | Stop reason: HOSPADM

## 2024-03-12 RX ORDER — MULTIVITAMIN WITH IRON
1 TABLET ORAL DAILY
Status: DISCONTINUED | OUTPATIENT
Start: 2024-03-12 | End: 2024-03-21 | Stop reason: HOSPADM

## 2024-03-12 RX ORDER — ACETAMINOPHEN 650 MG/1
650 SUPPOSITORY RECTAL EVERY 6 HOURS PRN
Status: DISCONTINUED | OUTPATIENT
Start: 2024-03-12 | End: 2024-03-21 | Stop reason: HOSPADM

## 2024-03-12 RX ORDER — LORAZEPAM 1 MG/1
4 TABLET ORAL
Status: DISCONTINUED | OUTPATIENT
Start: 2024-03-12 | End: 2024-03-21 | Stop reason: HOSPADM

## 2024-03-12 RX ORDER — ONDANSETRON 2 MG/ML
4 INJECTION INTRAMUSCULAR; INTRAVENOUS EVERY 6 HOURS PRN
Status: DISCONTINUED | OUTPATIENT
Start: 2024-03-12 | End: 2024-03-21 | Stop reason: HOSPADM

## 2024-03-12 RX ORDER — DEXTROSE MONOHYDRATE 50 MG/ML
INJECTION, SOLUTION INTRAVENOUS CONTINUOUS
Status: DISCONTINUED | OUTPATIENT
Start: 2024-03-12 | End: 2024-03-13

## 2024-03-12 RX ORDER — ACETAMINOPHEN 325 MG/1
650 TABLET ORAL EVERY 6 HOURS PRN
Status: DISCONTINUED | OUTPATIENT
Start: 2024-03-12 | End: 2024-03-21 | Stop reason: HOSPADM

## 2024-03-12 RX ORDER — ENOXAPARIN SODIUM 100 MG/ML
40 INJECTION SUBCUTANEOUS DAILY
Status: DISCONTINUED | OUTPATIENT
Start: 2024-03-12 | End: 2024-03-15 | Stop reason: SDUPTHER

## 2024-03-12 RX ORDER — GAUZE BANDAGE 2" X 2"
100 BANDAGE TOPICAL DAILY
Status: DISCONTINUED | OUTPATIENT
Start: 2024-03-13 | End: 2024-03-21 | Stop reason: HOSPADM

## 2024-03-12 RX ORDER — POLYETHYLENE GLYCOL 3350 17 G/17G
17 POWDER, FOR SOLUTION ORAL DAILY PRN
Status: DISCONTINUED | OUTPATIENT
Start: 2024-03-12 | End: 2024-03-21 | Stop reason: HOSPADM

## 2024-03-12 RX ORDER — LORAZEPAM 2 MG/ML
2 INJECTION INTRAMUSCULAR
Status: DISCONTINUED | OUTPATIENT
Start: 2024-03-12 | End: 2024-03-21 | Stop reason: HOSPADM

## 2024-03-12 RX ORDER — GAUZE BANDAGE 2" X 2"
100 BANDAGE TOPICAL DAILY
Status: DISCONTINUED | OUTPATIENT
Start: 2024-03-12 | End: 2024-03-12 | Stop reason: SDUPTHER

## 2024-03-12 RX ORDER — 3% SODIUM CHLORIDE 3 G/100ML
60 INJECTION, SOLUTION INTRAVENOUS CONTINUOUS
Status: DISCONTINUED | OUTPATIENT
Start: 2024-03-12 | End: 2024-03-12

## 2024-03-12 RX ORDER — POTASSIUM CHLORIDE 29.8 MG/ML
20 INJECTION INTRAVENOUS PRN
Status: DISCONTINUED | OUTPATIENT
Start: 2024-03-12 | End: 2024-03-21 | Stop reason: HOSPADM

## 2024-03-12 RX ORDER — MAGNESIUM SULFATE IN WATER 40 MG/ML
2000 INJECTION, SOLUTION INTRAVENOUS PRN
Status: DISCONTINUED | OUTPATIENT
Start: 2024-03-12 | End: 2024-03-21 | Stop reason: HOSPADM

## 2024-03-12 RX ORDER — ONDANSETRON 4 MG/1
4 TABLET, ORALLY DISINTEGRATING ORAL EVERY 8 HOURS PRN
Status: DISCONTINUED | OUTPATIENT
Start: 2024-03-12 | End: 2024-03-21 | Stop reason: HOSPADM

## 2024-03-12 RX ORDER — LORAZEPAM 2 MG/ML
4 INJECTION INTRAMUSCULAR
Status: DISCONTINUED | OUTPATIENT
Start: 2024-03-12 | End: 2024-03-21 | Stop reason: HOSPADM

## 2024-03-12 RX ADMIN — ENOXAPARIN SODIUM 40 MG: 100 INJECTION SUBCUTANEOUS at 19:13

## 2024-03-12 RX ADMIN — IPRATROPIUM BROMIDE AND ALBUTEROL SULFATE 1 DOSE: .5; 2.5 SOLUTION RESPIRATORY (INHALATION) at 20:06

## 2024-03-12 RX ADMIN — SODIUM CHLORIDE, PRESERVATIVE FREE 10 ML: 5 INJECTION INTRAVENOUS at 20:28

## 2024-03-12 RX ADMIN — SODIUM CHLORIDE, PRESERVATIVE FREE 10 ML: 5 INJECTION INTRAVENOUS at 20:27

## 2024-03-12 RX ADMIN — DEXTROSE MONOHYDRATE: 50 INJECTION, SOLUTION INTRAVENOUS at 20:20

## 2024-03-12 RX ADMIN — SODIUM CHLORIDE 60 ML/HR: 3 INJECTION, SOLUTION INTRAVENOUS at 15:01

## 2024-03-12 RX ADMIN — THIAMINE HYDROCHLORIDE 200 MG: 100 INJECTION, SOLUTION INTRAMUSCULAR; INTRAVENOUS at 15:01

## 2024-03-12 RX ADMIN — Medication 2 PUFF: at 20:06

## 2024-03-12 NOTE — H&P
Hospital Medicine History & Physical      Date of Admission: 3/12/2024    Date of Service:  3/12/2024    [x]Admitted to Inpatient with expected LOS greater than two midnights due to medical therapy.  []Placed in Observation status.    Chief Admission Complaint: Weakness    Presenting Admission History: This is a 53-year-old male with past medical history of chronic hyponatremia, COPD, obstructive sleep apnea, ankylosing spondylitis, rheumatoid arthritis dysphagia EtOH abuse, nicotine dependence who presented to the emergency department with altered mentation as well as weakness.  Patient usually walks with a cane.  According to mother who takes care of him he has been having worsening weakness.  She also stated his words were not making sense prompting her to bring him to the emergency department.  Patient consumes 2-4 beers a day on regular.  Last drink was this morning.  Patient has had multiple episodes of hyponatremia in the past.    On arrival to the emergency department patient was hemodynamically stable afebrile.  Labs significant for a sodium level of 108 chloride at 73.  Abnormal LFTs.  Leukocytosis.  CT head with no acute intracranial abnormalities.    Assessment/Plan:      #Severe hyponatremia  #Chronic hyponatremia  Differentials including Beerpotomania, SIADH,  Sodium level at 108 on admission  Started on 3% hypertonic solution  Obtain urine studies  Plan to correct 0.5 mEq/h/no more than 10 within 24 hours  Monitor BMP every 4 hours    #Hyperkalemia   EKG with no signs of T wave inversion  Repeat BMP 5  Hold lisinopril  Lokelma  Continue monitor potassium level    #Acute encephalopathy  Likely in the setting of hyponatremia, alcohol use  Avoid benzodiazepines  [facility] protocol    #Hypertension    #COPD-no exacerbation, continue inhaler, DuoNebs as needed and scheduled    # Obstructive sleep apnea    #Alcohol abuse-CIWA protocol    #Nicotine dependence-nicotine patch    #Rheumatoid arthritis ankylosing

## 2024-03-12 NOTE — ED TRIAGE NOTES
Patient arrives via ems from home with his aunt and mother, patient keeps repeating himself, heavy drinker, patient has an problem with esophagus where he can't swallow well

## 2024-03-12 NOTE — ED NOTES
Patient is now awake and alert, talking to mother at bedside. Patient appears to be a/o x 3, able to now state name, , and place. Still confused about situation.

## 2024-03-12 NOTE — ED PROVIDER NOTES
need to be admitted and for the admission diagnosis.           I am the Primary Clinician of Record. Nela Petty MD (electronically signed)    (Please note that parts of this dictation were completed with voice recognition software. Quite often unanticipated grammatical, syntax, homophones, and other interpretive errors are inadvertently transcribed by the computer software. Please disregards these errors. Please excuse any errors that have escaped final proofreading.)     Eduardo Boyd, Nela KAMINSKI MD  03/13/24 1011

## 2024-03-13 LAB
ALBUMIN SERPL-MCNC: 2.2 G/DL (ref 3.5–5)
ALBUMIN/GLOB SERPL: 0.7 (ref 1.1–2.2)
ALP SERPL-CCNC: 123 U/L (ref 45–117)
ALT SERPL-CCNC: 17 U/L (ref 12–78)
ANION GAP SERPL CALC-SCNC: 6 MMOL/L (ref 5–15)
ANION GAP SERPL CALC-SCNC: 6 MMOL/L (ref 5–15)
ANION GAP SERPL CALC-SCNC: 9 MMOL/L (ref 5–15)
AST SERPL W P-5'-P-CCNC: 25 U/L (ref 15–37)
BILIRUB SERPL-MCNC: 1 MG/DL (ref 0.2–1)
BUN SERPL-MCNC: 5 MG/DL (ref 6–20)
BUN/CREAT SERPL: 17 (ref 12–20)
BUN/CREAT SERPL: 23 (ref 12–20)
BUN/CREAT SERPL: 24 (ref 12–20)
CA-I BLD-MCNC: 7.6 MG/DL (ref 8.5–10.1)
CA-I BLD-MCNC: 7.8 MG/DL (ref 8.5–10.1)
CA-I BLD-MCNC: 8.2 MG/DL (ref 8.5–10.1)
CHLORIDE SERPL-SCNC: 83 MMOL/L (ref 97–108)
CHLORIDE SERPL-SCNC: 83 MMOL/L (ref 97–108)
CHLORIDE SERPL-SCNC: 86 MMOL/L (ref 97–108)
CO2 SERPL-SCNC: 27 MMOL/L (ref 21–32)
CO2 SERPL-SCNC: 30 MMOL/L (ref 21–32)
CO2 SERPL-SCNC: 31 MMOL/L (ref 21–32)
CREAT SERPL-MCNC: 0.21 MG/DL (ref 0.7–1.3)
CREAT SERPL-MCNC: 0.22 MG/DL (ref 0.7–1.3)
CREAT SERPL-MCNC: 0.3 MG/DL (ref 0.7–1.3)
ERYTHROCYTE [DISTWIDTH] IN BLOOD BY AUTOMATED COUNT: 16.4 % (ref 11.5–14.5)
GLOBULIN SER CALC-MCNC: 3.1 G/DL (ref 2–4)
GLUCOSE SERPL-MCNC: 64 MG/DL (ref 65–100)
GLUCOSE SERPL-MCNC: 75 MG/DL (ref 65–100)
GLUCOSE SERPL-MCNC: 95 MG/DL (ref 65–100)
HCT VFR BLD AUTO: 29.8 % (ref 36.6–50.3)
HGB BLD-MCNC: 10.2 G/DL (ref 12.1–17)
MCH RBC QN AUTO: 31.7 PG (ref 26–34)
MCHC RBC AUTO-ENTMCNC: 34.2 G/DL (ref 30–36.5)
MCV RBC AUTO: 92.5 FL (ref 80–99)
NRBC # BLD: 0 K/UL (ref 0–0.01)
NRBC BLD-RTO: 0 PER 100 WBC
PHOSPHATE SERPL-MCNC: 3.2 MG/DL (ref 2.6–4.7)
PLATELET # BLD AUTO: 214 K/UL (ref 150–400)
PMV BLD AUTO: 9.1 FL (ref 8.9–12.9)
POTASSIUM SERPL-SCNC: 4 MMOL/L (ref 3.5–5.1)
POTASSIUM SERPL-SCNC: 4.2 MMOL/L (ref 3.5–5.1)
POTASSIUM SERPL-SCNC: 4.2 MMOL/L (ref 3.5–5.1)
PROT SERPL-MCNC: 5.3 G/DL (ref 6.4–8.2)
RBC # BLD AUTO: 3.22 M/UL (ref 4.1–5.7)
SODIUM SERPL-SCNC: 119 MMOL/L (ref 136–145)
SODIUM SERPL-SCNC: 120 MMOL/L (ref 136–145)
SODIUM SERPL-SCNC: 122 MMOL/L (ref 136–145)
WBC # BLD AUTO: 12.7 K/UL (ref 4.1–11.1)

## 2024-03-13 PROCEDURE — 84100 ASSAY OF PHOSPHORUS: CPT

## 2024-03-13 PROCEDURE — 85027 COMPLETE CBC AUTOMATED: CPT

## 2024-03-13 PROCEDURE — 36415 COLL VENOUS BLD VENIPUNCTURE: CPT

## 2024-03-13 PROCEDURE — 2500000003 HC RX 250 WO HCPCS: Performed by: STUDENT IN AN ORGANIZED HEALTH CARE EDUCATION/TRAINING PROGRAM

## 2024-03-13 PROCEDURE — 80048 BASIC METABOLIC PNL TOTAL CA: CPT

## 2024-03-13 PROCEDURE — 94761 N-INVAS EAR/PLS OXIMETRY MLT: CPT

## 2024-03-13 PROCEDURE — 2700000000 HC OXYGEN THERAPY PER DAY

## 2024-03-13 PROCEDURE — 6370000000 HC RX 637 (ALT 250 FOR IP): Performed by: INTERNAL MEDICINE

## 2024-03-13 PROCEDURE — 2580000003 HC RX 258: Performed by: INTERNAL MEDICINE

## 2024-03-13 PROCEDURE — 80053 COMPREHEN METABOLIC PANEL: CPT

## 2024-03-13 PROCEDURE — 6360000002 HC RX W HCPCS: Performed by: INTERNAL MEDICINE

## 2024-03-13 PROCEDURE — 2580000003 HC RX 258: Performed by: STUDENT IN AN ORGANIZED HEALTH CARE EDUCATION/TRAINING PROGRAM

## 2024-03-13 PROCEDURE — 6370000000 HC RX 637 (ALT 250 FOR IP): Performed by: STUDENT IN AN ORGANIZED HEALTH CARE EDUCATION/TRAINING PROGRAM

## 2024-03-13 PROCEDURE — 6360000002 HC RX W HCPCS: Performed by: STUDENT IN AN ORGANIZED HEALTH CARE EDUCATION/TRAINING PROGRAM

## 2024-03-13 PROCEDURE — 2000000000 HC ICU R&B

## 2024-03-13 PROCEDURE — 94640 AIRWAY INHALATION TREATMENT: CPT

## 2024-03-13 RX ORDER — IBUPROFEN 800 MG/1
800 TABLET ORAL EVERY 8 HOURS PRN
Status: ON HOLD | COMMUNITY
End: 2024-03-21 | Stop reason: HOSPADM

## 2024-03-13 RX ORDER — TRAZODONE HYDROCHLORIDE 50 MG/1
50 TABLET ORAL NIGHTLY PRN
COMMUNITY

## 2024-03-13 RX ORDER — SODIUM CHLORIDE 9 MG/ML
INJECTION, SOLUTION INTRAVENOUS CONTINUOUS
Status: DISCONTINUED | OUTPATIENT
Start: 2024-03-13 | End: 2024-03-13

## 2024-03-13 RX ORDER — LISINOPRIL 20 MG/1
20 TABLET ORAL DAILY
Status: ON HOLD | COMMUNITY
End: 2024-03-21 | Stop reason: HOSPADM

## 2024-03-13 RX ORDER — IPRATROPIUM BROMIDE AND ALBUTEROL SULFATE 2.5; .5 MG/3ML; MG/3ML
1 SOLUTION RESPIRATORY (INHALATION)
Status: DISCONTINUED | OUTPATIENT
Start: 2024-03-13 | End: 2024-03-21 | Stop reason: HOSPADM

## 2024-03-13 RX ORDER — VENLAFAXINE 75 MG/1
75 TABLET ORAL NIGHTLY
COMMUNITY

## 2024-03-13 RX ORDER — AMOXICILLIN AND CLAVULANATE POTASSIUM 875; 125 MG/1; MG/1
1 TABLET, FILM COATED ORAL 2 TIMES DAILY
Status: ON HOLD | COMMUNITY
End: 2024-03-21 | Stop reason: HOSPADM

## 2024-03-13 RX ORDER — SODIUM CHLORIDE 1 G/1
2 TABLET ORAL 2 TIMES DAILY WITH MEALS
Status: DISCONTINUED | OUTPATIENT
Start: 2024-03-13 | End: 2024-03-18

## 2024-03-13 RX ORDER — SODIUM BICARBONATE 650 MG/1
325 TABLET ORAL 2 TIMES DAILY
Status: ON HOLD | COMMUNITY
End: 2024-03-21 | Stop reason: HOSPADM

## 2024-03-13 RX ADMIN — SODIUM CHLORIDE: 9 INJECTION, SOLUTION INTRAVENOUS at 09:59

## 2024-03-13 RX ADMIN — SODIUM CHLORIDE, PRESERVATIVE FREE 10 ML: 5 INJECTION INTRAVENOUS at 20:32

## 2024-03-13 RX ADMIN — SODIUM CHLORIDE, PRESERVATIVE FREE 10 ML: 5 INJECTION INTRAVENOUS at 20:31

## 2024-03-13 RX ADMIN — THERA TABS 1 TABLET: TAB at 08:54

## 2024-03-13 RX ADMIN — IPRATROPIUM BROMIDE AND ALBUTEROL SULFATE 1 DOSE: 2.5; .5 SOLUTION RESPIRATORY (INHALATION) at 20:44

## 2024-03-13 RX ADMIN — LORAZEPAM 2 MG: 1 TABLET ORAL at 18:00

## 2024-03-13 RX ADMIN — IPRATROPIUM BROMIDE AND ALBUTEROL SULFATE 1 DOSE: .5; 2.5 SOLUTION RESPIRATORY (INHALATION) at 08:04

## 2024-03-13 RX ADMIN — THIAMINE HCL TAB 100 MG 100 MG: 100 TAB at 08:54

## 2024-03-13 RX ADMIN — ENOXAPARIN SODIUM 40 MG: 100 INJECTION SUBCUTANEOUS at 17:26

## 2024-03-13 RX ADMIN — Medication 2 G: at 17:26

## 2024-03-13 RX ADMIN — WHITE PETROLATUM,ZINC OXIDE: 57; 17 PASTE TOPICAL at 15:20

## 2024-03-13 RX ADMIN — IPRATROPIUM BROMIDE AND ALBUTEROL SULFATE 1 DOSE: 2.5; .5 SOLUTION RESPIRATORY (INHALATION) at 13:53

## 2024-03-13 RX ADMIN — SODIUM CHLORIDE, PRESERVATIVE FREE 10 ML: 5 INJECTION INTRAVENOUS at 10:05

## 2024-03-13 RX ADMIN — LORAZEPAM 2 MG: 2 INJECTION INTRAMUSCULAR; INTRAVENOUS at 06:31

## 2024-03-13 RX ADMIN — WHITE PETROLATUM,ZINC OXIDE: 57; 17 PASTE TOPICAL at 20:32

## 2024-03-13 NOTE — CARE COORDINATION
03/13/24 1532   Service Assessment   Patient Orientation Unable to Assess   Cognition Other (see comment)  (AMS)   History Provided By Medical Record   Primary Caregiver Self   Support Systems Parent;Family Members   Patient's Healthcare Decision Maker is: Legal Next of Kin   PCP Verified by CM No   Prior Functional Level Independent in ADLs/IADLs   Current Functional Level Independent in ADLs/IADLs   Can patient return to prior living arrangement Unknown at present   Ability to make needs known: Unable   Family able to assist with home care needs: Yes   Would you like for me to discuss the discharge plan with any other family members/significant others, and if so, who? Yes   Financial Resources None   Community Resources None   Social/Functional History   Lives With Parent   Occupation Unemployed   Discharge Planning   Potential Assistance Purchasing Medications No   Services At/After Discharge   Mode of Transport at Discharge Other (see comment)   Confirm Follow Up Transport Family     CM attempted to reach Pt mother and Aunt, left a VM for both requesting a return call.    Information from Medicals, Pt lives with his mother. Pt has AMS.     Pt independent with ADL    CM will continue to reach out to Pt mother.    Advance Care Planning     General Advance Care Planning (ACP) Conversation    Date of Conversation: 3/13/2024  Conducted with:     Healthcare Decision Maker:    Primary Decision Maker: Berkley Merida - Parent - [phone]  Click here to complete Healthcare Decision Makers including selection of the Healthcare Decision Maker Relationship (ie \"Primary\").   Today we     Content/Action Overview:    Reviewed DNR/DNI and patient elects Full Code (Attempt Resuscitation)

## 2024-03-14 LAB
ALBUMIN SERPL-MCNC: 2.2 G/DL (ref 3.5–5)
ALBUMIN/GLOB SERPL: 0.6 (ref 1.1–2.2)
ALP SERPL-CCNC: 120 U/L (ref 45–117)
ALT SERPL-CCNC: 14 U/L (ref 12–78)
ANION GAP SERPL CALC-SCNC: 4 MMOL/L (ref 5–15)
AST SERPL W P-5'-P-CCNC: 18 U/L (ref 15–37)
BILIRUB SERPL-MCNC: 1.1 MG/DL (ref 0.2–1)
BUN SERPL-MCNC: 5 MG/DL (ref 6–20)
BUN/CREAT SERPL: 24 (ref 12–20)
CA-I BLD-MCNC: 7.9 MG/DL (ref 8.5–10.1)
CHLORIDE SERPL-SCNC: 87 MMOL/L (ref 97–108)
CO2 SERPL-SCNC: 32 MMOL/L (ref 21–32)
CREAT SERPL-MCNC: 0.21 MG/DL (ref 0.7–1.3)
EKG ATRIAL RATE: 95 BPM
EKG DIAGNOSIS: NORMAL
EKG P AXIS: 74 DEGREES
EKG P-R INTERVAL: 162 MS
EKG Q-T INTERVAL: 364 MS
EKG QRS DURATION: 90 MS
EKG QTC CALCULATION (BAZETT): 457 MS
EKG R AXIS: 21 DEGREES
EKG T AXIS: 42 DEGREES
EKG VENTRICULAR RATE: 95 BPM
GLOBULIN SER CALC-MCNC: 3.4 G/DL (ref 2–4)
GLUCOSE SERPL-MCNC: 81 MG/DL (ref 65–100)
POTASSIUM SERPL-SCNC: 4.1 MMOL/L (ref 3.5–5.1)
PROT SERPL-MCNC: 5.6 G/DL (ref 6.4–8.2)
SODIUM SERPL-SCNC: 123 MMOL/L (ref 136–145)

## 2024-03-14 PROCEDURE — 6360000002 HC RX W HCPCS: Performed by: INTERNAL MEDICINE

## 2024-03-14 PROCEDURE — 2580000003 HC RX 258: Performed by: INTERNAL MEDICINE

## 2024-03-14 PROCEDURE — 2700000000 HC OXYGEN THERAPY PER DAY

## 2024-03-14 PROCEDURE — 80053 COMPREHEN METABOLIC PANEL: CPT

## 2024-03-14 PROCEDURE — 2000000000 HC ICU R&B

## 2024-03-14 PROCEDURE — 36415 COLL VENOUS BLD VENIPUNCTURE: CPT

## 2024-03-14 PROCEDURE — 94761 N-INVAS EAR/PLS OXIMETRY MLT: CPT

## 2024-03-14 PROCEDURE — 6370000000 HC RX 637 (ALT 250 FOR IP): Performed by: INTERNAL MEDICINE

## 2024-03-14 PROCEDURE — 6370000000 HC RX 637 (ALT 250 FOR IP): Performed by: STUDENT IN AN ORGANIZED HEALTH CARE EDUCATION/TRAINING PROGRAM

## 2024-03-14 PROCEDURE — 6360000002 HC RX W HCPCS: Performed by: STUDENT IN AN ORGANIZED HEALTH CARE EDUCATION/TRAINING PROGRAM

## 2024-03-14 PROCEDURE — 94640 AIRWAY INHALATION TREATMENT: CPT

## 2024-03-14 PROCEDURE — 2580000003 HC RX 258: Performed by: STUDENT IN AN ORGANIZED HEALTH CARE EDUCATION/TRAINING PROGRAM

## 2024-03-14 RX ORDER — SODIUM CHLORIDE 9 MG/ML
INJECTION, SOLUTION INTRAVENOUS CONTINUOUS
Status: DISCONTINUED | OUTPATIENT
Start: 2024-03-14 | End: 2024-03-18

## 2024-03-14 RX ADMIN — SODIUM CHLORIDE, PRESERVATIVE FREE 10 ML: 5 INJECTION INTRAVENOUS at 08:48

## 2024-03-14 RX ADMIN — SODIUM CHLORIDE, PRESERVATIVE FREE 10 ML: 5 INJECTION INTRAVENOUS at 20:05

## 2024-03-14 RX ADMIN — WHITE PETROLATUM,ZINC OXIDE: 57; 17 PASTE TOPICAL at 08:47

## 2024-03-14 RX ADMIN — ENOXAPARIN SODIUM 40 MG: 100 INJECTION SUBCUTANEOUS at 16:46

## 2024-03-14 RX ADMIN — IPRATROPIUM BROMIDE AND ALBUTEROL SULFATE 1 DOSE: 2.5; .5 SOLUTION RESPIRATORY (INHALATION) at 08:26

## 2024-03-14 RX ADMIN — SODIUM CHLORIDE: 9 INJECTION, SOLUTION INTRAVENOUS at 14:08

## 2024-03-14 RX ADMIN — IPRATROPIUM BROMIDE AND ALBUTEROL SULFATE 1 DOSE: 2.5; .5 SOLUTION RESPIRATORY (INHALATION) at 14:04

## 2024-03-14 RX ADMIN — SODIUM CHLORIDE, PRESERVATIVE FREE 10 ML: 5 INJECTION INTRAVENOUS at 08:47

## 2024-03-14 RX ADMIN — LORAZEPAM 3 MG: 2 INJECTION INTRAMUSCULAR; INTRAVENOUS at 01:13

## 2024-03-14 RX ADMIN — SODIUM CHLORIDE, PRESERVATIVE FREE 10 ML: 5 INJECTION INTRAVENOUS at 20:04

## 2024-03-14 RX ADMIN — IPRATROPIUM BROMIDE AND ALBUTEROL SULFATE 1 DOSE: 2.5; .5 SOLUTION RESPIRATORY (INHALATION) at 19:40

## 2024-03-14 RX ADMIN — Medication 2 PUFF: at 19:50

## 2024-03-14 RX ADMIN — WHITE PETROLATUM,ZINC OXIDE: 57; 17 PASTE TOPICAL at 20:05

## 2024-03-14 RX ADMIN — WHITE PETROLATUM,ZINC OXIDE: 57; 17 PASTE TOPICAL at 14:12

## 2024-03-14 NOTE — CARE COORDINATION
Pt mother called CM back, she stated that information on the face sheet is correct.    Pt mother stated that Pt 23 year old daughter stays with Pt.  Pt mother stated that Pt daughter has a 18 month old baby. Pt mother stated that Pt daughter is trying to go to school but that she does not want to leave her dad at home.     Pt mother stated that Pt daughter assist Pt with bathing and dressing.  Pt mother stated that Pt cannot even swallow his own spit. Pt mother stated Pt has lsot 80 lbs in the last 9 moths.     Pt mother stated that Pt is very weak, he cannot walk, Pt mother stated that Pt limitation are bad.      CM discussed with Pt mother D/C planning. Pt mother stated that he you would like to see what he can do.    Will need PT/OT eval/recommendation.

## 2024-03-15 ENCOUNTER — APPOINTMENT (OUTPATIENT)
Facility: HOSPITAL | Age: 54
DRG: 426 | End: 2024-03-15
Payer: COMMERCIAL

## 2024-03-15 LAB
ALBUMIN SERPL-MCNC: 2.2 G/DL (ref 3.5–5)
ALBUMIN SERPL-MCNC: 2.2 G/DL (ref 3.5–5)
ALBUMIN/GLOB SERPL: 0.6 (ref 1.1–2.2)
ALP SERPL-CCNC: 122 U/L (ref 45–117)
ALT SERPL-CCNC: 13 U/L (ref 12–78)
ANION GAP SERPL CALC-SCNC: 6 MMOL/L (ref 5–15)
ANION GAP SERPL CALC-SCNC: 6 MMOL/L (ref 5–15)
APTT PPP: 35.3 SEC (ref 21.2–34.1)
AST SERPL W P-5'-P-CCNC: 18 U/L (ref 15–37)
BILIRUB SERPL-MCNC: 1.2 MG/DL (ref 0.2–1)
BUN SERPL-MCNC: 4 MG/DL (ref 6–20)
BUN SERPL-MCNC: 4 MG/DL (ref 6–20)
BUN/CREAT SERPL: 27 (ref 12–20)
BUN/CREAT SERPL: ABNORMAL (ref 12–20)
CA-I BLD-MCNC: 8.3 MG/DL (ref 8.5–10.1)
CA-I BLD-MCNC: 8.4 MG/DL (ref 8.5–10.1)
CHLORIDE SERPL-SCNC: 88 MMOL/L (ref 97–108)
CHLORIDE SERPL-SCNC: 91 MMOL/L (ref 97–108)
CO2 SERPL-SCNC: 32 MMOL/L (ref 21–32)
CO2 SERPL-SCNC: 32 MMOL/L (ref 21–32)
CREAT SERPL-MCNC: 0.15 MG/DL (ref 0.7–1.3)
CREAT SERPL-MCNC: <0.15 MG/DL (ref 0.7–1.3)
ERYTHROCYTE [DISTWIDTH] IN BLOOD BY AUTOMATED COUNT: 17 % (ref 11.5–14.5)
GLOBULIN SER CALC-MCNC: 3.6 G/DL (ref 2–4)
GLUCOSE SERPL-MCNC: 72 MG/DL (ref 65–100)
GLUCOSE SERPL-MCNC: 77 MG/DL (ref 65–100)
HCT VFR BLD AUTO: 30.3 % (ref 36.6–50.3)
HGB BLD-MCNC: 10.1 G/DL (ref 12.1–17)
INR PPP: 1.1 (ref 0.9–1.1)
MCH RBC QN AUTO: 32.6 PG (ref 26–34)
MCHC RBC AUTO-ENTMCNC: 33.3 G/DL (ref 30–36.5)
MCV RBC AUTO: 97.7 FL (ref 80–99)
NRBC # BLD: 0 K/UL (ref 0–0.01)
NRBC BLD-RTO: 0 PER 100 WBC
PHOSPHATE SERPL-MCNC: 2.7 MG/DL (ref 2.6–4.7)
PLATELET # BLD AUTO: 166 K/UL (ref 150–400)
PMV BLD AUTO: 9.1 FL (ref 8.9–12.9)
POTASSIUM SERPL-SCNC: 4 MMOL/L (ref 3.5–5.1)
POTASSIUM SERPL-SCNC: 4.1 MMOL/L (ref 3.5–5.1)
PROT SERPL-MCNC: 5.8 G/DL (ref 6.4–8.2)
PROTHROMBIN TIME: 14.2 SEC (ref 11.9–14.6)
RBC # BLD AUTO: 3.1 M/UL (ref 4.1–5.7)
SODIUM SERPL-SCNC: 126 MMOL/L (ref 136–145)
SODIUM SERPL-SCNC: 129 MMOL/L (ref 136–145)
THERAPEUTIC RANGE: ABNORMAL SEC (ref 82–109)
UFH PPP CHRO-ACNC: <0.1 IU/ML
WBC # BLD AUTO: 11.5 K/UL (ref 4.1–11.1)

## 2024-03-15 PROCEDURE — 2580000003 HC RX 258: Performed by: INTERNAL MEDICINE

## 2024-03-15 PROCEDURE — 2700000000 HC OXYGEN THERAPY PER DAY

## 2024-03-15 PROCEDURE — 2000000000 HC ICU R&B

## 2024-03-15 PROCEDURE — 85520 HEPARIN ASSAY: CPT

## 2024-03-15 PROCEDURE — 93005 ELECTROCARDIOGRAM TRACING: CPT

## 2024-03-15 PROCEDURE — 85610 PROTHROMBIN TIME: CPT

## 2024-03-15 PROCEDURE — 80053 COMPREHEN METABOLIC PANEL: CPT

## 2024-03-15 PROCEDURE — 6370000000 HC RX 637 (ALT 250 FOR IP): Performed by: INTERNAL MEDICINE

## 2024-03-15 PROCEDURE — 2580000003 HC RX 258: Performed by: STUDENT IN AN ORGANIZED HEALTH CARE EDUCATION/TRAINING PROGRAM

## 2024-03-15 PROCEDURE — 94761 N-INVAS EAR/PLS OXIMETRY MLT: CPT

## 2024-03-15 PROCEDURE — 6370000000 HC RX 637 (ALT 250 FOR IP): Performed by: STUDENT IN AN ORGANIZED HEALTH CARE EDUCATION/TRAINING PROGRAM

## 2024-03-15 PROCEDURE — 94640 AIRWAY INHALATION TREATMENT: CPT

## 2024-03-15 PROCEDURE — 92610 EVALUATE SWALLOWING FUNCTION: CPT

## 2024-03-15 PROCEDURE — 2500000003 HC RX 250 WO HCPCS: Performed by: STUDENT IN AN ORGANIZED HEALTH CARE EDUCATION/TRAINING PROGRAM

## 2024-03-15 PROCEDURE — 80069 RENAL FUNCTION PANEL: CPT

## 2024-03-15 PROCEDURE — 36415 COLL VENOUS BLD VENIPUNCTURE: CPT

## 2024-03-15 PROCEDURE — 71045 X-RAY EXAM CHEST 1 VIEW: CPT

## 2024-03-15 PROCEDURE — 1100000000 HC RM PRIVATE

## 2024-03-15 PROCEDURE — 85730 THROMBOPLASTIN TIME PARTIAL: CPT

## 2024-03-15 PROCEDURE — 85027 COMPLETE CBC AUTOMATED: CPT

## 2024-03-15 PROCEDURE — 6360000002 HC RX W HCPCS: Performed by: INTERNAL MEDICINE

## 2024-03-15 PROCEDURE — 6360000002 HC RX W HCPCS: Performed by: STUDENT IN AN ORGANIZED HEALTH CARE EDUCATION/TRAINING PROGRAM

## 2024-03-15 RX ORDER — 0.9 % SODIUM CHLORIDE 0.9 %
1000 INTRAVENOUS SOLUTION INTRAVENOUS ONCE
Status: COMPLETED | OUTPATIENT
Start: 2024-03-15 | End: 2024-03-15

## 2024-03-15 RX ORDER — HEPARIN SODIUM 1000 [USP'U]/ML
4000 INJECTION, SOLUTION INTRAVENOUS; SUBCUTANEOUS PRN
Status: DISCONTINUED | OUTPATIENT
Start: 2024-03-15 | End: 2024-03-19

## 2024-03-15 RX ORDER — HEPARIN SODIUM 1000 [USP'U]/ML
4000 INJECTION, SOLUTION INTRAVENOUS; SUBCUTANEOUS ONCE
Status: COMPLETED | OUTPATIENT
Start: 2024-03-15 | End: 2024-03-15

## 2024-03-15 RX ORDER — OXYCODONE HYDROCHLORIDE 5 MG/1
5 TABLET ORAL EVERY 4 HOURS PRN
Status: DISCONTINUED | OUTPATIENT
Start: 2024-03-15 | End: 2024-03-17

## 2024-03-15 RX ORDER — TRAZODONE HYDROCHLORIDE 50 MG/1
50 TABLET ORAL NIGHTLY
Status: DISCONTINUED | OUTPATIENT
Start: 2024-03-15 | End: 2024-03-21 | Stop reason: HOSPADM

## 2024-03-15 RX ORDER — HEPARIN SODIUM 1000 [USP'U]/ML
2000 INJECTION, SOLUTION INTRAVENOUS; SUBCUTANEOUS PRN
Status: DISCONTINUED | OUTPATIENT
Start: 2024-03-15 | End: 2024-03-19

## 2024-03-15 RX ORDER — METOPROLOL TARTRATE 1 MG/ML
5 INJECTION, SOLUTION INTRAVENOUS ONCE
Status: COMPLETED | OUTPATIENT
Start: 2024-03-15 | End: 2024-03-15

## 2024-03-15 RX ORDER — DILTIAZEM HYDROCHLORIDE 5 MG/ML
10 INJECTION INTRAVENOUS ONCE
Status: DISCONTINUED | OUTPATIENT
Start: 2024-03-15 | End: 2024-03-16

## 2024-03-15 RX ORDER — HEPARIN SODIUM 10000 [USP'U]/100ML
5-30 INJECTION, SOLUTION INTRAVENOUS CONTINUOUS
Status: DISCONTINUED | OUTPATIENT
Start: 2024-03-15 | End: 2024-03-19

## 2024-03-15 RX ORDER — METOPROLOL TARTRATE 1 MG/ML
5 INJECTION, SOLUTION INTRAVENOUS AS NEEDED
Status: DISCONTINUED | OUTPATIENT
Start: 2024-03-15 | End: 2024-03-21 | Stop reason: HOSPADM

## 2024-03-15 RX ADMIN — IPRATROPIUM BROMIDE AND ALBUTEROL SULFATE 1 DOSE: 2.5; .5 SOLUTION RESPIRATORY (INHALATION) at 20:47

## 2024-03-15 RX ADMIN — IPRATROPIUM BROMIDE AND ALBUTEROL SULFATE 1 DOSE: 2.5; .5 SOLUTION RESPIRATORY (INHALATION) at 08:42

## 2024-03-15 RX ADMIN — LORAZEPAM 1 MG: 1 TABLET ORAL at 15:10

## 2024-03-15 RX ADMIN — METOPROLOL TARTRATE 5 MG: 5 INJECTION INTRAVENOUS at 17:56

## 2024-03-15 RX ADMIN — SODIUM CHLORIDE 1000 ML: 9 INJECTION, SOLUTION INTRAVENOUS at 22:37

## 2024-03-15 RX ADMIN — Medication 2 PUFF: at 20:58

## 2024-03-15 RX ADMIN — METOPROLOL TARTRATE 5 MG: 5 INJECTION INTRAVENOUS at 17:11

## 2024-03-15 RX ADMIN — OXYCODONE 5 MG: 5 TABLET ORAL at 20:29

## 2024-03-15 RX ADMIN — SODIUM CHLORIDE, PRESERVATIVE FREE 10 ML: 5 INJECTION INTRAVENOUS at 20:16

## 2024-03-15 RX ADMIN — HEPARIN SODIUM 4000 UNITS: 1000 INJECTION INTRAVENOUS; SUBCUTANEOUS at 20:11

## 2024-03-15 RX ADMIN — Medication 2 G: at 18:24

## 2024-03-15 RX ADMIN — WHITE PETROLATUM,ZINC OXIDE: 57; 17 PASTE TOPICAL at 20:16

## 2024-03-15 RX ADMIN — DILTIAZEM HYDROCHLORIDE 5 MG/HR: 5 INJECTION, SOLUTION INTRAVENOUS at 20:06

## 2024-03-15 RX ADMIN — HEPARIN SODIUM AND DEXTROSE 12 UNITS/KG/HR: 10000; 5 INJECTION INTRAVENOUS at 20:11

## 2024-03-15 RX ADMIN — ENOXAPARIN SODIUM 40 MG: 100 INJECTION SUBCUTANEOUS at 18:24

## 2024-03-15 RX ADMIN — SODIUM CHLORIDE 1000 ML: 9 INJECTION, SOLUTION INTRAVENOUS at 21:32

## 2024-03-15 RX ADMIN — TRAZODONE HYDROCHLORIDE 50 MG: 50 TABLET ORAL at 20:32

## 2024-03-15 RX ADMIN — SODIUM CHLORIDE, PRESERVATIVE FREE 10 ML: 5 INJECTION INTRAVENOUS at 12:10

## 2024-03-15 RX ADMIN — IPRATROPIUM BROMIDE AND ALBUTEROL SULFATE 1 DOSE: 2.5; .5 SOLUTION RESPIRATORY (INHALATION) at 14:17

## 2024-03-15 RX ADMIN — ACETAMINOPHEN 650 MG: 325 TABLET ORAL at 15:22

## 2024-03-15 RX ADMIN — SODIUM CHLORIDE, PRESERVATIVE FREE 10 ML: 5 INJECTION INTRAVENOUS at 12:09

## 2024-03-15 RX ADMIN — SODIUM CHLORIDE: 9 INJECTION, SOLUTION INTRAVENOUS at 12:10

## 2024-03-15 RX ADMIN — WHITE PETROLATUM,ZINC OXIDE: 57; 17 PASTE TOPICAL at 09:00

## 2024-03-15 RX ADMIN — WHITE PETROLATUM,ZINC OXIDE: 57; 17 PASTE TOPICAL at 15:16

## 2024-03-15 ASSESSMENT — PAIN SCALES - GENERAL
PAINLEVEL_OUTOF10: 5
PAINLEVEL_OUTOF10: 7
PAINLEVEL_OUTOF10: 3
PAINLEVEL_OUTOF10: 0
PAINLEVEL_OUTOF10: 5
PAINLEVEL_OUTOF10: 5
PAINLEVEL_OUTOF10: 8

## 2024-03-15 ASSESSMENT — PAIN - FUNCTIONAL ASSESSMENT
PAIN_FUNCTIONAL_ASSESSMENT: PREVENTS OR INTERFERES SOME ACTIVE ACTIVITIES AND ADLS
PAIN_FUNCTIONAL_ASSESSMENT: PREVENTS OR INTERFERES SOME ACTIVE ACTIVITIES AND ADLS

## 2024-03-15 ASSESSMENT — PAIN DESCRIPTION - ORIENTATION
ORIENTATION: POSTERIOR
ORIENTATION: POSTERIOR

## 2024-03-15 ASSESSMENT — PAIN DESCRIPTION - FREQUENCY
FREQUENCY: CONTINUOUS
FREQUENCY: CONTINUOUS

## 2024-03-15 ASSESSMENT — PAIN DESCRIPTION - LOCATION
LOCATION: NECK
LOCATION: BACK;NECK
LOCATION: NECK;BACK
LOCATION: NECK

## 2024-03-15 ASSESSMENT — PAIN DESCRIPTION - DESCRIPTORS
DESCRIPTORS: ACHING;THROBBING
DESCRIPTORS: ACHING

## 2024-03-15 ASSESSMENT — PAIN DESCRIPTION - ONSET
ONSET: ON-GOING
ONSET: ON-GOING

## 2024-03-15 ASSESSMENT — PAIN DESCRIPTION - PAIN TYPE
TYPE: CHRONIC PAIN
TYPE: CHRONIC PAIN

## 2024-03-15 NOTE — CARE COORDINATION
CM reviewed Pt medicals, Pt lives with his daughter who assist him with ADL.    Pt mother is concerned about how weak Pt has been. Will need PT/OT eval/recommendation when medically appropriate.     CM will follow.

## 2024-03-16 LAB
ALBUMIN SERPL-MCNC: 2.2 G/DL (ref 3.5–5)
ALBUMIN/GLOB SERPL: 0.6 (ref 1.1–2.2)
ALP SERPL-CCNC: 96 U/L (ref 45–117)
ALT SERPL-CCNC: 15 U/L (ref 12–78)
ANION GAP SERPL CALC-SCNC: 2 MMOL/L (ref 5–15)
ANION GAP SERPL CALC-SCNC: 3 MMOL/L (ref 5–15)
APTT PPP: 64.1 SEC (ref 21.2–34.1)
APTT PPP: 66.6 SEC (ref 21.2–34.1)
APTT PPP: 72 SEC (ref 21.2–34.1)
AST SERPL W P-5'-P-CCNC: 17 U/L (ref 15–37)
BILIRUB SERPL-MCNC: 0.7 MG/DL (ref 0.2–1)
BNP SERPL-MCNC: 8190 PG/ML
BUN SERPL-MCNC: 10 MG/DL (ref 6–20)
BUN SERPL-MCNC: 5 MG/DL (ref 6–20)
BUN/CREAT SERPL: 18 (ref 12–20)
BUN/CREAT SERPL: 34 (ref 12–20)
CA-I BLD-MCNC: 7.9 MG/DL (ref 8.5–10.1)
CA-I BLD-MCNC: 8.1 MG/DL (ref 8.5–10.1)
CHLORIDE SERPL-SCNC: 94 MMOL/L (ref 97–108)
CHLORIDE SERPL-SCNC: 96 MMOL/L (ref 97–108)
CO2 SERPL-SCNC: 32 MMOL/L (ref 21–32)
CO2 SERPL-SCNC: 33 MMOL/L (ref 21–32)
CREAT SERPL-MCNC: 0.28 MG/DL (ref 0.7–1.3)
CREAT SERPL-MCNC: 0.29 MG/DL (ref 0.7–1.3)
ERYTHROCYTE [DISTWIDTH] IN BLOOD BY AUTOMATED COUNT: 16.9 % (ref 11.5–14.5)
GLOBULIN SER CALC-MCNC: 3.4 G/DL (ref 2–4)
GLUCOSE BLD STRIP.AUTO-MCNC: 147 MG/DL (ref 65–100)
GLUCOSE SERPL-MCNC: 136 MG/DL (ref 65–100)
GLUCOSE SERPL-MCNC: 145 MG/DL (ref 65–100)
HCT VFR BLD AUTO: 27.4 % (ref 36.6–50.3)
HGB BLD-MCNC: 8.9 G/DL (ref 12.1–17)
MAGNESIUM SERPL-MCNC: 1.3 MG/DL (ref 1.6–2.4)
MAGNESIUM SERPL-MCNC: 2.1 MG/DL (ref 1.6–2.4)
MCH RBC QN AUTO: 32.4 PG (ref 26–34)
MCHC RBC AUTO-ENTMCNC: 32.5 G/DL (ref 30–36.5)
MCV RBC AUTO: 99.6 FL (ref 80–99)
NRBC # BLD: 0 K/UL (ref 0–0.01)
NRBC BLD-RTO: 0 PER 100 WBC
PERFORMED BY:: ABNORMAL
PLATELET # BLD AUTO: 143 K/UL (ref 150–400)
PMV BLD AUTO: 9.1 FL (ref 8.9–12.9)
POTASSIUM SERPL-SCNC: 3.6 MMOL/L (ref 3.5–5.1)
POTASSIUM SERPL-SCNC: 4 MMOL/L (ref 3.5–5.1)
PROT SERPL-MCNC: 5.6 G/DL (ref 6.4–8.2)
RBC # BLD AUTO: 2.75 M/UL (ref 4.1–5.7)
SODIUM SERPL-SCNC: 129 MMOL/L (ref 136–145)
SODIUM SERPL-SCNC: 131 MMOL/L (ref 136–145)
THERAPEUTIC RANGE: ABNORMAL SEC (ref 82–109)
WBC # BLD AUTO: 10.3 K/UL (ref 4.1–11.1)

## 2024-03-16 PROCEDURE — 80053 COMPREHEN METABOLIC PANEL: CPT

## 2024-03-16 PROCEDURE — 94640 AIRWAY INHALATION TREATMENT: CPT

## 2024-03-16 PROCEDURE — 6370000000 HC RX 637 (ALT 250 FOR IP): Performed by: INTERNAL MEDICINE

## 2024-03-16 PROCEDURE — 2580000003 HC RX 258: Performed by: STUDENT IN AN ORGANIZED HEALTH CARE EDUCATION/TRAINING PROGRAM

## 2024-03-16 PROCEDURE — 83735 ASSAY OF MAGNESIUM: CPT

## 2024-03-16 PROCEDURE — 6360000002 HC RX W HCPCS: Performed by: STUDENT IN AN ORGANIZED HEALTH CARE EDUCATION/TRAINING PROGRAM

## 2024-03-16 PROCEDURE — 85730 THROMBOPLASTIN TIME PARTIAL: CPT

## 2024-03-16 PROCEDURE — 6370000000 HC RX 637 (ALT 250 FOR IP): Performed by: STUDENT IN AN ORGANIZED HEALTH CARE EDUCATION/TRAINING PROGRAM

## 2024-03-16 PROCEDURE — 6360000002 HC RX W HCPCS: Performed by: INTERNAL MEDICINE

## 2024-03-16 PROCEDURE — 94761 N-INVAS EAR/PLS OXIMETRY MLT: CPT

## 2024-03-16 PROCEDURE — 2580000003 HC RX 258: Performed by: INTERNAL MEDICINE

## 2024-03-16 PROCEDURE — 2700000000 HC OXYGEN THERAPY PER DAY

## 2024-03-16 PROCEDURE — 83880 ASSAY OF NATRIURETIC PEPTIDE: CPT

## 2024-03-16 PROCEDURE — 2000000000 HC ICU R&B

## 2024-03-16 PROCEDURE — 51798 US URINE CAPACITY MEASURE: CPT

## 2024-03-16 PROCEDURE — 36415 COLL VENOUS BLD VENIPUNCTURE: CPT

## 2024-03-16 PROCEDURE — 92526 ORAL FUNCTION THERAPY: CPT

## 2024-03-16 PROCEDURE — 85027 COMPLETE CBC AUTOMATED: CPT

## 2024-03-16 PROCEDURE — 80048 BASIC METABOLIC PNL TOTAL CA: CPT

## 2024-03-16 PROCEDURE — 82962 GLUCOSE BLOOD TEST: CPT

## 2024-03-16 RX ADMIN — Medication 2 PUFF: at 08:35

## 2024-03-16 RX ADMIN — HEPARIN SODIUM AND DEXTROSE 16 UNITS/KG/HR: 10000; 5 INJECTION INTRAVENOUS at 15:44

## 2024-03-16 RX ADMIN — SODIUM CHLORIDE: 9 INJECTION, SOLUTION INTRAVENOUS at 14:23

## 2024-03-16 RX ADMIN — IPRATROPIUM BROMIDE AND ALBUTEROL SULFATE 1 DOSE: 2.5; .5 SOLUTION RESPIRATORY (INHALATION) at 21:23

## 2024-03-16 RX ADMIN — METOPROLOL TARTRATE 25 MG: 25 TABLET, FILM COATED ORAL at 11:57

## 2024-03-16 RX ADMIN — Medication 2 G: at 08:04

## 2024-03-16 RX ADMIN — HEPARIN SODIUM 2000 UNITS: 1000 INJECTION INTRAVENOUS; SUBCUTANEOUS at 10:43

## 2024-03-16 RX ADMIN — WHITE PETROLATUM,ZINC OXIDE: 57; 17 PASTE TOPICAL at 15:46

## 2024-03-16 RX ADMIN — SODIUM CHLORIDE, PRESERVATIVE FREE 10 ML: 5 INJECTION INTRAVENOUS at 19:11

## 2024-03-16 RX ADMIN — METOPROLOL TARTRATE 25 MG: 25 TABLET, FILM COATED ORAL at 19:11

## 2024-03-16 RX ADMIN — SODIUM CHLORIDE, PRESERVATIVE FREE 10 ML: 5 INJECTION INTRAVENOUS at 08:13

## 2024-03-16 RX ADMIN — SODIUM CHLORIDE: 9 INJECTION, SOLUTION INTRAVENOUS at 03:13

## 2024-03-16 RX ADMIN — THERA TABS 1 TABLET: TAB at 08:06

## 2024-03-16 RX ADMIN — WHITE PETROLATUM,ZINC OXIDE: 57; 17 PASTE TOPICAL at 08:14

## 2024-03-16 RX ADMIN — Medication 2 G: at 16:53

## 2024-03-16 RX ADMIN — HEPARIN SODIUM 2000 UNITS: 1000 INJECTION INTRAVENOUS; SUBCUTANEOUS at 18:12

## 2024-03-16 RX ADMIN — WHITE PETROLATUM,ZINC OXIDE: 57; 17 PASTE TOPICAL at 19:11

## 2024-03-16 RX ADMIN — THIAMINE HCL TAB 100 MG 100 MG: 100 TAB at 08:06

## 2024-03-16 RX ADMIN — TRAZODONE HYDROCHLORIDE 50 MG: 50 TABLET ORAL at 22:53

## 2024-03-16 RX ADMIN — IPRATROPIUM BROMIDE AND ALBUTEROL SULFATE 1 DOSE: 2.5; .5 SOLUTION RESPIRATORY (INHALATION) at 13:34

## 2024-03-16 RX ADMIN — OXYCODONE 5 MG: 5 TABLET ORAL at 19:16

## 2024-03-16 RX ADMIN — IPRATROPIUM BROMIDE AND ALBUTEROL SULFATE 1 DOSE: 2.5; .5 SOLUTION RESPIRATORY (INHALATION) at 08:35

## 2024-03-16 RX ADMIN — HEPARIN SODIUM 2000 UNITS: 1000 INJECTION INTRAVENOUS; SUBCUTANEOUS at 03:14

## 2024-03-16 RX ADMIN — MAGNESIUM SULFATE HEPTAHYDRATE 2000 MG: 40 INJECTION, SOLUTION INTRAVENOUS at 05:10

## 2024-03-16 RX ADMIN — MAGNESIUM SULFATE HEPTAHYDRATE 2000 MG: 40 INJECTION, SOLUTION INTRAVENOUS at 03:18

## 2024-03-16 ASSESSMENT — PAIN DESCRIPTION - ONSET
ONSET: ON-GOING

## 2024-03-16 ASSESSMENT — PAIN DESCRIPTION - ORIENTATION
ORIENTATION: POSTERIOR

## 2024-03-16 ASSESSMENT — PAIN SCALES - GENERAL
PAINLEVEL_OUTOF10: 0
PAINLEVEL_OUTOF10: 0
PAINLEVEL_OUTOF10: 8
PAINLEVEL_OUTOF10: 3
PAINLEVEL_OUTOF10: 0

## 2024-03-16 ASSESSMENT — PAIN DESCRIPTION - FREQUENCY
FREQUENCY: CONTINUOUS

## 2024-03-16 ASSESSMENT — PAIN DESCRIPTION - DESCRIPTORS
DESCRIPTORS: ACHING
DESCRIPTORS: ACHING;THROBBING
DESCRIPTORS: ACHING

## 2024-03-16 ASSESSMENT — PAIN - FUNCTIONAL ASSESSMENT
PAIN_FUNCTIONAL_ASSESSMENT: PREVENTS OR INTERFERES SOME ACTIVE ACTIVITIES AND ADLS

## 2024-03-16 ASSESSMENT — PAIN DESCRIPTION - LOCATION
LOCATION: NECK;BACK
LOCATION: BACK;NECK
LOCATION: BACK;NECK

## 2024-03-16 ASSESSMENT — PAIN DESCRIPTION - PAIN TYPE
TYPE: CHRONIC PAIN

## 2024-03-17 LAB
ALBUMIN SERPL-MCNC: 2 G/DL (ref 3.5–5)
ALBUMIN/GLOB SERPL: 0.6 (ref 1.1–2.2)
ALP SERPL-CCNC: 86 U/L (ref 45–117)
ALT SERPL-CCNC: 13 U/L (ref 12–78)
ANION GAP SERPL CALC-SCNC: 3 MMOL/L (ref 5–15)
ANION GAP SERPL CALC-SCNC: 4 MMOL/L (ref 5–15)
APTT PPP: 141.6 SEC (ref 21.2–34.1)
APTT PPP: 56 SEC (ref 21.2–34.1)
APTT PPP: 77.4 SEC (ref 21.2–34.1)
APTT PPP: >153 SEC (ref 21.2–34.1)
AST SERPL W P-5'-P-CCNC: 16 U/L (ref 15–37)
BILIRUB SERPL-MCNC: 0.6 MG/DL (ref 0.2–1)
BUN SERPL-MCNC: 2 MG/DL (ref 6–20)
BUN SERPL-MCNC: 3 MG/DL (ref 6–20)
BUN/CREAT SERPL: 17 (ref 12–20)
BUN/CREAT SERPL: 6 (ref 12–20)
CA-I BLD-MCNC: 7.7 MG/DL (ref 8.5–10.1)
CA-I BLD-MCNC: 7.8 MG/DL (ref 8.5–10.1)
CHLORIDE SERPL-SCNC: 94 MMOL/L (ref 97–108)
CHLORIDE SERPL-SCNC: 95 MMOL/L (ref 97–108)
CO2 SERPL-SCNC: 32 MMOL/L (ref 21–32)
CO2 SERPL-SCNC: 33 MMOL/L (ref 21–32)
CREAT SERPL-MCNC: 0.18 MG/DL (ref 0.7–1.3)
CREAT SERPL-MCNC: 0.31 MG/DL (ref 0.7–1.3)
ERYTHROCYTE [DISTWIDTH] IN BLOOD BY AUTOMATED COUNT: 16.6 % (ref 11.5–14.5)
GLOBULIN SER CALC-MCNC: 3.2 G/DL (ref 2–4)
GLUCOSE SERPL-MCNC: 145 MG/DL (ref 65–100)
GLUCOSE SERPL-MCNC: 95 MG/DL (ref 65–100)
HCT VFR BLD AUTO: 25.3 % (ref 36.6–50.3)
HGB BLD-MCNC: 8.3 G/DL (ref 12.1–17)
MAGNESIUM SERPL-MCNC: 1.4 MG/DL (ref 1.6–2.4)
MCH RBC QN AUTO: 32.2 PG (ref 26–34)
MCHC RBC AUTO-ENTMCNC: 32.8 G/DL (ref 30–36.5)
MCV RBC AUTO: 98.1 FL (ref 80–99)
NRBC # BLD: 0 K/UL (ref 0–0.01)
NRBC BLD-RTO: 0 PER 100 WBC
PLATELET # BLD AUTO: 148 K/UL (ref 150–400)
PMV BLD AUTO: 8.7 FL (ref 8.9–12.9)
POTASSIUM SERPL-SCNC: 3.5 MMOL/L (ref 3.5–5.1)
POTASSIUM SERPL-SCNC: 3.9 MMOL/L (ref 3.5–5.1)
PROT SERPL-MCNC: 5.2 G/DL (ref 6.4–8.2)
RBC # BLD AUTO: 2.58 M/UL (ref 4.1–5.7)
SODIUM SERPL-SCNC: 130 MMOL/L (ref 136–145)
SODIUM SERPL-SCNC: 131 MMOL/L (ref 136–145)
THERAPEUTIC RANGE: ABNORMAL SEC
THERAPEUTIC RANGE: ABNORMAL SEC (ref 82–109)
WBC # BLD AUTO: 6.6 K/UL (ref 4.1–11.1)

## 2024-03-17 PROCEDURE — 85730 THROMBOPLASTIN TIME PARTIAL: CPT

## 2024-03-17 PROCEDURE — 2580000003 HC RX 258: Performed by: INTERNAL MEDICINE

## 2024-03-17 PROCEDURE — 36415 COLL VENOUS BLD VENIPUNCTURE: CPT

## 2024-03-17 PROCEDURE — 92526 ORAL FUNCTION THERAPY: CPT

## 2024-03-17 PROCEDURE — 6370000000 HC RX 637 (ALT 250 FOR IP): Performed by: INTERNAL MEDICINE

## 2024-03-17 PROCEDURE — 83735 ASSAY OF MAGNESIUM: CPT

## 2024-03-17 PROCEDURE — 6370000000 HC RX 637 (ALT 250 FOR IP): Performed by: STUDENT IN AN ORGANIZED HEALTH CARE EDUCATION/TRAINING PROGRAM

## 2024-03-17 PROCEDURE — 2580000003 HC RX 258: Performed by: STUDENT IN AN ORGANIZED HEALTH CARE EDUCATION/TRAINING PROGRAM

## 2024-03-17 PROCEDURE — 6360000002 HC RX W HCPCS: Performed by: INTERNAL MEDICINE

## 2024-03-17 PROCEDURE — 80048 BASIC METABOLIC PNL TOTAL CA: CPT

## 2024-03-17 PROCEDURE — 94761 N-INVAS EAR/PLS OXIMETRY MLT: CPT

## 2024-03-17 PROCEDURE — 2700000000 HC OXYGEN THERAPY PER DAY

## 2024-03-17 PROCEDURE — 94640 AIRWAY INHALATION TREATMENT: CPT

## 2024-03-17 PROCEDURE — 80053 COMPREHEN METABOLIC PANEL: CPT

## 2024-03-17 PROCEDURE — 85027 COMPLETE CBC AUTOMATED: CPT

## 2024-03-17 PROCEDURE — 6360000002 HC RX W HCPCS: Performed by: STUDENT IN AN ORGANIZED HEALTH CARE EDUCATION/TRAINING PROGRAM

## 2024-03-17 PROCEDURE — 1100000000 HC RM PRIVATE

## 2024-03-17 RX ADMIN — WHITE PETROLATUM,ZINC OXIDE: 57; 17 PASTE TOPICAL at 15:48

## 2024-03-17 RX ADMIN — SODIUM CHLORIDE, PRESERVATIVE FREE 10 ML: 5 INJECTION INTRAVENOUS at 08:08

## 2024-03-17 RX ADMIN — Medication 2 G: at 08:03

## 2024-03-17 RX ADMIN — HEPARIN SODIUM AND DEXTROSE 17 UNITS/KG/HR: 10000; 5 INJECTION INTRAVENOUS at 11:46

## 2024-03-17 RX ADMIN — HEPARIN SODIUM 2000 UNITS: 1000 INJECTION INTRAVENOUS; SUBCUTANEOUS at 09:40

## 2024-03-17 RX ADMIN — THERA TABS 1 TABLET: TAB at 08:03

## 2024-03-17 RX ADMIN — METOPROLOL TARTRATE 25 MG: 25 TABLET, FILM COATED ORAL at 21:06

## 2024-03-17 RX ADMIN — TRAZODONE HYDROCHLORIDE 50 MG: 50 TABLET ORAL at 21:06

## 2024-03-17 RX ADMIN — WHITE PETROLATUM,ZINC OXIDE: 57; 17 PASTE TOPICAL at 08:10

## 2024-03-17 RX ADMIN — Medication 2 PUFF: at 20:51

## 2024-03-17 RX ADMIN — MAGNESIUM SULFATE HEPTAHYDRATE 2000 MG: 40 INJECTION, SOLUTION INTRAVENOUS at 04:52

## 2024-03-17 RX ADMIN — SODIUM CHLORIDE, PRESERVATIVE FREE 10 ML: 5 INJECTION INTRAVENOUS at 21:07

## 2024-03-17 RX ADMIN — IPRATROPIUM BROMIDE AND ALBUTEROL SULFATE 1 DOSE: 2.5; .5 SOLUTION RESPIRATORY (INHALATION) at 07:46

## 2024-03-17 RX ADMIN — IPRATROPIUM BROMIDE AND ALBUTEROL SULFATE 1 DOSE: 2.5; .5 SOLUTION RESPIRATORY (INHALATION) at 14:08

## 2024-03-17 RX ADMIN — THIAMINE HCL TAB 100 MG 100 MG: 100 TAB at 08:03

## 2024-03-17 RX ADMIN — SODIUM CHLORIDE: 9 INJECTION, SOLUTION INTRAVENOUS at 03:51

## 2024-03-17 RX ADMIN — IPRATROPIUM BROMIDE AND ALBUTEROL SULFATE 1 DOSE: 2.5; .5 SOLUTION RESPIRATORY (INHALATION) at 20:50

## 2024-03-17 RX ADMIN — SODIUM CHLORIDE, PRESERVATIVE FREE 10 ML: 5 INJECTION INTRAVENOUS at 21:08

## 2024-03-17 RX ADMIN — METOPROLOL TARTRATE 25 MG: 25 TABLET, FILM COATED ORAL at 08:03

## 2024-03-17 RX ADMIN — Medication 2 PUFF: at 07:46

## 2024-03-17 RX ADMIN — ACETAMINOPHEN 650 MG: 325 TABLET ORAL at 21:06

## 2024-03-17 RX ADMIN — HEPARIN SODIUM 2000 UNITS: 1000 INJECTION INTRAVENOUS; SUBCUTANEOUS at 17:02

## 2024-03-17 RX ADMIN — Medication 2 G: at 17:04

## 2024-03-17 RX ADMIN — WHITE PETROLATUM,ZINC OXIDE: 57; 17 PASTE TOPICAL at 22:05

## 2024-03-17 ASSESSMENT — PAIN DESCRIPTION - ONSET: ONSET: ON-GOING

## 2024-03-17 ASSESSMENT — PAIN DESCRIPTION - DESCRIPTORS
DESCRIPTORS: ACHING
DESCRIPTORS: ACHING

## 2024-03-17 ASSESSMENT — PAIN SCALES - GENERAL
PAINLEVEL_OUTOF10: 3
PAINLEVEL_OUTOF10: 8
PAINLEVEL_OUTOF10: 0

## 2024-03-17 ASSESSMENT — PAIN - FUNCTIONAL ASSESSMENT: PAIN_FUNCTIONAL_ASSESSMENT: PREVENTS OR INTERFERES SOME ACTIVE ACTIVITIES AND ADLS

## 2024-03-17 ASSESSMENT — PAIN DESCRIPTION - PAIN TYPE: TYPE: CHRONIC PAIN

## 2024-03-17 ASSESSMENT — PAIN DESCRIPTION - FREQUENCY: FREQUENCY: CONTINUOUS

## 2024-03-17 ASSESSMENT — PAIN DESCRIPTION - ORIENTATION: ORIENTATION: POSTERIOR

## 2024-03-17 ASSESSMENT — PAIN DESCRIPTION - LOCATION
LOCATION: BACK;NECK
LOCATION: BACK;NECK

## 2024-03-18 ENCOUNTER — HOSPITAL ENCOUNTER (INPATIENT)
Facility: HOSPITAL | Age: 54
Discharge: HOME | DRG: 426 | End: 2024-03-21
Attending: INTERNAL MEDICINE
Payer: COMMERCIAL

## 2024-03-18 LAB
ANION GAP SERPL CALC-SCNC: 2 MMOL/L (ref 5–15)
ANION GAP SERPL CALC-SCNC: 3 MMOL/L (ref 5–15)
APTT PPP: 71.6 SEC (ref 21.2–34.1)
APTT PPP: 92.7 SEC (ref 21.2–34.1)
APTT PPP: 96.3 SEC (ref 21.2–34.1)
BUN SERPL-MCNC: 3 MG/DL (ref 6–20)
BUN SERPL-MCNC: 3 MG/DL (ref 6–20)
BUN/CREAT SERPL: 13 (ref 12–20)
BUN/CREAT SERPL: 9 (ref 12–20)
CA-I BLD-MCNC: 8 MG/DL (ref 8.5–10.1)
CA-I BLD-MCNC: 8.2 MG/DL (ref 8.5–10.1)
CHLORIDE SERPL-SCNC: 92 MMOL/L (ref 97–108)
CHLORIDE SERPL-SCNC: 92 MMOL/L (ref 97–108)
CO2 SERPL-SCNC: 33 MMOL/L (ref 21–32)
CO2 SERPL-SCNC: 35 MMOL/L (ref 21–32)
CREAT SERPL-MCNC: 0.23 MG/DL (ref 0.7–1.3)
CREAT SERPL-MCNC: 0.34 MG/DL (ref 0.7–1.3)
ERYTHROCYTE [DISTWIDTH] IN BLOOD BY AUTOMATED COUNT: 16.3 % (ref 11.5–14.5)
GLUCOSE SERPL-MCNC: 149 MG/DL (ref 65–100)
GLUCOSE SERPL-MCNC: 94 MG/DL (ref 65–100)
HCT VFR BLD AUTO: 24.5 % (ref 36.6–50.3)
HGB BLD-MCNC: 7.9 G/DL (ref 12.1–17)
MCH RBC QN AUTO: 31.7 PG (ref 26–34)
MCHC RBC AUTO-ENTMCNC: 32.2 G/DL (ref 30–36.5)
MCV RBC AUTO: 98.4 FL (ref 80–99)
NRBC # BLD: 0 K/UL (ref 0–0.01)
NRBC BLD-RTO: 0 PER 100 WBC
PLATELET # BLD AUTO: 132 K/UL (ref 150–400)
PMV BLD AUTO: 9.1 FL (ref 8.9–12.9)
POTASSIUM SERPL-SCNC: 3.2 MMOL/L (ref 3.5–5.1)
POTASSIUM SERPL-SCNC: 3.2 MMOL/L (ref 3.5–5.1)
RBC # BLD AUTO: 2.49 M/UL (ref 4.1–5.7)
SODIUM SERPL-SCNC: 128 MMOL/L (ref 136–145)
SODIUM SERPL-SCNC: 129 MMOL/L (ref 136–145)
THERAPEUTIC RANGE: ABNORMAL SEC (ref 82–109)
WBC # BLD AUTO: 4.8 K/UL (ref 4.1–11.1)

## 2024-03-18 PROCEDURE — 6360000002 HC RX W HCPCS: Performed by: INTERNAL MEDICINE

## 2024-03-18 PROCEDURE — 6370000000 HC RX 637 (ALT 250 FOR IP): Performed by: INTERNAL MEDICINE

## 2024-03-18 PROCEDURE — 6370000000 HC RX 637 (ALT 250 FOR IP): Performed by: STUDENT IN AN ORGANIZED HEALTH CARE EDUCATION/TRAINING PROGRAM

## 2024-03-18 PROCEDURE — 94640 AIRWAY INHALATION TREATMENT: CPT

## 2024-03-18 PROCEDURE — 97530 THERAPEUTIC ACTIVITIES: CPT

## 2024-03-18 PROCEDURE — 2700000000 HC OXYGEN THERAPY PER DAY

## 2024-03-18 PROCEDURE — 36415 COLL VENOUS BLD VENIPUNCTURE: CPT

## 2024-03-18 PROCEDURE — 97165 OT EVAL LOW COMPLEX 30 MIN: CPT

## 2024-03-18 PROCEDURE — 6360000002 HC RX W HCPCS: Performed by: STUDENT IN AN ORGANIZED HEALTH CARE EDUCATION/TRAINING PROGRAM

## 2024-03-18 PROCEDURE — 2580000003 HC RX 258: Performed by: STUDENT IN AN ORGANIZED HEALTH CARE EDUCATION/TRAINING PROGRAM

## 2024-03-18 PROCEDURE — 76705 ECHO EXAM OF ABDOMEN: CPT

## 2024-03-18 PROCEDURE — 85027 COMPLETE CBC AUTOMATED: CPT

## 2024-03-18 PROCEDURE — 1100000000 HC RM PRIVATE

## 2024-03-18 PROCEDURE — 2580000003 HC RX 258: Performed by: INTERNAL MEDICINE

## 2024-03-18 PROCEDURE — 85730 THROMBOPLASTIN TIME PARTIAL: CPT

## 2024-03-18 PROCEDURE — 80048 BASIC METABOLIC PNL TOTAL CA: CPT

## 2024-03-18 PROCEDURE — 94761 N-INVAS EAR/PLS OXIMETRY MLT: CPT

## 2024-03-18 PROCEDURE — 97162 PT EVAL MOD COMPLEX 30 MIN: CPT

## 2024-03-18 PROCEDURE — 2500000003 HC RX 250 WO HCPCS: Performed by: STUDENT IN AN ORGANIZED HEALTH CARE EDUCATION/TRAINING PROGRAM

## 2024-03-18 RX ORDER — SODIUM CHLORIDE AND POTASSIUM CHLORIDE 150; 900 MG/100ML; MG/100ML
INJECTION, SOLUTION INTRAVENOUS CONTINUOUS
Status: DISCONTINUED | OUTPATIENT
Start: 2024-03-18 | End: 2024-03-20

## 2024-03-18 RX ORDER — SODIUM CHLORIDE 1 G/1
1 TABLET ORAL EVERY 6 HOURS
Status: COMPLETED | OUTPATIENT
Start: 2024-03-18 | End: 2024-03-19

## 2024-03-18 RX ORDER — POTASSIUM CHLORIDE 7.45 MG/ML
10 INJECTION INTRAVENOUS
Status: COMPLETED | OUTPATIENT
Start: 2024-03-18 | End: 2024-03-18

## 2024-03-18 RX ADMIN — THERA TABS 1 TABLET: TAB at 10:19

## 2024-03-18 RX ADMIN — HEPARIN SODIUM AND DEXTROSE 18 UNITS/KG/HR: 10000; 5 INJECTION INTRAVENOUS at 15:29

## 2024-03-18 RX ADMIN — WHITE PETROLATUM,ZINC OXIDE: 57; 17 PASTE TOPICAL at 21:38

## 2024-03-18 RX ADMIN — Medication 1 G: at 18:41

## 2024-03-18 RX ADMIN — POTASSIUM CHLORIDE 10 MEQ: 7.46 INJECTION, SOLUTION INTRAVENOUS at 19:44

## 2024-03-18 RX ADMIN — THIAMINE HCL TAB 100 MG 100 MG: 100 TAB at 10:19

## 2024-03-18 RX ADMIN — IPRATROPIUM BROMIDE AND ALBUTEROL SULFATE 1 DOSE: 2.5; .5 SOLUTION RESPIRATORY (INHALATION) at 13:18

## 2024-03-18 RX ADMIN — Medication 2 G: at 10:19

## 2024-03-18 RX ADMIN — WHITE PETROLATUM,ZINC OXIDE: 57; 17 PASTE TOPICAL at 14:15

## 2024-03-18 RX ADMIN — SODIUM CHLORIDE, PRESERVATIVE FREE 10 ML: 5 INJECTION INTRAVENOUS at 10:23

## 2024-03-18 RX ADMIN — IPRATROPIUM BROMIDE AND ALBUTEROL SULFATE 1 DOSE: 2.5; .5 SOLUTION RESPIRATORY (INHALATION) at 22:11

## 2024-03-18 RX ADMIN — METOPROLOL TARTRATE 25 MG: 25 TABLET, FILM COATED ORAL at 21:31

## 2024-03-18 RX ADMIN — POTASSIUM CHLORIDE 10 MEQ: 7.46 INJECTION, SOLUTION INTRAVENOUS at 10:19

## 2024-03-18 RX ADMIN — HEPARIN SODIUM 2000 UNITS: 1000 INJECTION INTRAVENOUS; SUBCUTANEOUS at 15:27

## 2024-03-18 RX ADMIN — WHITE PETROLATUM,ZINC OXIDE: 57; 17 PASTE TOPICAL at 10:23

## 2024-03-18 RX ADMIN — Medication 2 PUFF: at 22:14

## 2024-03-18 RX ADMIN — POTASSIUM CHLORIDE 10 MEQ: 7.46 INJECTION, SOLUTION INTRAVENOUS at 18:40

## 2024-03-18 RX ADMIN — ACETAMINOPHEN 650 MG: 325 TABLET ORAL at 21:31

## 2024-03-18 RX ADMIN — Medication 2 G: at 17:20

## 2024-03-18 RX ADMIN — SODIUM CHLORIDE, PRESERVATIVE FREE 10 ML: 5 INJECTION INTRAVENOUS at 21:31

## 2024-03-18 RX ADMIN — HEPARIN SODIUM AND DEXTROSE 16 UNITS/KG/HR: 10000; 5 INJECTION INTRAVENOUS at 10:15

## 2024-03-18 RX ADMIN — SODIUM CHLORIDE, PRESERVATIVE FREE 10 ML: 5 INJECTION INTRAVENOUS at 21:32

## 2024-03-18 RX ADMIN — POTASSIUM CHLORIDE AND SODIUM CHLORIDE: 900; 150 INJECTION, SOLUTION INTRAVENOUS at 18:40

## 2024-03-18 RX ADMIN — Medication 2 PUFF: at 08:32

## 2024-03-18 RX ADMIN — METOPROLOL TARTRATE 25 MG: 25 TABLET, FILM COATED ORAL at 10:19

## 2024-03-18 RX ADMIN — IPRATROPIUM BROMIDE AND ALBUTEROL SULFATE 1 DOSE: 2.5; .5 SOLUTION RESPIRATORY (INHALATION) at 08:32

## 2024-03-18 RX ADMIN — TRAZODONE HYDROCHLORIDE 50 MG: 50 TABLET ORAL at 21:31

## 2024-03-18 RX ADMIN — SODIUM CHLORIDE, PRESERVATIVE FREE 10 ML: 5 INJECTION INTRAVENOUS at 10:22

## 2024-03-18 NOTE — CARE COORDINATION
IDR 24HRS.  Pending MBS.  EGD postponed until 19 MAR 24. Curently on 2L NC.  Home O2 TBD.    Therapy recommendation is for HH.  Met w/patient and informed him of therapy recommendation.  Patient in agreement and gave verbal consent for referrals to be sent out on his behalf.  Referrals sent pending acceptance.       MAX Salguero, MSW  [phone]

## 2024-03-19 ENCOUNTER — APPOINTMENT (OUTPATIENT)
Facility: HOSPITAL | Age: 54
DRG: 426 | End: 2024-03-19
Payer: COMMERCIAL

## 2024-03-19 ENCOUNTER — ANESTHESIA (OUTPATIENT)
Facility: HOSPITAL | Age: 54
DRG: 426 | End: 2024-03-19
Payer: COMMERCIAL

## 2024-03-19 ENCOUNTER — ANESTHESIA EVENT (OUTPATIENT)
Facility: HOSPITAL | Age: 54
DRG: 426 | End: 2024-03-19
Payer: COMMERCIAL

## 2024-03-19 LAB
ALBUMIN SERPL-MCNC: 2 G/DL (ref 3.5–5)
ANION GAP SERPL CALC-SCNC: 2 MMOL/L (ref 5–15)
APTT PPP: 51 SEC (ref 21.2–34.1)
APTT PPP: 98.3 SEC (ref 21.2–34.1)
BUN SERPL-MCNC: 3 MG/DL (ref 6–20)
BUN/CREAT SERPL: 10 (ref 12–20)
CA-I BLD-MCNC: 7.9 MG/DL (ref 8.5–10.1)
CHLORIDE SERPL-SCNC: 97 MMOL/L (ref 97–108)
CO2 SERPL-SCNC: 32 MMOL/L (ref 21–32)
CREAT SERPL-MCNC: 0.3 MG/DL (ref 0.7–1.3)
ERYTHROCYTE [DISTWIDTH] IN BLOOD BY AUTOMATED COUNT: 16.4 % (ref 11.5–14.5)
GLUCOSE SERPL-MCNC: 95 MG/DL (ref 65–100)
HCT VFR BLD AUTO: 24.7 % (ref 36.6–50.3)
HGB BLD-MCNC: 8.2 G/DL (ref 12.1–17)
MAGNESIUM SERPL-MCNC: 1.2 MG/DL (ref 1.6–2.4)
MCH RBC QN AUTO: 31.8 PG (ref 26–34)
MCHC RBC AUTO-ENTMCNC: 33.2 G/DL (ref 30–36.5)
MCV RBC AUTO: 95.7 FL (ref 80–99)
NRBC # BLD: 0 K/UL (ref 0–0.01)
NRBC BLD-RTO: 0 PER 100 WBC
PHOSPHATE SERPL-MCNC: 2.3 MG/DL (ref 2.6–4.7)
PLATELET # BLD AUTO: 132 K/UL (ref 150–400)
PMV BLD AUTO: 9.3 FL (ref 8.9–12.9)
POTASSIUM SERPL-SCNC: 3.7 MMOL/L (ref 3.5–5.1)
RBC # BLD AUTO: 2.58 M/UL (ref 4.1–5.7)
SODIUM SERPL-SCNC: 131 MMOL/L (ref 136–145)
THERAPEUTIC RANGE: ABNORMAL SEC (ref 82–109)
THERAPEUTIC RANGE: ABNORMAL SEC (ref 82–109)
WBC # BLD AUTO: 5.6 K/UL (ref 4.1–11.1)

## 2024-03-19 PROCEDURE — 6370000000 HC RX 637 (ALT 250 FOR IP): Performed by: STUDENT IN AN ORGANIZED HEALTH CARE EDUCATION/TRAINING PROGRAM

## 2024-03-19 PROCEDURE — 0DJ08ZZ INSPECTION OF UPPER INTESTINAL TRACT, VIA NATURAL OR ARTIFICIAL OPENING ENDOSCOPIC: ICD-10-PCS | Performed by: INTERNAL MEDICINE

## 2024-03-19 PROCEDURE — 36415 COLL VENOUS BLD VENIPUNCTURE: CPT

## 2024-03-19 PROCEDURE — 7100000011 HC PHASE II RECOVERY - ADDTL 15 MIN: Performed by: INTERNAL MEDICINE

## 2024-03-19 PROCEDURE — 85027 COMPLETE CBC AUTOMATED: CPT

## 2024-03-19 PROCEDURE — 1100000000 HC RM PRIVATE

## 2024-03-19 PROCEDURE — 3600007502: Performed by: INTERNAL MEDICINE

## 2024-03-19 PROCEDURE — 6360000002 HC RX W HCPCS: Performed by: NURSE ANESTHETIST, CERTIFIED REGISTERED

## 2024-03-19 PROCEDURE — 6370000000 HC RX 637 (ALT 250 FOR IP): Performed by: INTERNAL MEDICINE

## 2024-03-19 PROCEDURE — 6360000002 HC RX W HCPCS: Performed by: INTERNAL MEDICINE

## 2024-03-19 PROCEDURE — 99223 1ST HOSP IP/OBS HIGH 75: CPT | Performed by: STUDENT IN AN ORGANIZED HEALTH CARE EDUCATION/TRAINING PROGRAM

## 2024-03-19 PROCEDURE — 2700000000 HC OXYGEN THERAPY PER DAY

## 2024-03-19 PROCEDURE — 3600007512: Performed by: INTERNAL MEDICINE

## 2024-03-19 PROCEDURE — 2500000003 HC RX 250 WO HCPCS: Performed by: NURSE ANESTHETIST, CERTIFIED REGISTERED

## 2024-03-19 PROCEDURE — 80069 RENAL FUNCTION PANEL: CPT

## 2024-03-19 PROCEDURE — 2580000003 HC RX 258: Performed by: STUDENT IN AN ORGANIZED HEALTH CARE EDUCATION/TRAINING PROGRAM

## 2024-03-19 PROCEDURE — 7100000010 HC PHASE II RECOVERY - FIRST 15 MIN: Performed by: INTERNAL MEDICINE

## 2024-03-19 PROCEDURE — 6360000002 HC RX W HCPCS: Performed by: STUDENT IN AN ORGANIZED HEALTH CARE EDUCATION/TRAINING PROGRAM

## 2024-03-19 PROCEDURE — 94760 N-INVAS EAR/PLS OXIMETRY 1: CPT

## 2024-03-19 PROCEDURE — 2580000003 HC RX 258: Performed by: NURSE ANESTHETIST, CERTIFIED REGISTERED

## 2024-03-19 PROCEDURE — 85730 THROMBOPLASTIN TIME PARTIAL: CPT

## 2024-03-19 PROCEDURE — 3700000001 HC ADD 15 MINUTES (ANESTHESIA): Performed by: INTERNAL MEDICINE

## 2024-03-19 PROCEDURE — 2580000003 HC RX 258: Performed by: INTERNAL MEDICINE

## 2024-03-19 PROCEDURE — 94640 AIRWAY INHALATION TREATMENT: CPT

## 2024-03-19 PROCEDURE — 3700000000 HC ANESTHESIA ATTENDED CARE: Performed by: INTERNAL MEDICINE

## 2024-03-19 PROCEDURE — 2500000003 HC RX 250 WO HCPCS: Performed by: INTERNAL MEDICINE

## 2024-03-19 PROCEDURE — 83735 ASSAY OF MAGNESIUM: CPT

## 2024-03-19 RX ORDER — SODIUM CHLORIDE 1 G/1
1 TABLET ORAL EVERY 6 HOURS
Status: DISCONTINUED | OUTPATIENT
Start: 2024-03-19 | End: 2024-03-19

## 2024-03-19 RX ORDER — SODIUM CHLORIDE, SODIUM LACTATE, POTASSIUM CHLORIDE, CALCIUM CHLORIDE 600; 310; 30; 20 MG/100ML; MG/100ML; MG/100ML; MG/100ML
INJECTION, SOLUTION INTRAVENOUS CONTINUOUS PRN
Status: DISCONTINUED | OUTPATIENT
Start: 2024-03-19 | End: 2024-03-19 | Stop reason: SDUPTHER

## 2024-03-19 RX ORDER — HEPARIN SODIUM 1000 [USP'U]/ML
4000 INJECTION, SOLUTION INTRAVENOUS; SUBCUTANEOUS PRN
Status: DISCONTINUED | OUTPATIENT
Start: 2024-03-19 | End: 2024-03-19

## 2024-03-19 RX ORDER — SODIUM CHLORIDE 1 G/1
1 TABLET ORAL EVERY 6 HOURS
Status: COMPLETED | OUTPATIENT
Start: 2024-03-19 | End: 2024-03-20

## 2024-03-19 RX ORDER — MAGNESIUM SULFATE HEPTAHYDRATE 40 MG/ML
2000 INJECTION, SOLUTION INTRAVENOUS ONCE
Status: COMPLETED | OUTPATIENT
Start: 2024-03-19 | End: 2024-03-19

## 2024-03-19 RX ORDER — LIDOCAINE HYDROCHLORIDE 20 MG/ML
INJECTION, SOLUTION EPIDURAL; INFILTRATION; INTRACAUDAL; PERINEURAL PRN
Status: DISCONTINUED | OUTPATIENT
Start: 2024-03-19 | End: 2024-03-19 | Stop reason: SDUPTHER

## 2024-03-19 RX ORDER — PANTOPRAZOLE SODIUM 40 MG/1
40 TABLET, DELAYED RELEASE ORAL
Status: DISCONTINUED | OUTPATIENT
Start: 2024-03-19 | End: 2024-03-21 | Stop reason: HOSPADM

## 2024-03-19 RX ORDER — PROPOFOL 10 MG/ML
INJECTION, EMULSION INTRAVENOUS
Status: COMPLETED
Start: 2024-03-19 | End: 2024-03-19

## 2024-03-19 RX ORDER — HEPARIN SODIUM 1000 [USP'U]/ML
2000 INJECTION, SOLUTION INTRAVENOUS; SUBCUTANEOUS PRN
Status: DISCONTINUED | OUTPATIENT
Start: 2024-03-19 | End: 2024-03-19

## 2024-03-19 RX ORDER — LIDOCAINE HYDROCHLORIDE 20 MG/ML
INJECTION, SOLUTION EPIDURAL; INFILTRATION; INTRACAUDAL; PERINEURAL
Status: COMPLETED
Start: 2024-03-19 | End: 2024-03-19

## 2024-03-19 RX ORDER — GLYCOPYRROLATE 0.2 MG/ML
INJECTION INTRAMUSCULAR; INTRAVENOUS PRN
Status: DISCONTINUED | OUTPATIENT
Start: 2024-03-19 | End: 2024-03-19 | Stop reason: SDUPTHER

## 2024-03-19 RX ORDER — GLYCOPYRROLATE 0.2 MG/ML
INJECTION INTRAMUSCULAR; INTRAVENOUS
Status: COMPLETED
Start: 2024-03-19 | End: 2024-03-19

## 2024-03-19 RX ADMIN — SODIUM CHLORIDE, PRESERVATIVE FREE 10 ML: 5 INJECTION INTRAVENOUS at 20:51

## 2024-03-19 RX ADMIN — Medication 1 G: at 01:06

## 2024-03-19 RX ADMIN — SODIUM CHLORIDE, PRESERVATIVE FREE 10 ML: 5 INJECTION INTRAVENOUS at 10:21

## 2024-03-19 RX ADMIN — PANTOPRAZOLE SODIUM 40 MG: 40 TABLET, DELAYED RELEASE ORAL at 17:18

## 2024-03-19 RX ADMIN — TRAZODONE HYDROCHLORIDE 50 MG: 50 TABLET ORAL at 20:51

## 2024-03-19 RX ADMIN — POTASSIUM CHLORIDE AND SODIUM CHLORIDE: 900; 150 INJECTION, SOLUTION INTRAVENOUS at 05:26

## 2024-03-19 RX ADMIN — SODIUM CHLORIDE, PRESERVATIVE FREE 10 ML: 5 INJECTION INTRAVENOUS at 10:19

## 2024-03-19 RX ADMIN — WHITE PETROLATUM,ZINC OXIDE: 57; 17 PASTE TOPICAL at 15:09

## 2024-03-19 RX ADMIN — LIDOCAINE HYDROCHLORIDE 100 MG: 20 INJECTION, SOLUTION EPIDURAL; INFILTRATION; INTRACAUDAL; PERINEURAL at 08:46

## 2024-03-19 RX ADMIN — HEPARIN SODIUM AND DEXTROSE 18 UNITS/KG/HR: 10000; 5 INJECTION INTRAVENOUS at 00:48

## 2024-03-19 RX ADMIN — Medication 1 G: at 12:49

## 2024-03-19 RX ADMIN — IPRATROPIUM BROMIDE AND ALBUTEROL SULFATE 1 DOSE: 2.5; .5 SOLUTION RESPIRATORY (INHALATION) at 10:07

## 2024-03-19 RX ADMIN — METOPROLOL TARTRATE 25 MG: 25 TABLET, FILM COATED ORAL at 10:16

## 2024-03-19 RX ADMIN — Medication 1 G: at 18:50

## 2024-03-19 RX ADMIN — SODIUM CHLORIDE, POTASSIUM CHLORIDE, SODIUM LACTATE AND CALCIUM CHLORIDE: 600; 310; 30; 20 INJECTION, SOLUTION INTRAVENOUS at 08:31

## 2024-03-19 RX ADMIN — METOPROLOL TARTRATE 25 MG: 25 TABLET, FILM COATED ORAL at 20:51

## 2024-03-19 RX ADMIN — POTASSIUM CHLORIDE AND SODIUM CHLORIDE: 900; 150 INJECTION, SOLUTION INTRAVENOUS at 10:21

## 2024-03-19 RX ADMIN — PROPOFOL 50 MG: 10 INJECTION, EMULSION INTRAVENOUS at 08:46

## 2024-03-19 RX ADMIN — IPRATROPIUM BROMIDE AND ALBUTEROL SULFATE 1 DOSE: 2.5; .5 SOLUTION RESPIRATORY (INHALATION) at 13:44

## 2024-03-19 RX ADMIN — GLYCOPYRROLATE 0.1 MG: 0.2 INJECTION, SOLUTION INTRAMUSCULAR; INTRAVENOUS at 08:36

## 2024-03-19 RX ADMIN — MAGNESIUM SULFATE HEPTAHYDRATE 2000 MG: 40 INJECTION, SOLUTION INTRAVENOUS at 17:18

## 2024-03-19 RX ADMIN — IPRATROPIUM BROMIDE AND ALBUTEROL SULFATE 1 DOSE: 2.5; .5 SOLUTION RESPIRATORY (INHALATION) at 20:54

## 2024-03-19 RX ADMIN — Medication 2 PUFF: at 10:08

## 2024-03-19 RX ADMIN — Medication 1 G: at 06:26

## 2024-03-19 RX ADMIN — WHITE PETROLATUM,ZINC OXIDE: 57; 17 PASTE TOPICAL at 10:22

## 2024-03-19 RX ADMIN — POTASSIUM CHLORIDE AND SODIUM CHLORIDE: 900; 150 INJECTION, SOLUTION INTRAVENOUS at 21:16

## 2024-03-19 RX ADMIN — Medication 2 PUFF: at 20:54

## 2024-03-19 RX ADMIN — WHITE PETROLATUM,ZINC OXIDE: 57; 17 PASTE TOPICAL at 20:52

## 2024-03-19 RX ADMIN — PROPOFOL 50 MG: 10 INJECTION, EMULSION INTRAVENOUS at 08:53

## 2024-03-19 ASSESSMENT — COPD QUESTIONNAIRES: CAT_SEVERITY: MODERATE

## 2024-03-19 NOTE — ANESTHESIA PRE PROCEDURE
Department of Anesthesiology  Preprocedure Note       Name:  Oumar Merida III   Age:  54 y.o.  :  1970                                          MRN:  [id number]         Date:  3/19/2024      Surgeon: Surgeon(s):  Adriana Kennedy MD    Procedure: Procedure(s):  ESOPHAGOGASTRODUODENOSCOPY    Medications prior to admission:   Prior to Admission medications    Medication Sig Start Date End Date Taking? Authorizing Provider   amoxicillin-clavulanate (AUGMENTIN) 875-125 MG per tablet Take 1 tablet by mouth 2 times daily   Yes Oumou Marie MD   lisinopril (PRINIVIL;ZESTRIL) 20 MG tablet Take 1 tablet by mouth daily   Yes Oumou Marie MD   venlafaxine (EFFEXOR) 75 MG tablet Take 1 tablet by mouth nightly   Yes Oumou Marie MD   sodium bicarbonate 650 MG tablet Take 0.5 tablets by mouth 2 times daily   Yes Oumou Marie MD   traZODone (DESYREL) 50 MG tablet Take 1 tablet by mouth nightly as needed for Sleep   Yes Oumou Marie MD   ibuprofen (ADVIL;MOTRIN) 800 MG tablet Take 1 tablet by mouth every 8 hours as needed for Pain   Yes Oumou Marie MD   SPIRIVA RESPIMAT 2.5 MCG/ACT AERS inhaler Inhale 2 puffs into the lungs daily 24   Oumou Marie MD   SYMBICORT 80-4.5 MCG/ACT AERO Inhale 2 puffs into the lungs in the morning and 2 puffs in the evening. 23   Oumou Marie MD   mirtazapine (REMERON) 15 MG tablet Take 1 tablet by mouth nightly at bedtime. 24   Oumou Marie MD   Adalimumab (HUMIRA PEN) 40 MG/0.4ML PNKT Inject 40 mg into the skin every 14 days 3/28/22   Automatic Reconciliation, Ar   albuterol sulfate HFA (PROVENTIL;VENTOLIN;PROAIR) 108 (90 Base) MCG/ACT inhaler Inhale 2 puffs into the lungs every 4 hours as needed 3/15/22   Automatic Reconciliation, Ar   thiamine 100 MG tablet Take 1 tablet by mouth daily 22   Automatic Reconciliation, Ar       Current medications:    Current Facility-Administered

## 2024-03-19 NOTE — CONSULTS
Comprehensive Nutrition Assessment    Type and Reason for Visit:  Initial, Consult    Nutrition Recommendations/Plan:   NPO, advance as medically appropriate.  Consider increasing rate of IVF+D5 to 125 mL/hr for protein sparing kcals and risk of refeeding.  Continue MVIs, B1 supplementation  Monitor and document intakes, weights, BM in I/Os.     Malnutrition Assessment:  Malnutrition Status:  Severe malnutrition (03/13/24 1422)    Context:  Chronic Illness     Findings of the 6 clinical characteristics of malnutrition:  Energy Intake:  75% or less estimated energy requirements for 1 month or longer  Weight Loss:  Greater than 5% over 1 month     Body Fat Loss:  No significant body fat loss     Muscle Mass Loss:  No significant muscle mass loss    Fluid Accumulation:  No significant fluid accumulation     Strength:  Not Performed    Nutrition Assessment:    Admitted for hyponatremia syndrome, AMS. RD consulted for poor appetite/intakes, also screened for MST 2 d/t noted wt loss and poor intakes PTA. P inappropriate for interview at visit x2 attempt, asleep w/ difficulty arousing likely d/t ativan given earlier this a.m. Pt NPO since admission to ICU floor(1 d). Per MD's note, concern for malnutrition and desiring ONS. Per chart review, family endorsed Pt w/ oropharyngeal dysphagia >1 yr, scheduled for EGD on 3/15 in OP w/ GI MD. PAP=784# x1 mth(6% wt loss x1mth), 167# x6 mths. Advance diet as medically appropriate. Labs: Na 115, Cl 81, , BUN 5, Cr 0.17, Ca 7.6. Meds: D5 at 50 mL/hr, MVI, B1, ativan, duoneb.    Nutrition Related Findings:    NFPE inappropriate, observed as well nourished, no obvious wasting. No N/V/D/C reported. +oropharyngeal dysphagia per GI MD note 1 mth ago NPO, SLP eval pending. Last BM PTA. No edema. Wound Type: None, Wound Consult Pending       Current Nutrition Intake & Therapies:    Average Meal Intake: NPO  Average Supplements Intake: NPO  Diet NPO    Anthropometric 
Subjective:   Oumar Merida III   54 y.o.   1970     Otolaryngology Consult Note:  Reason for consult: Dysphagia    History of Present Illness:  Oumar Merida III is a 54 y.o. male with past medical history of COPD, HTN, hyponatremia, with dysphagia.    Patient has been having dysphagia for greater than 1 year.  Was previously evaluated by Dr. Harris on 9/14/2023.  Scope exam and CT scan at that time was consistent with cervical osteophytes likely contributing to dysphagia. Patient was following with rheumatology and orthospine for ankylosing spondylitis at that time.  Was referred to GI for evaluation and barium swallow was considered.    Patient now admitted for dysphagia resulting in severe hyponatremia.  Patient's hyponatremia now corrected EEG was concerning for a posterior pharyngeal wall ulcer.    Review of Systems  Consitutional: denies fever, excessive weight gain or loss.  Eyes: denies diplopia, eye pain.  Integumentary: denies new concerning skin lesions.  Ears, Nose, Mouth, Throat: denies except as per HPI.  Endocrine: denies hot or cold intolerance, increased thirst.  Respiratory: denies cough, hemoptysis, wheezing  Gastrointestinal: denies trouble swallowing, nausea, emesis, regurgitation  Musculoskeletal: denies muscle weakness or wasting  Cardiovascular: denies chest pain, shortness of breath  Neurologic: denies seizures, numbness or tingling, syncope  Hematologic: denies easy bleeding or bruising       Past Medical History:   Diagnosis Date    Ankylosing spondylitis (HCC)     COPD (chronic obstructive pulmonary disease) (HCC)     Hypertension     Hyponatremia     SIADH (syndrome of inappropriate ADH production) (HCC)      Past Surgical History:   Procedure Laterality Date    CERVICAL LAMINECTOMY      C6 laminectomy    HIP ARTHROPLASTY      left hip    SPINAL FUSION      cervico thoracic fusion C2-T4    UPPER GASTROINTESTINAL ENDOSCOPY N/A 3/19/2024    ESOPHAGOGASTRODUODENOSCOPY 
  psychiatric: Unable to assess      LAB DATA REVIEWED:      Recent Labs     03/12/24  1215   WBC 14.5*   HGB 10.9*   HCT 29.7*        Recent Labs     03/12/24  1408 03/12/24  1405 03/12/24  1215   NA  --   --  108*   K  --   --  5.0   CL  --   --  73*   CO2  --   --  25   BUN  --   --  6   CREATININE  --  0.35* 0.30*   ALT  --   --  20   INR 1.0  --   --      Invalid input(s): \"GLPOC\"  No results for input(s): \"PH\", \"PCO2\", \"PO2\", \"HCO3\", \"FIO2\" in the last 720 hours.  Recent Labs     03/12/24  1408   INR 1.0       Recommendations/Plan:     1.Severe hyponatremia  -Sodium was 108 on admission.  106 on repeat check  -Symptomatic.  He is lethargic  -Acute on chronic  -I will start him on hypertonic saline at 60 mL/h .repeat labs in 2 hours and make adjustments as needed  -Goal to correct rate by not more than 0.5 meq per hour  -Check urine sodium and urine osmolality stat.  -Renal function is normal  -Review of previous urine measurements had shown urine sodium of only 10 and urine osmolality of 141 ,both suggestive of beer proteinemia.  Alcohol level on this admission is < 10  -Check BMP every 4 hours    2.  Altered mental status  -Likely secondary to hyponatremia  -CT head was ordered but patient refused  -He was also noted to be on trazodone on his home medications  -Will recommend admitting to ICU with neurochecks every hour  -Check urine analysis to rule out UTI.  He also has elevated WBC count    3.  Hyperkalemia  -Potassium was 5.7 on point-of-care check.  5.0 on BMP  -He was noted to be on lisinopril at home which I will hold  -Will give him Lokelma if he is awake to take it.  If not we will treat with insulin and D50    4.  Hypertension  -Blood pressure is well-controlled.  Continue to hold his meds for now    Thank you for the consultation.            ________________________________________________________________________  Signed: Jesenia Cha MD  
caution while reading the report due to this  inherent and potential for grammatical mistakes.    This physician  works as a inpatient consult gastroenterologist only, any result not available at time of inpatient discharge and/or clinical follow-up should be managed by the primary care physician or patient's primary gastroenterologist.  It is responsibility of hospitalitis/admitting physician to forward the discharge summary to patient's primary care physician and the primary gastroenterologist regarding further follow-up plan after patient be discharged.    note to patient:  The 21 st century Cures Act make the medical notes like this available to patient in the interest of transparency, however please be advised this is a medical document.  It is intended  as peer to peer communication. It is written in the medical language and may contain abbreviation or verbiage that are unfamiliar. It may appear blunt or direct.  Medical documents are intended to carry relevant information, facts as evident.  And the clinic opinions of the practitioner.  This note maybe transcribed  using a voice dictation system, voice-recognition errors may occur, this should not be taken to alter the contents or meaning for this note.      Cc Referring Physician

## 2024-03-19 NOTE — ANESTHESIA PRE PROCEDURE
Department of Anesthesiology  Preprocedure Note       Name:  Oumar Merida III   Age:  54 y.o.  :  1970                                          MRN:  [id number]         Date:  3/19/2024      Surgeon: Surgeon(s):  Adriana Kennedy MD    Procedure: Procedure(s):  ESOPHAGOGASTRODUODENOSCOPY    Medications prior to admission:   Prior to Admission medications    Medication Sig Start Date End Date Taking? Authorizing Provider   amoxicillin-clavulanate (AUGMENTIN) 875-125 MG per tablet Take 1 tablet by mouth 2 times daily   Yes Oumou Marie MD   lisinopril (PRINIVIL;ZESTRIL) 20 MG tablet Take 1 tablet by mouth daily   Yes Oumou Marie MD   venlafaxine (EFFEXOR) 75 MG tablet Take 1 tablet by mouth nightly   Yes Oumou Marie MD   sodium bicarbonate 650 MG tablet Take 0.5 tablets by mouth 2 times daily   Yes Oumou Marie MD   traZODone (DESYREL) 50 MG tablet Take 1 tablet by mouth nightly as needed for Sleep   Yes Oumou Marie MD   ibuprofen (ADVIL;MOTRIN) 800 MG tablet Take 1 tablet by mouth every 8 hours as needed for Pain   Yes Oumou Marie MD   SPIRIVA RESPIMAT 2.5 MCG/ACT AERS inhaler Inhale 2 puffs into the lungs daily 24   Oumou Marie MD   SYMBICORT 80-4.5 MCG/ACT AERO Inhale 2 puffs into the lungs in the morning and 2 puffs in the evening. 23   Oumou Marie MD   mirtazapine (REMERON) 15 MG tablet Take 1 tablet by mouth nightly at bedtime. 24   Oumou Marie MD   Adalimumab (HUMIRA PEN) 40 MG/0.4ML PNKT Inject 40 mg into the skin every 14 days 3/28/22   Automatic Reconciliation, Ar   albuterol sulfate HFA (PROVENTIL;VENTOLIN;PROAIR) 108 (90 Base) MCG/ACT inhaler Inhale 2 puffs into the lungs every 4 hours as needed 3/15/22   Automatic Reconciliation, Ar   thiamine 100 MG tablet Take 1 tablet by mouth daily 22   Automatic Reconciliation, Ar       Current medications:    Current Facility-Administered

## 2024-03-19 NOTE — ANESTHESIA POSTPROCEDURE EVALUATION
Department of Anesthesiology  Postprocedure Note    Patient: Oumar Merida III  MRN: [id number]  YOB: 1970  Date of evaluation: 3/19/2024    Procedure Summary       Date: 03/19/24 Room / Location: [facility]    Anesthesia Start: 0831 Anesthesia Stop: 0859    Procedure: ESOPHAGOGASTRODUODENOSCOPY (Upper GI Region) Diagnosis:       Anemia, unspecified type      (Anemia, unspecified type [D64.9])    Surgeons: Adriana Kennedy MD Responsible Provider: Chan Che Jr., MD    Anesthesia Type: MAC, TIVA ASA Status: 3            Anesthesia Type: No value filed.    Ale Phase I: Ale Score: 9    Ale Phase II: Ale Score: 9    Anesthesia Post Evaluation    Patient location during evaluation: bedside (Endoscopy Unit)  Patient participation: complete - patient participated  Level of consciousness: sleepy but conscious  Pain score: 0  Airway patency: patent  Nausea & Vomiting: no nausea and no vomiting  Cardiovascular status: hemodynamically stable  Respiratory status: acceptable  Hydration status: stable  Comments: This patient remained on the stretcher.  The patient was handed off to the endoscopy nursing team.  All questions regarding pre-, intra-, and postoperative care were answered.  Multimodal analgesia pain management approach        No notable events documented.

## 2024-03-20 ENCOUNTER — APPOINTMENT (OUTPATIENT)
Facility: HOSPITAL | Age: 54
DRG: 426 | End: 2024-03-20
Payer: COMMERCIAL

## 2024-03-20 LAB
ALBUMIN SERPL-MCNC: 2.1 G/DL (ref 3.5–5)
ANION GAP SERPL CALC-SCNC: 3 MMOL/L (ref 5–15)
BUN SERPL-MCNC: 3 MG/DL (ref 6–20)
BUN/CREAT SERPL: 10 (ref 12–20)
CA-I BLD-MCNC: 8.4 MG/DL (ref 8.5–10.1)
CHLORIDE SERPL-SCNC: 98 MMOL/L (ref 97–108)
CO2 SERPL-SCNC: 30 MMOL/L (ref 21–32)
CREAT SERPL-MCNC: 0.29 MG/DL (ref 0.7–1.3)
ERYTHROCYTE [DISTWIDTH] IN BLOOD BY AUTOMATED COUNT: 17 % (ref 11.5–14.5)
GLUCOSE SERPL-MCNC: 100 MG/DL (ref 65–100)
HCT VFR BLD AUTO: 26.3 % (ref 36.6–50.3)
HGB BLD-MCNC: 8.5 G/DL (ref 12.1–17)
MAGNESIUM SERPL-MCNC: 1.5 MG/DL (ref 1.6–2.4)
MCH RBC QN AUTO: 31.4 PG (ref 26–34)
MCHC RBC AUTO-ENTMCNC: 32.3 G/DL (ref 30–36.5)
MCV RBC AUTO: 97 FL (ref 80–99)
NRBC # BLD: 0 K/UL (ref 0–0.01)
NRBC BLD-RTO: 0 PER 100 WBC
PHOSPHATE SERPL-MCNC: 3.2 MG/DL (ref 2.6–4.7)
PLATELET # BLD AUTO: 157 K/UL (ref 150–400)
PMV BLD AUTO: 9.9 FL (ref 8.9–12.9)
POTASSIUM SERPL-SCNC: 4 MMOL/L (ref 3.5–5.1)
RBC # BLD AUTO: 2.71 M/UL (ref 4.1–5.7)
SODIUM SERPL-SCNC: 131 MMOL/L (ref 136–145)
WBC # BLD AUTO: 6.6 K/UL (ref 4.1–11.1)

## 2024-03-20 PROCEDURE — 6360000004 HC RX CONTRAST MEDICATION: Performed by: INTERNAL MEDICINE

## 2024-03-20 PROCEDURE — 31575 DIAGNOSTIC LARYNGOSCOPY: CPT | Performed by: STUDENT IN AN ORGANIZED HEALTH CARE EDUCATION/TRAINING PROGRAM

## 2024-03-20 PROCEDURE — 2700000000 HC OXYGEN THERAPY PER DAY

## 2024-03-20 PROCEDURE — 2580000003 HC RX 258: Performed by: STUDENT IN AN ORGANIZED HEALTH CARE EDUCATION/TRAINING PROGRAM

## 2024-03-20 PROCEDURE — 71046 X-RAY EXAM CHEST 2 VIEWS: CPT

## 2024-03-20 PROCEDURE — 36415 COLL VENOUS BLD VENIPUNCTURE: CPT

## 2024-03-20 PROCEDURE — 2580000003 HC RX 258: Performed by: INTERNAL MEDICINE

## 2024-03-20 PROCEDURE — 2500000003 HC RX 250 WO HCPCS: Performed by: INTERNAL MEDICINE

## 2024-03-20 PROCEDURE — 1100000000 HC RM PRIVATE

## 2024-03-20 PROCEDURE — 74230 X-RAY XM SWLNG FUNCJ C+: CPT

## 2024-03-20 PROCEDURE — 6370000000 HC RX 637 (ALT 250 FOR IP): Performed by: INTERNAL MEDICINE

## 2024-03-20 PROCEDURE — 85027 COMPLETE CBC AUTOMATED: CPT

## 2024-03-20 PROCEDURE — 97110 THERAPEUTIC EXERCISES: CPT

## 2024-03-20 PROCEDURE — 6370000000 HC RX 637 (ALT 250 FOR IP): Performed by: STUDENT IN AN ORGANIZED HEALTH CARE EDUCATION/TRAINING PROGRAM

## 2024-03-20 PROCEDURE — 94761 N-INVAS EAR/PLS OXIMETRY MLT: CPT

## 2024-03-20 PROCEDURE — 80069 RENAL FUNCTION PANEL: CPT

## 2024-03-20 PROCEDURE — 0CJS8ZZ INSPECTION OF LARYNX, VIA NATURAL OR ARTIFICIAL OPENING ENDOSCOPIC: ICD-10-PCS | Performed by: STUDENT IN AN ORGANIZED HEALTH CARE EDUCATION/TRAINING PROGRAM

## 2024-03-20 PROCEDURE — 94640 AIRWAY INHALATION TREATMENT: CPT

## 2024-03-20 PROCEDURE — 83735 ASSAY OF MAGNESIUM: CPT

## 2024-03-20 PROCEDURE — 70491 CT SOFT TISSUE NECK W/DYE: CPT

## 2024-03-20 PROCEDURE — 99233 SBSQ HOSP IP/OBS HIGH 50: CPT | Performed by: STUDENT IN AN ORGANIZED HEALTH CARE EDUCATION/TRAINING PROGRAM

## 2024-03-20 PROCEDURE — 92611 MOTION FLUOROSCOPY/SWALLOW: CPT

## 2024-03-20 PROCEDURE — 6360000002 HC RX W HCPCS: Performed by: INTERNAL MEDICINE

## 2024-03-20 RX ORDER — FUROSEMIDE 10 MG/ML
20 INJECTION INTRAMUSCULAR; INTRAVENOUS ONCE
Status: COMPLETED | OUTPATIENT
Start: 2024-03-20 | End: 2024-03-20

## 2024-03-20 RX ORDER — MAGNESIUM SULFATE HEPTAHYDRATE 40 MG/ML
2000 INJECTION, SOLUTION INTRAVENOUS ONCE
Status: COMPLETED | OUTPATIENT
Start: 2024-03-20 | End: 2024-03-20

## 2024-03-20 RX ORDER — LANOLIN ALCOHOL/MO/W.PET/CERES
400 CREAM (GRAM) TOPICAL 2 TIMES DAILY
Status: DISCONTINUED | OUTPATIENT
Start: 2024-03-20 | End: 2024-03-21 | Stop reason: HOSPADM

## 2024-03-20 RX ORDER — SODIUM CHLORIDE 1 G/1
1 TABLET ORAL EVERY 6 HOURS
Status: COMPLETED | OUTPATIENT
Start: 2024-03-20 | End: 2024-03-21

## 2024-03-20 RX ADMIN — BARIUM SULFATE 30 ML: 400 SUSPENSION ORAL at 10:15

## 2024-03-20 RX ADMIN — SODIUM CHLORIDE, PRESERVATIVE FREE 10 ML: 5 INJECTION INTRAVENOUS at 10:26

## 2024-03-20 RX ADMIN — WHITE PETROLATUM,ZINC OXIDE: 57; 17 PASTE TOPICAL at 20:50

## 2024-03-20 RX ADMIN — Medication 1 G: at 15:56

## 2024-03-20 RX ADMIN — Medication 2 PUFF: at 20:48

## 2024-03-20 RX ADMIN — PANTOPRAZOLE SODIUM 40 MG: 40 TABLET, DELAYED RELEASE ORAL at 05:24

## 2024-03-20 RX ADMIN — SODIUM CHLORIDE, PRESERVATIVE FREE 10 ML: 5 INJECTION INTRAVENOUS at 20:49

## 2024-03-20 RX ADMIN — Medication 1 G: at 12:24

## 2024-03-20 RX ADMIN — WHITE PETROLATUM,ZINC OXIDE: 57; 17 PASTE TOPICAL at 10:26

## 2024-03-20 RX ADMIN — IPRATROPIUM BROMIDE AND ALBUTEROL SULFATE 1 DOSE: 2.5; .5 SOLUTION RESPIRATORY (INHALATION) at 13:03

## 2024-03-20 RX ADMIN — WHITE PETROLATUM,ZINC OXIDE: 57; 17 PASTE TOPICAL at 15:57

## 2024-03-20 RX ADMIN — Medication 1 G: at 00:34

## 2024-03-20 RX ADMIN — FUROSEMIDE 20 MG: 10 INJECTION, SOLUTION INTRAMUSCULAR; INTRAVENOUS at 20:48

## 2024-03-20 RX ADMIN — METOPROLOL TARTRATE 25 MG: 25 TABLET, FILM COATED ORAL at 20:48

## 2024-03-20 RX ADMIN — SODIUM CHLORIDE, PRESERVATIVE FREE 10 ML: 5 INJECTION INTRAVENOUS at 10:25

## 2024-03-20 RX ADMIN — BARIUM SULFATE 30 ML: 0.81 POWDER, FOR SUSPENSION ORAL at 10:15

## 2024-03-20 RX ADMIN — Medication 1 G: at 05:24

## 2024-03-20 RX ADMIN — MAGNESIUM SULFATE HEPTAHYDRATE 2000 MG: 40 INJECTION, SOLUTION INTRAVENOUS at 12:24

## 2024-03-20 RX ADMIN — IPRATROPIUM BROMIDE AND ALBUTEROL SULFATE 1 DOSE: 2.5; .5 SOLUTION RESPIRATORY (INHALATION) at 08:09

## 2024-03-20 RX ADMIN — METOPROLOL TARTRATE 25 MG: 25 TABLET, FILM COATED ORAL at 10:25

## 2024-03-20 RX ADMIN — Medication 1 G: at 20:49

## 2024-03-20 RX ADMIN — Medication 2 PUFF: at 08:09

## 2024-03-20 RX ADMIN — TRAZODONE HYDROCHLORIDE 50 MG: 50 TABLET ORAL at 23:00

## 2024-03-20 RX ADMIN — Medication 400 MG: at 20:49

## 2024-03-20 RX ADMIN — IPRATROPIUM BROMIDE AND ALBUTEROL SULFATE 1 DOSE: 2.5; .5 SOLUTION RESPIRATORY (INHALATION) at 20:42

## 2024-03-20 RX ADMIN — IOPAMIDOL 100 ML: 755 INJECTION, SOLUTION INTRAVENOUS at 18:33

## 2024-03-20 ASSESSMENT — PAIN DESCRIPTION - DESCRIPTORS: DESCRIPTORS: SHOOTING

## 2024-03-20 ASSESSMENT — PAIN SCALES - GENERAL: PAINLEVEL_OUTOF10: 9

## 2024-03-20 ASSESSMENT — PAIN DESCRIPTION - LOCATION: LOCATION: BACK;NECK

## 2024-03-20 ASSESSMENT — PAIN DESCRIPTION - ORIENTATION: ORIENTATION: POSTERIOR

## 2024-03-20 NOTE — CARE COORDINATION
Pending MBS.  Currently on 2L NC.  Home O2 at d/c TBD.  No accepting HH agency, as 13 referrals have been sent out.  No acceptance d/t  patient's insurance not in network.        MAX Salguero, MSW  [phone]

## 2024-03-21 VITALS
RESPIRATION RATE: 18 BRPM | DIASTOLIC BLOOD PRESSURE: 66 MMHG | OXYGEN SATURATION: 97 % | SYSTOLIC BLOOD PRESSURE: 137 MMHG | TEMPERATURE: 98.6 F | BODY MASS INDEX: 24.76 KG/M2 | WEIGHT: 163.36 LBS | HEIGHT: 68 IN | HEART RATE: 93 BPM

## 2024-03-21 PROBLEM — I48.91 A-FIB (HCC): Status: ACTIVE | Noted: 2024-03-21

## 2024-03-21 PROBLEM — U07.1 COVID-19: Status: RESOLVED | Noted: 2024-02-09 | Resolved: 2024-03-21

## 2024-03-21 PROBLEM — K74.60 CIRRHOSIS (HCC): Status: ACTIVE | Noted: 2024-03-21

## 2024-03-21 LAB
ALBUMIN SERPL-MCNC: 2.1 G/DL (ref 3.5–5)
ANION GAP SERPL CALC-SCNC: 5 MMOL/L (ref 5–15)
BUN SERPL-MCNC: 3 MG/DL (ref 6–20)
BUN/CREAT SERPL: 10 (ref 12–20)
CA-I BLD-MCNC: 8.1 MG/DL (ref 8.5–10.1)
CHLORIDE SERPL-SCNC: 94 MMOL/L (ref 97–108)
CO2 SERPL-SCNC: 34 MMOL/L (ref 21–32)
CREAT SERPL-MCNC: 0.3 MG/DL (ref 0.7–1.3)
GLUCOSE SERPL-MCNC: 98 MG/DL (ref 65–100)
PHOSPHATE SERPL-MCNC: 3.5 MG/DL (ref 2.6–4.7)
POTASSIUM SERPL-SCNC: 3.5 MMOL/L (ref 3.5–5.1)
SODIUM SERPL-SCNC: 133 MMOL/L (ref 136–145)

## 2024-03-21 PROCEDURE — 6370000000 HC RX 637 (ALT 250 FOR IP): Performed by: INTERNAL MEDICINE

## 2024-03-21 PROCEDURE — 97530 THERAPEUTIC ACTIVITIES: CPT

## 2024-03-21 PROCEDURE — 94761 N-INVAS EAR/PLS OXIMETRY MLT: CPT

## 2024-03-21 PROCEDURE — 94640 AIRWAY INHALATION TREATMENT: CPT

## 2024-03-21 PROCEDURE — 36415 COLL VENOUS BLD VENIPUNCTURE: CPT

## 2024-03-21 PROCEDURE — 92526 ORAL FUNCTION THERAPY: CPT

## 2024-03-21 PROCEDURE — 2580000003 HC RX 258: Performed by: STUDENT IN AN ORGANIZED HEALTH CARE EDUCATION/TRAINING PROGRAM

## 2024-03-21 PROCEDURE — 6370000000 HC RX 637 (ALT 250 FOR IP): Performed by: STUDENT IN AN ORGANIZED HEALTH CARE EDUCATION/TRAINING PROGRAM

## 2024-03-21 PROCEDURE — 2700000000 HC OXYGEN THERAPY PER DAY

## 2024-03-21 PROCEDURE — 6360000002 HC RX W HCPCS: Performed by: INTERNAL MEDICINE

## 2024-03-21 PROCEDURE — 80069 RENAL FUNCTION PANEL: CPT

## 2024-03-21 RX ORDER — PANTOPRAZOLE SODIUM 40 MG/1
40 TABLET, DELAYED RELEASE ORAL
Qty: 30 TABLET | Refills: 3 | Status: SHIPPED | OUTPATIENT
Start: 2024-03-22 | End: 2024-03-21

## 2024-03-21 RX ORDER — LANOLIN ALCOHOL/MO/W.PET/CERES
400 CREAM (GRAM) TOPICAL 2 TIMES DAILY
Qty: 30 TABLET | Refills: 1 | Status: SHIPPED | OUTPATIENT
Start: 2024-03-21

## 2024-03-21 RX ORDER — FUROSEMIDE 10 MG/ML
20 INJECTION INTRAMUSCULAR; INTRAVENOUS ONCE
Status: COMPLETED | OUTPATIENT
Start: 2024-03-21 | End: 2024-03-21

## 2024-03-21 RX ORDER — PANTOPRAZOLE SODIUM 40 MG/1
40 TABLET, DELAYED RELEASE ORAL 2 TIMES DAILY
Qty: 30 TABLET | Refills: 3 | Status: SHIPPED | OUTPATIENT
Start: 2024-03-21

## 2024-03-21 RX ORDER — FUROSEMIDE 40 MG/1
40 TABLET ORAL DAILY
Qty: 3 TABLET | Refills: 0 | Status: SHIPPED | OUTPATIENT
Start: 2024-03-21 | End: 2024-03-24

## 2024-03-21 RX ADMIN — PANTOPRAZOLE SODIUM 40 MG: 40 TABLET, DELAYED RELEASE ORAL at 06:27

## 2024-03-21 RX ADMIN — SODIUM CHLORIDE, PRESERVATIVE FREE 10 ML: 5 INJECTION INTRAVENOUS at 10:21

## 2024-03-21 RX ADMIN — METOPROLOL TARTRATE 25 MG: 25 TABLET, FILM COATED ORAL at 10:20

## 2024-03-21 RX ADMIN — Medication 1 G: at 10:20

## 2024-03-21 RX ADMIN — IPRATROPIUM BROMIDE AND ALBUTEROL SULFATE 1 DOSE: 2.5; .5 SOLUTION RESPIRATORY (INHALATION) at 14:13

## 2024-03-21 RX ADMIN — Medication 400 MG: at 10:20

## 2024-03-21 RX ADMIN — ACETAMINOPHEN 650 MG: 325 TABLET ORAL at 12:30

## 2024-03-21 RX ADMIN — Medication 1 G: at 03:57

## 2024-03-21 RX ADMIN — IPRATROPIUM BROMIDE AND ALBUTEROL SULFATE 1 DOSE: 2.5; .5 SOLUTION RESPIRATORY (INHALATION) at 07:47

## 2024-03-21 RX ADMIN — FUROSEMIDE 20 MG: 10 INJECTION, SOLUTION INTRAMUSCULAR; INTRAVENOUS at 12:30

## 2024-03-21 RX ADMIN — THERA TABS 1 TABLET: TAB at 10:20

## 2024-03-21 RX ADMIN — THIAMINE HCL TAB 100 MG 100 MG: 100 TAB at 10:20

## 2024-03-21 RX ADMIN — Medication 2 PUFF: at 07:47

## 2024-03-21 ASSESSMENT — PAIN DESCRIPTION - LOCATION: LOCATION: NECK

## 2024-03-21 ASSESSMENT — PAIN SCALES - GENERAL: PAINLEVEL_OUTOF10: 3

## 2024-03-21 NOTE — DISCHARGE INSTR - DIET
For liquid diet-use thickener to get to consistency of nectar  Oral intake only when sitting upright

## 2024-03-21 NOTE — CARE COORDINATION
Transition of Care Plan:    RUR:   Prior Level of Functioning:   Disposition: Home self care  Follow up appointments: PCP, pulmonology, and all other recommended specialists  DME needed: Home O2  Transportation at discharge: Daughter  IM/IMM Medicare/ letter given: N/A  Is patient a  and connected with [facility]? N/A  If yes, was [facility] transfer form completed and [facility] notified? N/A  Caregiver Contact: Daughter  Discharge Caregiver contacted prior to discharge? Patient contacted his daughter  Care Conference needed? N/A  Barriers to discharge:     Home O2 via [employer]  No accepting .      CHRIS Hawkins  [phone]      15:00PM Home O2 via [employer] delivered to the patient's room.      CHRIS Hawkins  [phone]      11:09AM Home O2 referral sent to [facility].  Pending acceptance and insurance auth/approval.     No accepting  agency d/t payor not accepted.       CHRIS Hawkins  [phone]

## 2024-03-21 NOTE — DISCHARGE SUMMARY
Physician Discharge Summary     Patient ID:    Oumar Merida III  [id number]  54 y.o.  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  .  Patient consumes 2-4 beers a day on regular.  Last drink was this morning.  Patient has had multiple episodes of hyponatremia in the past.   On arrival to the emergency department patient was hemodynamically stable afebrile.  Labs significant for a sodium level of 108 chloride at 73.  Abnormal LFTs.  Leukocytosis.  CT head with no acute intracranial abnormalities.        Severe hyponatremia, acute on chronic  Likely due to significant alcohol use and poor p.o. intake  May be a component of SIADH   Initially sodium 108 now up to 131.  Appreciate nephrology assistance  Improved initially with IV fluids, salt tabs and fluid restriction.  Also receiving Lasix for fluid removal which should help raise his sodium.  Advised recheck with PCP in 3 to 5 days after discharge and if no longer drinking and sodium stable may be able to liberalize his fluid restriction         Dysphagia -this has been a chronic issue  Tolerating full liquid diet and supplements with meals but he has oropharyngeal edema and significant dysphagia.  I

## 2024-03-21 NOTE — DISCHARGE INSTRUCTIONS
Wear oxygen at all times - follow-up with your PCP to recheck oxygen needs  Take lasix daily for next 3 days to remove fluid - this should help with your breathing  NO ALCOHOL USE  Thicken all liquids.  Fluid restrict to 1500 mL daily  Seek medical attention if you develop worsening shortness of breath, confusion, chest pain, inability to swallow even liquids  Your blood pressure medications have been changed-stop lisinopril and start taking metoprolol twice daily  Take Protonix twice daily to help heal ulcers and inflammation in your esophagus

## 2024-03-21 NOTE — PLAN OF CARE
PHYSICAL THERAPY TREATMENT     Patient: Oumra Merida III (54 y.o. male)  Date: 3/21/2024  Diagnosis: Hyponatremia syndrome [E87.1] Hyponatremia syndrome  Procedure(s) (LRB):  ESOPHAGOGASTRODUODENOSCOPY (N/A) 2 Days Post-Op  Precautions: Fall Risk, General Precautions, Other (comment) (CHAIR ALARM)                      Recommendations for nursing mobility: Out of bed to chair for meals, Encourage HEP in prep for ADLs/mobility; see handout for details, Use of BSC for toileting , and Assist x1    In place during session: Nasal Cannula 2-2.5L and EKG/telemetry   Chart, physical therapy assessment, plan of care and goals were reviewed.  ASSESSMENT  Patient continues with skilled PT services and is progressing towards goals. Pt semi supine upon PT arrival, agreeable to session. Pt A&O x 4. (See below for objective details and assist levels).     Overall pt tolerated session fair today with bed mobility and therex, limited by activity tolerance. SA02 desats to 85% with exertion, increased .5L, recovered to 90%. Pt required min encouragement to participate in treatment, agreeable to supine therex. Pt reports fatigue due to not sleeping. Pt transferred to EOB end of session with SBA. Will continue to benefit from skilled PT services, and will continue to progress as tolerated. Potential barriers for safe discharge: concern for pt safely navigating or managing the home environment. Current PT DC recommendation Home with Home Health Therapy and family assist once medically appropriate.     Start of Session End of Session   SPO2 (%) 92 91   Heart Rate (BPM) 94 102     GOALS:      Problem: Physical Therapy - Adult  Goal: By Discharge: Performs mobility at highest level of function for planned discharge setting.  See evaluation for individualized goals.  Outcome: Progressing  Note: FUNCTIONAL STATUS PRIOR TO ADMISSION: Patient was modified independent using a single point cane for functional mobility.     HOME 
  Problem: Discharge Planning  Goal: Discharge to home or other facility with appropriate resources  Outcome: Progressing     Problem: Chronic Conditions and Co-morbidities  Goal: Patient's chronic conditions and co-morbidity symptoms are monitored and maintained or improved  Outcome: Progressing     Problem: Safety - Adult  Goal: Free from fall injury  Outcome: Progressing     
  Problem: Discharge Planning  Goal: Discharge to home or other facility with appropriate resources  Outcome: Progressing     Problem: Chronic Conditions and Co-morbidities  Goal: Patient's chronic conditions and co-morbidity symptoms are monitored and maintained or improved  Outcome: Progressing     Problem: Safety - Adult  Goal: Free from fall injury  Outcome: Progressing     Problem: Neurosensory - Adult  Goal: Achieves stable or improved neurological status  Outcome: Progressing     
  Problem: Discharge Planning  Goal: Discharge to home or other facility with appropriate resources  Outcome: Progressing     Problem: Chronic Conditions and Co-morbidities  Goal: Patient's chronic conditions and co-morbidity symptoms are monitored and maintained or improved  Outcome: Progressing     Problem: Safety - Adult  Goal: Free from fall injury  Outcome: Progressing     Problem: Neurosensory - Adult  Goal: Achieves stable or improved neurological status  Outcome: Progressing  Goal: Achieves maximal functionality and self care  Outcome: Progressing     Problem: Cardiovascular - Adult  Goal: Absence of cardiac dysrhythmias or at baseline  Outcome: Progressing     
  Problem: Discharge Planning  Goal: Discharge to home or other facility with appropriate resources  Outcome: Progressing     Problem: Chronic Conditions and Co-morbidities  Goal: Patient's chronic conditions and co-morbidity symptoms are monitored and maintained or improved  Outcome: Progressing     Problem: Safety - Adult  Goal: Free from fall injury  Outcome: Progressing     Problem: Neurosensory - Adult  Goal: Achieves stable or improved neurological status  Outcome: Progressing  Goal: Achieves maximal functionality and self care  Outcome: Progressing     Problem: Cardiovascular - Adult  Goal: Absence of cardiac dysrhythmias or at baseline  Outcome: Progressing     Problem: Skin/Tissue Integrity - Adult  Goal: Skin integrity remains intact  Outcome: Progressing     Problem: SLP Adult - Impaired Swallowing  Goal: By Discharge: Advance to least restrictive diet without signs or symptoms of aspiration for planned discharge setting.  See evaluation for individualized goals.  Description: Speech Therapy Swallow Goals    Initiated 3/15/2024    -Patient stated goal: pt is eager to eat/drink and go home    -Patient will tolerate Full liquid and mildly thick liquids diet without clinical indicators of aspiration given min cues within 7 day(s). (Goal met 3/16/24)    -Patient will tolerate minced and moist and thin liquids diet without clinical indicators of aspiration given min cues within 7 day(s). (Goal started 3/16/24)        -Patient will tolerate PO trials without clinical indicators of aspiration given min cues within 7 day(s).      -Patient will participate in modified barium swallow study within 7 day(s).      -Patient will demonstrate understanding of swallow safety precautions and aspiration precautions, diet recs with min cues within 7 day(s).           3/20/2024 1242 by Vanessa Peters, SLP  Outcome: Progressing     Problem: Occupational Therapy - Adult  Goal: By Discharge: Performs self-care 
  Problem: Discharge Planning  Goal: Discharge to home or other facility with appropriate resources  Recent Flowsheet Documentation  Taken 3/16/2024 2000 by Nedra Cosby RN  Discharge to home or other facility with appropriate resources: Identify barriers to discharge with patient and caregiver  Taken 3/16/2024 0800 by Isabel Almeida RN  Discharge to home or other facility with appropriate resources:   Identify barriers to discharge with patient and caregiver   Arrange for needed discharge resources and transportation as appropriate   Identify discharge learning needs (meds, wound care, etc)     Problem: Chronic Conditions and Co-morbidities  Goal: Patient's chronic conditions and co-morbidity symptoms are monitored and maintained or improved  Recent Flowsheet Documentation  Taken 3/16/2024 2000 by Nerda Cosby RN  Care Plan - Patient's Chronic Conditions and Co-Morbidity Symptoms are Monitored and Maintained or Improved:   Monitor and assess patient's chronic conditions and comorbid symptoms for stability, deterioration, or improvement   Collaborate with multidisciplinary team to address chronic and comorbid conditions and prevent exacerbation or deterioration   Update acute care plan with appropriate goals if chronic or comorbid symptoms are exacerbated and prevent overall improvement and discharge  Taken 3/16/2024 0800 by Isabel Almeida RN  Care Plan - Patient's Chronic Conditions and Co-Morbidity Symptoms are Monitored and Maintained or Improved:   Monitor and assess patient's chronic conditions and comorbid symptoms for stability, deterioration, or improvement   Collaborate with multidisciplinary team to address chronic and comorbid conditions and prevent exacerbation or deterioration     Problem: Safety - Adult  Goal: Free from fall injury  Recent Flowsheet Documentation  Taken 3/16/2024 2000 by Nedra Cosby RN  Free From Fall Injury:   Instruct family/caregiver on patient safety   Based on 
  Problem: Metabolic/Fluid and Electrolytes - Adult  Goal: Electrolytes maintained within normal limits  Outcome: Progressing  Flowsheets (Taken 3/20/2024 2027)  Electrolytes maintained within normal limits:   Monitor labs and assess patient for signs and symptoms of electrolyte imbalances   Administer electrolyte replacement as ordered     Problem: Respiratory - Adult  Goal: Achieves optimal ventilation and oxygenation  Outcome: Progressing     
  Problem: SLP Adult - Impaired Swallowing  Goal: By Discharge: Advance to least restrictive diet without signs or symptoms of aspiration for planned discharge setting.  See evaluation for individualized goals.  Description: Speech Therapy Swallow Goals    Initiated 3/15/2024    -Patient stated goal: pt is eager to eat/drink and go home    -Patient will tolerate Full liquid and mildly thick liquids diet without clinical indicators of aspiration given min cues within 7 day(s).        -Patient will tolerate PO trials without clinical indicators of aspiration given min cues within 7 day(s).      -Patient will participate in modified barium swallow study within 7 day(s).      -Patient will demonstrate understanding of swallow safety precautions and aspiration precautions, diet recs with min cues within 7 day(s).           3/15/2024 1244 by Vanessa Andrade, SLP  Outcome: Progressing     Problem: Discharge Planning  Goal: Discharge to home or other facility with appropriate resources  Recent Flowsheet Documentation  Taken 3/15/2024 2000 by Nedra Cosby RN  Discharge to home or other facility with appropriate resources:   Identify barriers to discharge with patient and caregiver   Arrange for needed discharge resources and transportation as appropriate   Identify discharge learning needs (meds, wound care, etc)     Problem: Chronic Conditions and Co-morbidities  Goal: Patient's chronic conditions and co-morbidity symptoms are monitored and maintained or improved  Recent Flowsheet Documentation  Taken 3/15/2024 2000 by Nedra Cosby RN  Care Plan - Patient's Chronic Conditions and Co-Morbidity Symptoms are Monitored and Maintained or Improved:   Monitor and assess patient's chronic conditions and comorbid symptoms for stability, deterioration, or improvement   Collaborate with multidisciplinary team to address chronic and comorbid conditions and prevent exacerbation or deterioration   Update acute care plan with 
  Problem: SLP Adult - Impaired Swallowing  Goal: By Discharge: Advance to least restrictive diet without signs or symptoms of aspiration for planned discharge setting.  See evaluation for individualized goals.  Description: Speech Therapy Swallow Goals    Initiated 3/15/2024    -Patient stated goal: pt is eager to eat/drink and go home    -Patient will tolerate Full liquid and mildly thick liquids diet without clinical indicators of aspiration given min cues within 7 day(s).        -Patient will tolerate PO trials without clinical indicators of aspiration given min cues within 7 day(s).      -Patient will participate in modified barium swallow study within 7 day(s).      -Patient will demonstrate understanding of swallow safety precautions and aspiration precautions, diet recs with min cues within 7 day(s).           3/16/2024 1952 by Liz Montilla, SLP  Outcome: Progressing     
  Problem: Safety - Adult  Goal: Free from fall injury  3/19/2024 2342 by Melanie Zuniga, RN  Flowsheets (Taken 3/19/2024 2342)  Free From Fall Injury:   Instruct family/caregiver on patient safety   Based on caregiver fall risk screen, instruct family/caregiver to ask for assistance with transferring infant if caregiver noted to have fall risk factors  . Do hourly rounds  . Place bed in lowest position, lock wheels and apply brake  . Apply non skid socks to patients feet  . Do fall education  . Place belongings in close proximity  
  Problem: Safety - Adult  Goal: Free from fall injury  Flowsheets (Taken 3/19/2024 0145)  Free From Fall Injury:   Instruct family/caregiver on patient safety   Based on caregiver fall risk screen, instruct family/caregiver to ask for assistance with transferring infant if caregiver noted to have fall risk factors     
  Problem: Safety - Adult  Goal: Free from fall injury  Outcome: Progressing     Problem: Neurosensory - Adult  Goal: Achieves stable or improved neurological status  Outcome: Progressing     Problem: Skin/Tissue Integrity  Goal: Absence of new skin breakdown  Description: 1.  Monitor for areas of redness and/or skin breakdown  2.  Assess vascular access sites hourly  3.  Every 4-6 hours minimum:  Change oxygen saturation probe site  4.  Every 4-6 hours:  If on nasal continuous positive airway pressure, respiratory therapy assess nares and determine need for appliance change or resting period.  Outcome: Progressing     
OCCUPATIONAL THERAPY TREATMENT  Patient: Oumar Merida III (54 y.o. male)  Date: 3/20/2024  Primary Diagnosis: Hyponatremia syndrome [E87.1]  Procedure(s) (LRB):  ESOPHAGOGASTRODUODENOSCOPY (N/A) 1 Day Post-Op   Precautions: Fall Risk, General Precautions, Other (comment) (CHAIR ALARM)                Recommendations for nursing mobility: Encourage HEP in prep for ADLs/mobility; see handout for details, Frequent repositioning to prevent skin breakdown, Use of bed/chair alarm for safety, AD and gt belt for bed to chair , Amb to bathroom with AD and gait belt, and Assist x1    In place during session: Peripheral IV and Nasal Cannula 1L  Chart, occupational therapy assessment, plan of care, and goals were reviewed.  ASSESSMENT  Patient continues with skilled OT services and is progressing towards goals. Pt side lying on his left side upon MOORE arrival, agreeable to session. Pt A&O x 4. Pt requesting to remain side lying and agreeable to UE therex.  Pt completed 1 set with frequent rest breaks secondary oxygen dropping to 87-88% during exercises.  Pt recovered to low 90s with PLB and rest breaks. Pt and pt's mother educated on energy conservation including purse lip breathing and incorporating rest breaks.  Both verbalized understanding. Pt required verbal cues to complete purse lip breathing and incorporating rest breaks.  Pt asymptomatic when oxygen levels drops.  Therapist recommending chair push ups while sitting in chair.  Pt excited about exercises secondary asking about buying exercise equipment to help him at home.  Pt continues to benefit from continued education. (See below for objective details and assist levels).     Overall pt tolerated session fair today with frequent rest breaks. Potential barriers for safe discharge: pt is a high fall risk and concern for pt safely navigating or managing the home environment. Current OT recommendations for discharge Home with Home Health Therapy and family assist. 
SPEECH PATHOLOGY MODIFIED BARIUM SWALLOW STUDY    Patient: Oumar Merida III (54 y.o. male)  Date: 3/20/2024  Primary Diagnosis: Hyponatremia syndrome [E87.1]  Procedure(s) (LRB):  ESOPHAGOGASTRODUODENOSCOPY (N/A) 1 Day Post-Op   Precautions: Aspiration  Fall Risk, General Precautions, Other (comment) (CHAIR ALARM)                DIET RECOMMENDATIONS: Full liquid and mildly thick liquids, meds crushed if able in applesauce or pudding,    SWALLOW SAFETY PRECAUTIONS: Rec slow rate of intake, small bites/sips, 6 small meals, double swallow, liquid wash, remain upright 1 hour after PO and do not eat 3 hours before bedtime.     ASSESSMENT :  Based on the objective data described below, the patient presents with moderate to severe pharyngeal dysphagia.  Oral phase is wfl  All consistencies initiate at the level of the vallecula. Overall, timely swallow initiation.   Moderate reduction in BOT retraction. Moderate to severe reduction in HLE and pharyngeal constriction.  Limited to absent hyoid protraction. Epiglottis does not invert. Weight of bolus does not improve inversion.   Laryngeal penetration DURING and AFTER the swallow with all consistencies. Greater degree of penetration DURING the swallow w/ thin and mildly thick. W/ moderately thick and pudding there is greater degree of pharyngeal residue w/ penetration after the swallow.  There is subsequent aspiration w/ independent throat clear patient expels aspiration of all consistencies into oral cavity. Re-penetration occurs s/t pharyngeal residue.  There is pre-vertebral edema rec direct visualization and CT neck WWO. .  Moderate to significant pharyngeal residue. Decreased UES relaxation and duration of relaxation s/t global weakness. Decreased bolus clearance through proximal esophagus. Patient is at high risk for aspiration and malnutrition s/t severity of dysphagia.   ENT plans for laryngoscopy today.     GOALS:    Problem: SLP Adult - Impaired 
Speech LAnguage Pathology Dysphagia EVALUATION    Patient: Oumar Merida III (54 y.o. male)  Date: 3/15/2024  Primary Diagnosis: Hyponatremia syndrome [E87.1]  Procedure(s) (LRB):  ESOPHAGOGASTRODUODENOSCOPY BIOPSY (N/A)     Precautions: aspiration  DIET RECOMMENDATIONS: Full liquid and mildly thick liquids    SWALLOW SAFETY PRECAUTIONS: 1:1 assistance with ALL PO intake, STRICT aspiration and GERD precautions, monitor pt closely for s/s aspiration, meds crushed if able in applesauce or pudding, FEED ONLY IF AWAKE AND ALERT.      ASSESSMENT :  Based on the objective data described below, the patient presents with moderate oropharyngeal dysphagia with concerns for aspiration, suspected esophageal dysphagia based on pt's hx. However, pt reports cervical spine hardware is impacting swallow function.     Pt seen for bedside sw evaluation, see details below.     Pt admitted with hyponatremia, AMS, HTN, COPD exacerbation, pna, metabolic encephalopathy, ETOH on CIWAH.  Pt was for OP EGD this date due to dysphagia to solids; however, pt unable to participate in EGD this date given medical status. GI has been consulted this admission. Pt was reportedly only able to tolerate purees and liquids prior to current admission.   Pt is alert and responds appropriately. Pt reports previous cervical spine hardware, shifting and leading to dysphagia. Pt was for OP EGD to see if stretching would help per pt. Pt also reports that he will get a feeding tube pending follow-up with his spine surgeon if EGD does not improve dysphagia.      Oral phase WFL. Pharyngeal phase is weak but functional to palpation. Change in resonance intermittently following swallows.  Intermittent wet vocal quality and cough/throat clear following trials.  Concerns for pharyngeal residue, pen/asp are present. Pt with underlying COPD and reports this is his COPD cough; however, is consistent with possible aspiration given increased 02 demands.    CT neck 
Speech LAnguage Pathology Dysphagia TREATMENT    Patient: Oumar Merida III (54 y.o. male)  Date: 3/16/2024  Primary Diagnosis: Hyponatremia syndrome [E87.1]  Procedure(s) (LRB):  ESOPHAGOGASTRODUODENOSCOPY BIOPSY (N/A)     Precautions: Aspiration                    ASSESSMENT :  Patient positioned upright, alert, agreeable to treatment. Nurse reported the patient has been doing well with him full liquid diet but requesting a diet upgrade. Upon arrival and SLP introducing self the patient immediately spoke about wanting to upgrade his diet. He wants more textured foods, like scrambled eggs, and says he will be able to eat the upgraded diet. SLP provided cookies crushed in yogurt and he was able to tolerate without difficulty. Oral phase: slow mastication of bolus but functional overall. Pharyngeal phases: noted some double swallows. Overall, no overt s/s aspiration.     PLAN :  Recommendations and Planned Interventions:  Diet: Minced and moist and thin liquids  Staff to provide assistance as needed. EGD on Monday; MBS to assess dysphagia     Acute SLP Services: Yes, SLP will continue to follow per plan of care.    Discharge Recommendations: To Be Determined     SUBJECTIVE:   I want more textured foods.    OBJECTIVE:     Past Medical History:   Diagnosis Date    Ankylosing spondylitis ([facility])     COPD (chronic obstructive pulmonary disease) ([facility])     Hypertension     Hyponatremia     SIADH (syndrome of inappropriate ADH production) ([facility])      Past Surgical History:   Procedure Laterality Date    CERVICAL LAMINECTOMY      C6 laminectomy    HIP ARTHROPLASTY      left hip    SPINAL FUSION      cervico thoracic fusion C2-T4     Prior Level of Function/Home Situation:   Social/Functional History  Lives With: Parent  Occupation: Unemployed    Cognitive and Communication Status:  Neurologic State: Alert  Orientation Level: Oriented x4  Cognition: Appropriate decision making, Appropriate for age attention/concentration, 
Speech LAnguage Pathology Dysphagia TREATMENT    Patient: Oumar Merida III (54 y.o. male)  Date: 3/21/2024  Primary Diagnosis: Hyponatremia syndrome [E87.1]  Procedure(s) (LRB):  ESOPHAGOGASTRODUODENOSCOPY (N/A) 2 Days Post-Op   Precautions: aspiration, Fall Risk, General Precautions, Other (comment) (CHAIR ALARM)     Time In: 1136  Time Out: 1155    DIET RECOMMENDATIONS: Full liquid and mildly thick liquids    SWALLOW SAFETY PRECAUTIONS: 1:1 assistance with ALL PO intake, STRICT aspiration and GERD precautions, monitor pt closely for s/s aspiration, meds crushed if able in applesauce or pudding, FEED ONLY IF AWAKE AND ALERT.      ASSESSMENT :  Based on the objective data described below, the patient presents with severe pharyngeal dysphagia with aspiration risk  persistent this date.     Pt seen for follow-up, positioned upright on EOB upon clinician entry. Pt currently on NC02 and pending d/c home this date.  Pt reports plan is for OP follow-up next week for PEG placement. Meanwhile, pt will remain on full mildly thick liquids pending PEG placement.   Results from CT neck WWO pending at this time.   Pt given trials of mildly thick via straw and puree. Pt instructed re: use of effortful and double swallows and pt able to demonstrate independently following instruction. Oral phase WFL. Pharyngeal phase remains weak to palpation and delayed throat clear with expectoration of sputum suspected to be mixed with trials provided. Pt self-suctions.     Reviewed with pt at length re: recent MBS results, aspiration observed, possible aspiration sequela to include pna and death, nature and severity of dysphagia, prognosis, recs for ongoing SLP intervention, swallow safety precautions, compensatory strategies, use of and obtaining thickener, determining appropriate liquid thickness.  Thickener starter kit provided. Pt expresses and demonstrates understanding.    UE shakiness present.   Patient will benefit from skilled 
commands    After Treatment:  Patient left in no apparent distress in bed, Call bell left within reach, and Nursing notified     Pain:  VAS (numerical) 0/10    COMMUNICATION/EDUCATION:   Aspiration precautions and Diet recommendations education provided to Patient and Family via explanation, all questions/concerns addressed. Patient verbalizes understanding and requires reinforcement.    Thank you,  Liz Montano, SLP.D., CCC-SLP  Minutes: 15     Problem: SLP Adult - Impaired Swallowing  Goal: By Discharge: Advance to least restrictive diet without signs or symptoms of aspiration for planned discharge setting.  See evaluation for individualized goals.  Description: Speech Therapy Swallow Goals    Initiated 3/15/2024    -Patient stated goal: pt is eager to eat/drink and go home    -Patient will tolerate Full liquid and mildly thick liquids diet without clinical indicators of aspiration given min cues within 7 day(s). (Goal met 3/16/24)    -Patient will tolerate minced and moist and thin liquids diet without clinical indicators of aspiration given min cues within 7 day(s). (Goal started 3/16/24)        -Patient will tolerate PO trials without clinical indicators of aspiration given min cues within 7 day(s).      -Patient will participate in modified barium swallow study within 7 day(s).      -Patient will demonstrate understanding of swallow safety precautions and aspiration precautions, diet recs with min cues within 7 day(s).           Outcome: Progressing     
Care;Precautions;Transfer Training;Energy Conservation;Orientation;Fall Prevention Strategies  Education Method: Demonstration;Verbal;Teach Back  Barriers to Learning: None  Education Outcome: Verbalized understanding;Demonstrated understanding;Continued education needed         Thank you for this referral.  Macy Romano, PT  Minutes: 25       
vision or hearing, underlying metabolic abnormalities, dehydration, psychiatric diagnoses, and notify attending LIP  2. Hunters high risk fall precautions, as indicated  3. Provide frequent short contacts to provide reality reorientation, refocusing and direction  4. Decrease environmental stimuli, including noise as appropriate  5. Monitor and intervene to maintain adequate nutrition, hydration, elimination, sleep and activity  6. If unable to ensure safety without constant attention obtain sitter and review sitter guidelines with assigned personnel  7. Initiate Psychosocial CNS and Spiritual Care consult, as indicated  Recent Flowsheet Documentation  Taken 3/16/2024 0800 by Isabel Almeida RN  Effect of thought disturbance (confusion, delirium, dementia, or psychosis) are managed with adequate functional status:   Assess for contributors to thought disturbance, including medications, impaired vision or hearing, underlying metabolic abnormalities, dehydration, psychiatric diagnoses, notify LIP   Provide frequent short contacts to provide reality reorientation, refocusing and direction   [facility] high risk fall precautions, as indicated  Taken 3/15/2024 2000 by Nedra Cosby RN  Effect of thought disturbance (confusion, delirium, dementia, or psychosis) are managed with adequate functional status:   [facility] high risk fall precautions, as indicated   Provide frequent short contacts to provide reality reorientation, refocusing and direction   Decrease environmental stimuli, including noise as appropriate   Monitor and intervene to maintain adequate nutrition, hydration, elimination, sleep and activity     Problem: Respiratory - Adult  Goal: Achieves optimal ventilation and oxygenation  Recent Flowsheet Documentation  Taken 3/16/2024 0800 by Isabel Almeida RN  Achieves optimal ventilation and oxygenation:   Assess for changes in respiratory status   Assess for changes in mentation and behavior   Position to

## 2024-03-22 NOTE — PROGRESS NOTES
Renal  Note    NAME:  Oumar Merida III   :   1970   MRN:   [id number]     ATTENDING: Eliud Calzada MD  PCP:  Rayshawn Bloom APRN - NP    Date/Time:  3/16/2024 9:49 AM  REASON FOR CONSULT:    Severe hyponatremia      Subjective:     Patient seen in the ICU at bedside, comfortably resting alert awake oriented x 3.  Denied having any symptoms of shortness of breath or chest pain.    -Spoke to nurse at bedside, he had to be started on Cardizem, heparin drip as he had A-fib with RVR.   Patient tentatively scheduled for EGD on Monday morning.  Past Medical History:   Diagnosis Date    Ankylosing spondylitis (HCC)     COPD (chronic obstructive pulmonary disease) (HCC)     Hypertension     Hyponatremia     SIADH (syndrome of inappropriate ADH production) (HCC)       Past Surgical History:   Procedure Laterality Date    CERVICAL LAMINECTOMY      C6 laminectomy    HIP ARTHROPLASTY      left hip    SPINAL FUSION      cervico thoracic fusion C2-T4     Social History     Tobacco Use    Smoking status: Former     Current packs/day: 0.00     Types: Cigarettes     Quit date: 2022     Years since quittin.2    Smokeless tobacco: Current    Tobacco comments:     Dipping    Substance Use Topics    Alcohol use: Yes      Family History   Family history unknown: Yes       No Known Allergies   Prior to Admission medications    Medication Sig Start Date End Date Taking? Authorizing Provider   amoxicillin-clavulanate (AUGMENTIN) 875-125 MG per tablet Take 1 tablet by mouth 2 times daily   Yes ProviderOmuou MD   lisinopril (PRINIVIL;ZESTRIL) 20 MG tablet Take 1 tablet by mouth daily   Yes ProviderOumou MD   venlafaxine (EFFEXOR) 75 MG tablet Take 1 tablet by mouth nightly   Yes ProviderOumou MD   sodium bicarbonate 650 MG tablet Take 0.5 tablets by mouth 2 times daily   Yes ProviderOumou MD   traZODone (DESYREL) 50 MG tablet Take 1 tablet by mouth nightly as needed for 
             Renal  Note    NAME:  Oumar Merida III   :   1970   MRN:   [id number]     ATTENDING: Eliud Calzada MD  PCP:  Rayshawn Bloom APRN - NP    Date/Time:  3/17/2024 5:29 AM  REASON FOR CONSULT:    Severe hyponatremia      Subjective:     Patient seen in the ICU at bedside, comfortably resting alert awake oriented x 3.  Denied having any symptoms of shortness of breath or chest pain.    _ No acute events overnight     Past Medical History:   Diagnosis Date    Ankylosing spondylitis (HCC)     COPD (chronic obstructive pulmonary disease) (HCC)     Hypertension     Hyponatremia     SIADH (syndrome of inappropriate ADH production) (HCC)       Past Surgical History:   Procedure Laterality Date    CERVICAL LAMINECTOMY      C6 laminectomy    HIP ARTHROPLASTY      left hip    SPINAL FUSION      cervico thoracic fusion C2-T4     Social History     Tobacco Use    Smoking status: Former     Current packs/day: 0.00     Types: Cigarettes     Quit date: 2022     Years since quittin.2    Smokeless tobacco: Current    Tobacco comments:     Dipping    Substance Use Topics    Alcohol use: Yes      Family History   Family history unknown: Yes       No Known Allergies   Prior to Admission medications    Medication Sig Start Date End Date Taking? Authorizing Provider   amoxicillin-clavulanate (AUGMENTIN) 875-125 MG per tablet Take 1 tablet by mouth 2 times daily   Yes ProviderOumou MD   lisinopril (PRINIVIL;ZESTRIL) 20 MG tablet Take 1 tablet by mouth daily   Yes ProviderOumou MD   venlafaxine (EFFEXOR) 75 MG tablet Take 1 tablet by mouth nightly   Yes ProviderOumou MD   sodium bicarbonate 650 MG tablet Take 0.5 tablets by mouth 2 times daily   Yes ProviderOumou MD   traZODone (DESYREL) 50 MG tablet Take 1 tablet by mouth nightly as needed for Sleep   Yes ProviderOumou MD   ibuprofen (ADVIL;MOTRIN) 800 MG tablet Take 1 tablet by mouth every 8 hours as needed for 
             Renal  Note    NAME:  Oumar Merida III   :   1970   MRN:   [id number]     ATTENDING: Eliud Calzada MD  PCP:  Rayshawn Bloom APRN - NP    Date/Time:  3/15/2024 1:51 PM      Subjective:   REQUESTING PHYSICIAN:  REASON FOR CONSULT:    Severe hyponatremia  Patient seen in the ICU.  Patient himself is lethargic but responds to questions in monosyllables and nods   Sodium today is 126.  He is on isotonic saline at 75 mL/h  Past Medical History:   Diagnosis Date    Ankylosing spondylitis (HCC)     COPD (chronic obstructive pulmonary disease) (HCC)     Hypertension     Hyponatremia     SIADH (syndrome of inappropriate ADH production) (Formerly [facility])       Past Surgical History:   Procedure Laterality Date    CERVICAL LAMINECTOMY      C6 laminectomy    HIP ARTHROPLASTY      left hip    SPINAL FUSION      cervico thoracic fusion C2-T4     Social History     Tobacco Use    Smoking status: Former     Current packs/day: 0.00     Types: Cigarettes     Quit date: 2022     Years since quittin.2    Smokeless tobacco: Current    Tobacco comments:     Dipping    Substance Use Topics    Alcohol use: Yes      Family History   Family history unknown: Yes       No Known Allergies   Prior to Admission medications    Medication Sig Start Date End Date Taking? Authorizing Provider   amoxicillin-clavulanate (AUGMENTIN) 875-125 MG per tablet Take 1 tablet by mouth 2 times daily   Yes ProviderOumou MD   lisinopril (PRINIVIL;ZESTRIL) 20 MG tablet Take 1 tablet by mouth daily   Yes ProviderOumou MD   venlafaxine (EFFEXOR) 75 MG tablet Take 1 tablet by mouth nightly   Yes ProviderOumou MD   sodium bicarbonate 650 MG tablet Take 0.5 tablets by mouth 2 times daily   Yes ProviderOumou MD   traZODone (DESYREL) 50 MG tablet Take 1 tablet by mouth nightly as needed for Sleep   Yes ProviderOumou MD   ibuprofen (ADVIL;MOTRIN) 800 MG tablet Take 1 tablet by mouth every 8 hours as needed 
             Renal Consultation Note    NAME:  Oumar Merida III   :   1970   MRN:   [id number]     ATTENDING: Eliud Calzada MD  PCP:  Rayshawn Bloom APRN - NP    Date/Time:  3/13/2024 3:57 PM      Subjective:   REQUESTING PHYSICIAN:  REASON FOR CONSULT:    Severe hyponatremia    Patient seen in the ICU.  His sodium today is 120.  He was started on hypertonic saline in the ER yesterday and his sodium increased to 120 so he was started on D5W instead for 3 hours.  Sodium was 115 when D5W was discontinued.  He was without fluids all night and sodium was 119 this morning.  He was started on 0.9% NS at 50 mL/h.  Sodium is 120 on last labs drawn few minutes ago.  Patient himself is much more awake and alert.  He responds to questions appropriately   Tells me he will leave home tonight irrespective of the sodium  Urine studies are showing low urine sodium.  This does not suggest SIADH    Past Medical History:   Diagnosis Date    Ankylosing spondylitis ([facility])     COPD (chronic obstructive pulmonary disease) ([facility])     Hypertension     Hyponatremia     SIADH (syndrome of inappropriate ADH production) ([facility])       Past Surgical History:   Procedure Laterality Date    CERVICAL LAMINECTOMY      C6 laminectomy    HIP ARTHROPLASTY      left hip    SPINAL FUSION      cervico thoracic fusion C2-T4     Social History     Tobacco Use    Smoking status: Former     Current packs/day: 0.00     Types: Cigarettes     Quit date: 2022     Years since quittin.2    Smokeless tobacco: Current    Tobacco comments:     Dipping    Substance Use Topics    Alcohol use: Yes      Family History   Family history unknown: Yes       No Known Allergies   Prior to Admission medications    Medication Sig Start Date End Date Taking? Authorizing Provider   amoxicillin-clavulanate (AUGMENTIN) 875-125 MG per tablet Take 1 tablet by mouth 2 times daily   Yes Provider, MD Oumou   lisinopril (PRINIVIL;ZESTRIL) 20 MG tablet Take 1 
  Hospital Medicine Progress Note      Date of Admission: 3/12/2024  Hospital Day: 2    Chief Admission Complaint:  Weakness/altered mentation      Subjective:  patient seen and examined this morning.  No acute events overnight.  Sodium has been gradually increasing 119 this morning.  Resting in bed somewhat difficult to arouse this morning.  No signs of alcohol withdrawal.        Assessment/Plan:    #Severe hyponatremia  #Chronic hyponatremia  Differentials including Beerpotomania, SIADH,  Sodium 118 on admission.  Corrected 119 with hypertonic saline  Started on NS 50 cc/h  Repeat BMP 2 PM  Plan to correct 0.5 mEq/h/no more than 10 within 24 hours  Monitor BMP every 4 hours     #Hyperkalemia   EKG with no signs of T wave inversion  Resolved  Continue monitor electrolytes      #Acute encephalopathy  Likely in the setting of hyponatremia, alcohol use  [facility] protocol     #Hypertension     #COPD-no exacerbation, continue inhaler, DuoNebs as needed and scheduled     # Obstructive sleep apnea     #Alcohol abuse-[facility] protocol     #Nicotine dependence-nicotine patch     #Rheumatoid arthritis ankylosing spondylitis     #Dysphagia-plan for EGD in the outpatient with Dr. Pelayo on Friday     # Moderate caloric malnutrition-nutrition consultation, Ensure supplements with meals          Physical Exam Performed:      General appearance: Lethargic difficult to arouse  Respiratory:  Normal respiratory effort.   Cardiovascular:  Regular rate and rhythm.  Abdomen:  Soft, non-tender, non-distended.  Musculoskeletal:  No edema  Neurologic:  Non-focal  Psychiatric: Lethargic difficult to arouse but able to communicate with eyes closed.    /65   Pulse 99   Temp 98 °F (36.7 °C) (Axillary)   Resp 20   Ht 1.727 m (5' 8\")   Wt 69.9 kg (154 lb 1.6 oz)   SpO2 97%   BMI 23.43 kg/m²     Diet: Diet NPO  DVT Prophylaxis: [x]PPx LMWH  []SQ Heparin  []IPC/SCDs  []Eliquis  []Xarelto  []Coumadin  []Other -      Code status: Full Code  PT/OT 
  Hospital Medicine Progress Note      Date of Admission: 3/12/2024  Hospital Day: 4    Chief Admission Complaint:  Weakness/altered mentation      Subjective: Patient seen and examined this morning.  He is more alert and awake this morning.  Has not required benzodiazepines.    Assessment/Plan:      #Severe hyponatremia  #Chronic hyponatremia  Differentials including Beerpotomania, poor nutrition  -Low urine sodium unlikely SIADH  -Sodium gradually improving 126 this morning  -Normal saline 75 cc/h  -Sodium chloride tablets 2 g twice daily  -Monitor electrolytes daily  Nephrology following       #Dysphagia  -plan for EGD in the outpatient with Dr. Pelayo on on 3/15  -Will consult Dr. Pelayo for possible inpatient endoscopy  If endoscopy not able to be performed we will consider NG tube for feeding but he is unable to tolerate p.o.  Speech on board  GI following    #Alcohol abuse-CIWA protocol with Ativan, multivitamins    #Hyperkalemia-resolved    #Acute encephalopathy  Likely in the setting of hyponatremia, alcohol use  Difficult to arouse this morning.    -Improving.  He is more alert and awake this morning.    #Hypertension BP stable off medications     #COPD-no exacerbation, continue inhaler, DuoNebs as needed and scheduled     # Obstructive sleep apnea       #Nicotine dependence-nicotine patch     #Rheumatoid arthritis ankylosing spondylitis       #Moderate caloric malnutrition-nutrition consultation, Ensure supplements with meals          Physical Exam Performed:      General appearance: Lethargic difficult to arouse  Respiratory:  Normal respiratory effort.   Cardiovascular:  Regular rate and rhythm.  Abdomen:  Soft, non-tender, non-distended.  Musculoskeletal:  No edema  Neurologic:  Non-focal  Psychiatric: Difficult to arouse today examined to noxious stimuli.    /63   Pulse (!) 118   Temp 97.9 °F (36.6 °C) (Oral)   Resp 20   Ht 1.727 m (5' 8\")   Wt 72.5 kg (159 lb 13.3 oz)   SpO2 100%   BMI 24.30 
  Hospital Medicine Progress Note      Date of Admission: 3/12/2024  Hospital Day: 5    Chief Admission Complaint:  Weakness/altered mentation      Subjective: Patient seen examined this morning.  No acute events overnight.  Gone into A-fib with RVR yesterday.  Started on heparin drip as well as Cardizem.  Slightly better.  Converted to sinus  .  He is more alert and awake this morning.  Remains in sinus tachycardia    Assessment/Plan:      #Severe hyponatremia  #Chronic hyponatremia  Differentials including Beerpotomania, poor nutrition  -Low urine sodium unlikely SIADH  -Sodium gradually improving 131 this morning  -Continue normal saline 75 cc/h  -Salt tabs 2 g twice daily  -Monitor electrolytes daily  -Nephrology following       #Dysphagia  - He is able to tolerate full liquid diet  -Noted supplements with meals  -Continue IV fluid resuscitation  -Possible EGD on Monday  GI following      # A-fib with RVR  Converted to sinus tachycardia  Will start p.o. metoprolol 25 mg twice daily  Continue heparin drip for the time being  Continue monitor telemetry    #Alcohol abuse-CIWA protocol with Ativan, multivitamins    #Hyperkalemia-resolved    #Acute encephalopathy-improving  Likely in the setting of hyponatremia, alcohol use    He is more alert and awake this morning.    #Hypertension BP stable off medications     #COPD-no exacerbation, continue inhaler, DuoNebs as needed and scheduled     # Obstructive sleep apnea       #Nicotine dependence-nicotine patch     #Rheumatoid arthritis ankylosing spondylitis     #Moderate caloric malnutrition-nutrition consultation, Ensure supplements with meals  Continue with mild thick diet          Physical Exam Performed:      General appearance: Alert awake oriented, no acute distress  Respiratory:  Normal respiratory effort.   Cardiovascular:  Regular rate and rhythm.  Abdomen:  Soft, non-tender, non-distended.  Musculoskeletal:  No edema  Neurologic:  Non-focal  Psychiatric: Alert 
  Hospital Medicine Progress Note      Date of Admission: 3/12/2024  Hospital Day: 7    Chief Admission Complaint:  Weakness/altered mentation      Subjective: Patient seen examined this morning.  No acute events overnight.  Resting in bed comfortably    Assessment/Plan:      #Severe hyponatremia  #Chronic hyponatremia  Differentials including Beerpotomania, poor nutrition  -Low urine sodium unlikely SIADH  -Sodium level at 129  -IV fluids held yesterday  -Salt tabs 2 g twice daily  -Continue monitor sodium level  -Nephrology following       #Dysphagia  Tolerating full liquid diet and supplements with meals  -Plan for EGD on 3/18  -GI following      # A-fib with RVR  Rate has been much better controlled  Will start p.o. metoprolol 25 mg twice daily  Continue heparin drip for now>> transition to p.o. anticoagulation after EGD  Continue monitor telemetry    #Alcohol abuse-CIWA protocol with Ativan, multivitamins  Stable has not required Ativan    #Hyperkalemia-resolved    #Acute encephalopathy-improving  -Likely in the setting of hyponatremia, alcohol use  -More alert awake    #Hypertension- BP stable off medications     #COPD  -no exacerbation, continue inhaler, DuoNebs as needed and scheduled     # Obstructive sleep apnea       #Nicotine dependence-nicotine patch     #Rheumatoid arthritis ankylosing spondylitis     #Moderate caloric malnutrition-nutrition consultation, Ensure supplements with meals  Continue with mild thick diet      Discharge planning once EGD is done.  PT recommending home with home health    Physical Exam Performed:      General appearance: Alert awake oriented, no acute distress  Respiratory:  Normal respiratory effort.   Cardiovascular:  Regular rate and rhythm.  Abdomen:  Soft, non-tender, non-distended.  Musculoskeletal:  No edema  Neurologic:  Non-focal  Psychiatric: Alert and oriented, no focal deficits    /62   Pulse 88   Temp 97.5 °F (36.4 °C) (Axillary)   Resp 18   Ht 1.727 m 
  Hospital Medicine Progress Note      Date of Admission: 3/12/2024  Hospital Day: 8    Chief Admission Complaint:  Weakness/altered mentation      Subjective: Patient seen in follow-up for hyponatremia, alcohol use, dysphagia.  He just underwent EGD.  He denies any hematemesis or chest pain.  No nausea or abdominal pain.  He is asking when he can go home.  We discussed the need for ENT evaluation given the findings on EGD    Assessment/Plan:      #Severe hyponatremia, acute on chronic  Likely due to significant alcohol use and poor p.o. intake  May be a component of SIADH SIADH  Initially sodium 108 now up to 131.  Appreciate nephrology assistance  -Salt tabs 2 g twice daily and fluid restriction.  Continue slow rate normal saline  -Continue monitor sodium level       #Dysphagia with oropharyngeal ulceration  Tolerating full liquid diet and supplements with meals  -Underwent EGD that did show varices as well as a bleeding oropharyngeal ulceration.  -GI following  -Continue PPI  -ENT consultation      # A-fib with RVR  Rate has been much better controlled  Continue p.o. metoprolol 25 mg twice daily  Had been on heparin infusion for hypercoag state due to A-fib but will hold given the findings on EGD  Continue monitor on telemetry    #Alcohol abuse with cirrhosis-CIWA protocol with Ativan, multivitamins  Stable has not required Ativan  Continue thiamine  Ultrasound of abdomen shows cirrhosis mild ascites.  EGD with varices  GI following    #Hyperkalemia-resolved    #Acute encephalopathy-improving  -Likely in the setting of hyponatremia, alcohol use  -More alert awake    #Hypertension- BP stable off medications     #COPD  -no exacerbation, continue inhaler, DuoNebs as needed and scheduled     # Obstructive sleep apnea -not on CPAP     #Nicotine dependence-nicotine patch     #Rheumatoid arthritis ankylosing spondylitis     # Severe caloric malnutrition-nutrition consultation, Ensure supplements with meals  Continue 
  Hospital Medicine Progress Note      Date of Admission: 3/12/2024  Hospital Day: 9    Chief Admission Complaint:  Weakness/altered mentation      Subjective: Patient seen in follow-up for hyponatremia, alcohol use, dysphagia.  He just came back from his modified barium swallow.  I talked with speech therapy he noted pretty severe dysphagia as well as oropharyngeal edema.  He has also been evaluated by ENT who plans a endoscopy today  Patient continues to minimize his dysphagia saying he can chew up his food multiple times and\" get it down\".  However I reminded him that he presented with severe dehydration and has significant malnutrition.    Assessment/Plan:      #Severe hyponatremia, acute on chronic  Likely due to significant alcohol use and poor p.o. intake  May be a component of SIADH   Initially sodium 108 now up to 131.  Appreciate nephrology assistance  -Salt tabs 2 g twice daily and fluid restriction.  Continue slow rate normal saline  -Continue monitor sodium level       #Dysphagia with oropharyngeal ulceration  Tolerating full liquid diet and supplements with meals but he has oropharyngeal edema and significant dysphagia.  I discussed at length with speech therapy after his modified barium swallow.  Patient remains adamant that he is going to eat.  He is not averse to a PEG tube for nutritional supplementation but he wants to wait on this and do it as an outpatient  ENT recommended CT of neck soft tissue with contrast  Also plan for laryngoscopy to evaluate the oropharyngeal ulceration      # A-fib with RVR  Rate has been much better controlled  Continue p.o. metoprolol 25 mg twice daily  Had been on heparin infusion for hypercoag state due to A-fib but will hold given the findings on EGD  Continue monitor on telemetry    #Alcohol abuse with cirrhosis-[facility] protocol with Ativan, multivitamins  Stable has not required Ativan  Continue thiamine  Ultrasound of abdomen shows cirrhosis mild ascites.  EGD with 
  Physician Progress Note      PATIENT:               ABIODUN NELSON  CSN #:                  [id number]  :                       1970  ADMIT DATE:       3/12/2024 12:08 PM  DISCH DATE:  RESPONDING  PROVIDER #:        Eliud Calzada MD        QUERY TEXT:    Type of Encephalopathy: Please provide further specificity, if known.    Clinical indicators include: hyponatremia, acute, encephalopathy, alcohol use,   alcohol abuse, ams, intracranial hemorrhage, alcohol withdrawal  Options provided:  -- Anoxic/hypoxic encephalopathy  -- Metabolic encephalopathy  -- Toxic encephalopathy  -- Hepatic encephalopathy  -- Hypertensive encephalopathy  -- Other - I will add my own diagnosis  -- Disagree - Not applicable / Not valid  -- Disagree - Clinically Unable to determine / Unknown        PROVIDER RESPONSE TEXT:    The patient has metabolic encephalopathy.          QUERY TEXT:    Pt admitted with hyponatremia, encephalopathy.  Pt noted to have COPD   currently requiring 02 4lpm NC. If possible, please document in the progress   notes and discharge summary if you are evaluating and/or treating any of the   following:    The medical record reflects the following:  Risk Factors:  -53 M presents with altered mental status, dysphagia  -admitted with hyponatremia, encephalopathy, dysphagia    Clinical Indicators:  -patient with COPD  -on arrival, 02 sats 95% on room air; RR 20  -on 3/12/24 1600, patient noted to be on 02 2lpm NC with 02 sats 93...96%; RR   18-24  -subsequently weaned to room air with 02 sats %; RR 15-30  -on 3/13/24 0830, patient placed on 02 4lpm NC with 02 sats 95%; RR 22;   subsequent RRs up to 31  -subsequently weaned to 2lpm NC then 1lpm NC with 02 sats %; RRs up to   29  -subsequently increased to 4lpm NC with 02 sats 96% and RR 29  -02 sats on 02 4lpm NC ranging % with RRs ranging 17-30    Treatment:  -labs, imaging, 02, IVF, Ativan, ST eval    Thank you,  Vanessa Malik, JUANJOSEN, RN, CCDS, 
  Physician Progress Note      PATIENT:               ABIODUN NELSON  CSN #:                  [id number]  :                       1970  ADMIT DATE:       3/12/2024 12:08 PM  DISCH DATE:  RESPONDING  PROVIDER #:        Manju Cardona MD          QUERY TEXT:    Patient admitted with hyponatremia, metabolic encephalopathy, malnutrition,   alcohol abuse.   Noted documentation of moderate malnutrition by Attending and   severe malnutrition by registered dietician.  If possible, please document in progress notes and discharge summary if you   are evaluating and /or treating any of the following:    The medical record reflects the following:  Risk Factors:  -53 M presents with severe hyponatremia, metabolic encephalopathy,   hyperkalemia, malnutrition, alcohol abuse    Clinical Indicators:  -Attending documentation notes patient with moderate malnutrition  -per malnutrition assessment documented by registered dietician 3/13/24,   patient with severe malnutrition in the context of chronic illness AEB: 75% or   less estimated energy requirements for 1 month or longer, weight loss >5%   over 1 month  -total protein 6.6 -> 5.2  -albumin 2.4 -> 2.0  -Na on admit 108    Treatment:  -labs, IV fluids, 3% NaCl IVF, Sodium Chloride tablets, Kcl, Thiamine, MVI, RD   consult, Nephrology consult, ST eval/treat, EGD and MBS pending, modified   diet    Thank you,  Vanessa Malik, JUANJOSEN, RN, CCDS, CRCR  [email or web address]   [email or web address] or contact via [employer]  Options provided:  -- Moderate malnutrition confirmed and severe malnutrition ruled out  -- Severe malnutrition confirmed and moderate malnutrition ruled out  -- Other - I will add my own diagnosis  -- Disagree - Not applicable / Not valid  -- Disagree - Clinically unable to determine / Unknown  -- Refer to Clinical Documentation Reviewer    PROVIDER RESPONSE TEXT:    After study, severe malnutrition confirmed and moderate malnutrition ruled 
 made second attempt to visit the pt. Conferred with RN, Bruno. Pt is asleep.   provided the ministry of presence and left pastoral msg notifying pt of attempt.    Rev. JOSE ANTONIO Valdez can be reached by calling the  at [facility]  [phone]     
2006  Dilt gtt started at the lowest ordered dose of 5 mg/hr.  BP stable at this time, currently 103/60.    2015  Patient requesting his home medications (trazodone, vyvanse, and oxycodone) be restarted or he will leave the hospital AMA.  Messaged , who restarted the patient's oxy and trazodone, but vyvanse is not in formulary.  Spoke with pharmDGlenna, who stated pharmacy would auto substitute adderall 10 mg, relayed info to .  Per , relay message to day shift team in the morning.      2050  Dilt gtt stopped due to significant drop in BP, currently 77/52.    2120   notified that patient's BP did not tolerate the Dilt gtt and the gtt has been stopped.  BP has come up, but remains soft.  Received order for 1L NS bolus.    2220   notified that BP is 74/50 despite bolus currently infusing.  Received order for another 1L NS bolus.  MD also ordered a CXR.      2345  Patient's HR was sustaining in the 140's and BP had come up to 120's.  Attempted to restart Dilt gtt at the lowest dose of 2.5 mg/hr, but patient's BP dropped significantly to 69/54.   notified.  Per MD, d/c dilt gtt, monitor HR as long as it's below 150, and give another 1L NS bolus if BP does not come up.      0105  Patient's rhythm converted to sinus tachy.  BP has stabilized.    0318  Patient's morning Mag was 1.3.  Per electrolyte replacement protocol, will administer 4g Mag Sulfate.      0400  Patient has not voided much.  Bladder scan only shows 158 mL of urine in bladder.  Will continue to monitor.    
2030:  Received patient from ED, hypertonic 3% running at 65ml/hr.  Last sodium collected at 1845 results of 120.  Per nephology, Dr. Peralta, will discontinue 3%, start D5W at 50 for 2 hrs, with recheck sodium levels.     2245:  Repeat sodium came back 115, on call nephrologist, Dr. Crawford informed, will discontinue D5W, repeat sodium in 4 hrs.      0000:  Resting comfortable in bed, no events      0545:  patient became extremely anxious and aggressive, confused and crying demanding to go home,  States he's having an anxiety attack.  Multiple attempts made to redirect patient without success.  CIWA evaluation noted to be 21, patient given 2 mg of ativan.  Refusing to go back into bed.  Will monitor. Still awaiting sodium level results.     0645:  patient resting comfortable in bedside chair.    
4 Eyes Skin Assessment     NAME:  Oumar Merida III  YOB: 1970  MEDICAL RECORD NUMBER:  [id number]    The patient is being assessed for  Other Two person skin assessment    I agree that at least one RN has performed a thorough Head to Toe Skin Assessment on the patient. ALL assessment sites listed below have been assessed.      Areas assessed by both nurses:    Head, Face, Ears, Shoulders, Back, Chest, Arms, Elbows, Hands, Sacrum. Buttock, Coccyx, Ischium, Legs. Feet and Heels, and Under Medical Devices         Does the Patient have a Wound? Yes wound(s) were present on assessment. LDA wound assessment was Initiated and completed by RN. Patient has a wound to his sacrum, bruises seen to his lower extremities.       Javi Prevention initiated by RN: Yes  Wound Care Orders initiated by RN: Yes    Pressure Injury (Stage 3,4, Unstageable, DTI, NWPT, and Complex wounds) if present, place Wound referral order by RN under : No    New Ostomies, if present place, Ostomy referral order under : No     Nurse 1 eSignature: Electronically signed by Melanie Zuniga RN on 3/20/24 at 1:15 AM EDT    **SHARE this note so that the co-signing nurse can place an eSignature**    Nurse 2 eSignature: Electronically signed by Odette Mohr RN on 3/20/24 at 1:38 AM EDT   
4 Eyes Skin Assessment     NAME:  Oumar Merida III  YOB: 1970  MEDICAL RECORD NUMBER:  [id number]    The patient is being assessed for  Transfer to New Unit    I agree that at least one RN has performed a thorough Head to Toe Skin Assessment on the patient. ALL assessment sites listed below have been assessed.      Areas assessed by both nurses:    Head, Face, Ears, Shoulders, Back, Chest, Arms, Elbows, Hands, Sacrum. Buttock, Coccyx, Ischium, Legs. Feet and Heels, and Under Medical Devices         Does the Patient have a Wound? Yes wound(s) were present on assessment. LDA wound assessment was Initiated and completed by RN       Javi Prevention initiated by RN: Yes  Wound Care Orders initiated by RN: Yes    Pressure Injury (Stage 3,4, Unstageable, DTI, NWPT, and Complex wounds) if present, place Wound referral order by RN under : Yes    New Ostomies, if present place, Ostomy referral order under : No     Nurse 1 eSignature: Electronically signed by Trinity Marin RN on 3/13/24 at 12:42 AM EDT    **SHARE this note so that the co-signing nurse can place an eSignature**    Nurse 2 eSignature:  Tu Harvey RN  
At 1500 reassessment pt became agitated upon learning/coming to understand that he would not be discharged today. I reached out to attending to come talk to pt. CIWA scored and meds administered. Currently, pt is calm cooperative. Pt is requesting meds to help him sleep tonight. Pt is also requesting stronger pain meds (pt reported to nurse that he takes Oxycodone at home when his neck starts to hurt). I have perfect served the attending regarding all the above. I am awaiting response from MD. Will continue to monitor.  
Attempted PT 0918 however pt off the floor for x-ray at this time. Will continue to follow and attempt at a later time. Thank you.  
Awaiting aptt results  
Bedside shift report was given to CINDY Zavaleta.  
Bedside shift report was given to CINDY Zavaleta.    
Called GI to ask about EGD. RN informed that it is planned for tomorrow and patient can eat prior diet for dinner then return back to NPO after midnight. Secure message sent to  asking about heparin drip and hours needing to be stopped prior to procedure and order needs to be placed. Awaiting message back.  
Chart reviewed, EGD results noted and clinician spoke with Dr. Kennedy. Per Dr. Kennedy, will hold MBS at this time and keep pt NPO pending ENT consult. Will cont to follow and complete MBS if/as indicated pending further medical assessment.    
Clinician spoke with endo staff, who reports EGD has been postponed until 3- due to pt's sodium levels.  Continue to recommend MBS; however, would prefer to complete MBS after EGD is completed given nature and severity of pt's dysphagia.  If EGD remains unable to be completed due to medical status, will proceed with MBS. Orders obtained from Dr. Calzada. Will plan for MBS 3- if pt appropriate to participate.  Most recent SLP diet recs are minced and moist/ mildly thick. Recommend resume diet when medically appropriate.  Pt currently with NPO orders.    
Comprehensive Nutrition Assessment    Type and Reason for Visit:  Reassess    Nutrition Recommendations/Plan:   Continue current diet per SLP recs  Ensure 2x/day   Monitor po intake, labs, wt, GI symptoms      Malnutrition Assessment:  Malnutrition Status:  Severe malnutrition (03/13/24 1422)    Context:  Chronic Illness     Findings of the 6 clinical characteristics of malnutrition:  Energy Intake:  75% or less estimated energy requirements for 1 month or longer  Weight Loss:  Greater than 5% over 1 month     Body Fat Loss:  No significant body fat loss     Muscle Mass Loss:  No significant muscle mass loss    Fluid Accumulation:  No significant fluid accumulation     Strength:  Not Performed    Nutrition Assessment:    Admitted for hyponatremia syndrome, AMS. RD consulted for poor appetite/intakes, also screened for MST 2 d/t noted wt loss and poor intakes PTA. Pt inappropriate for interview at visit x2 attempt, asleep w/ difficulty arousing likely d/t ativan given earlier this a.m. Pt NPO since admission to ICU floor(1 d). Per MD's note, concern for malnutrition and desiring ONS. Per chart review, family endorsed Pt w/ oropharyngeal dysphagia >1 yr, scheduled for EGD on 3/15 in OP w/ GI MD. WIZ=216# x1 mth(6% wt loss x1mth), 167# x6 mths. (3/18) F/u. Pt NPO earlier 2/2 EGD, now postponed. SLP approved for M/M diet. Pt receiving care at time of visit, RD to initiate ONS and follow per protocol. Labs: Na 128, K 3.2,  Meds: MVI, B1, ativan, duoneb, K, Mg    Nutrition Related Findings:    NFPE deferred, observed as well nourished, no obvious wasting. No N/V/D/C reported. +oropharyngeal dysphagia per GI MD note 1 mth ago M/M diet with [place] liqs per SLP. Last BM 3/18. No edema. Wound Type: Stage I, Pressure Injury       Current Nutrition Intake & Therapies:    Average Meal Intake: 51-75%  Average Supplements Intake: None Ordered  ADULT DIET; Dysphagia - Minced and Moist; Mildly Thick (Nectar)  Diet NPO 
Discharge instruction and prescriptions discussed patient verbalized understanding. AVS given to patient. Escorted to exit via wheelchair by tech family member here to take patient home.  
Discharge paperwork completed, printed and given to primary nurse.  
Heparin Infusion Initiation  Oumar Merida III is a 54 y.o. male starting heparin for:  atrial fibrillation  Heparin dosing: order for weight based protocol  Initial Dosing Weight: 72.5 kg (Recorded body weight)  Recent Labs     03/13/24  1430      HGB 10.2*     Factor Xa inhibitor/LMWH use within the past 72 hours? Yes  If yes, date and time of last administration:  3/15 @ 1824  Hypertriglyceridemia (> 690 mg/dL) or hyperbilirubinemia (> 37 mg/dL conjugated bilirubin, >14 mg/dL unconjugated bilirubin) present? No  Recent Labs     03/15/24  0343   BILITOT 1.2*       Assessment/Plan:   Heparin to be monitored using aPTT until 72 hours following last factor Xa inhibitor/LMWH administration, anticipated date for change to anti-Xa monitoring is 3/19  Initial bolus ordered: Yes  Starting rate:  12 unit/kg/hr  PRN boluses entered: Yes       
IP WOUND CONSULT    Oumar Merida III  MEDICAL RECORD NUMBER:  [id number]  AGE: 54 y.o.   GENDER: male  : 1970  TODAY'S DATE:  3/21/2024    GENERAL     [x] Follow-up   [] New Consult    Oumar Merida III is a 54 y.o. male referred by:   [x] Physician  [] Nursing  [] Other:         PAST MEDICAL HISTORY    Past Medical History:   Diagnosis Date    Ankylosing spondylitis (HCC)     COPD (chronic obstructive pulmonary disease) (HCC)     Hypertension     Hyponatremia     SIADH (syndrome of inappropriate ADH production) (HCC)         PAST SURGICAL HISTORY    Past Surgical History:   Procedure Laterality Date    CERVICAL LAMINECTOMY      C6 laminectomy    HIP ARTHROPLASTY      left hip    SPINAL FUSION      cervico thoracic fusion C2-T4    UPPER GASTROINTESTINAL ENDOSCOPY N/A 3/19/2024    ESOPHAGOGASTRODUODENOSCOPY performed by Adriana Kennedy MD at [facility] ENDOSCOPY       FAMILY HISTORY    Family History   Family history unknown: Yes         ALLERGIES    No Known Allergies    MEDICATIONS    No current facility-administered medications on file prior to encounter.     Current Outpatient Medications on File Prior to Encounter   Medication Sig Dispense Refill    venlafaxine (EFFEXOR) 75 MG tablet Take 1 tablet by mouth nightly      traZODone (DESYREL) 50 MG tablet Take 1 tablet by mouth nightly as needed for Sleep      SPIRIVA RESPIMAT 2.5 MCG/ACT AERS inhaler Inhale 2 puffs into the lungs daily      SYMBICORT 80-4.5 MCG/ACT AERO Inhale 2 puffs into the lungs in the morning and 2 puffs in the evening.      Adalimumab (HUMIRA PEN) 40 MG/0.4ML PNKT Inject 40 mg into the skin every 14 days      albuterol sulfate HFA (PROVENTIL;VENTOLIN;PROAIR) 108 (90 Base) MCG/ACT inhaler Inhale 2 puffs into the lungs every 4 hours as needed      thiamine 100 MG tablet Take 1 tablet by mouth daily            /66   Pulse 93   Temp 98.6 °F (37 °C) (Oral)   Resp 18   Ht 1.727 m (5' 7.99\")   Wt 74.1 kg (163 lb 5.8 oz)   SpO2 
OCCUPATIONAL THERAPY EVALUATION  Patient: Oumar Merida III (54 y.o. male)  Date: 3/18/2024  Primary Diagnosis: Hyponatremia syndrome [E87.1]  Procedure(s) (LRB):  ESOPHAGOGASTRODUODENOSCOPY BIOPSY (N/A)     Precautions: Fall Risk, Bed Alarm                Recommendations for nursing mobility: AD and gt belt for bed to chair , Amb to bathroom with AD and gait belt, and Assist x1    In place during session:Peripheral IV, Nasal Cannula 1.5-2L, and EKG/telemetry   ASSESSMENT  Pt is a 54 y.o. male presenting [facility] with c/o weakness and altered mental status, admitted 3/12 and currently being treated for severe hyponatremia and A-fib w/ RVR. Pt received semi-supine in bed upon arrival, AXO x4, and agreeable to OT evaluation.     Based on current observations, pt presents with decreased  functional mobility, independence in ADLs, ROM, strength, activity tolerance, endurance, balance, increased pain levels (see below for objective details and assist levels).     Overall, pt tolerates session fair w/out c/o SOB, dizziness, or pain. Pt req'd SBA-CGA for all functional mobility including OOB mobility using RW; slight shakiness but no LOB noted. Pt demo'd good balance w/ RW while turning in room; without anticipate increased unsteadiness and falls risk. Pt declined commode transfer; reports recently completing BSC transfer w/ nsg w/ SBA/CGA. Slight SOB w/ activity; pt declined concerns at this time. SpO2 decreased to 85% w/ activity and req'd 1-2 mins to recover to 90-91% at EOB. Pt will benefit from continued skilled OT services to address current impairments and improve IND and safety with self cares and functional transfers/mobility. Potential barriers for safe discharge: pt is a high fall risk. Current OT d/c recommendation Home with Home Health Therapy and family assist once medically appropriate. Pt would benefit from RW for increased safety w/ mobility/ADLs.      Start of Session End of Session   SPO2 (%) 88-89 
Pt converted to A. Fib. Pt drowsy but but easy to arouse. Responds to voice and follows commands. EKG obtained and confirmed A. Fib. Spoke with Dr. Calzada while he was on the unit and received med orders for HR control. Lopressor administered (see MAR). Will continue to monitor.   
Pt still in A.fib and in pain. Sent message to attending via perfect serve. Awaiting response from MD  
RT Nebulizer Bronchodilator Protocol Note    There is a bronchodilator order in the chart from a provider indicating to follow the RT Bronchodilator Protocol and there is an “Initiate RT Bronchodilator Protocol” order as well (see protocol at bottom of note).    CXR Findings:  No results found.    The findings from the last RT Protocol Assessment were as follows:  Smoking: Chronic pulmonary disease  Respiratory Pattern: Regular pattern and RR 12-20 bpm  Breath Sounds: Slightly diminished and/or crackles  Cough: Strong, spontaneous, non-productive  Indication for Bronchodilator Therapy:    Bronchodilator Assessment Score: 4    Aerosolized bronchodilator medication orders have been revised according to the RT Nebulizer Bronchodilator Protocol below.    Respiratory Therapist to perform RT Therapy Protocol Assessment initially then follow the protocol.  Repeat RT Therapy Protocol Assessment PRN for score 0-3 or on second treatment, BID, and PRN for scores above 3.    No Indications - adjust the frequency to every 6 hours PRN wheezing or bronchospasm, if no treatments needed after 48 hours then discontinue using Per Protocol order mode.     If indication present, adjust the RT bronchodilator orders based on the Bronchodilator Assessment Score as indicated below.  If a patient is on this medication at home then do not decrease Frequency below that used at home.    0-3 - enter or revise RT bronchodilator order(s) to equivalent RT Bronchodilator order with Frequency of every 4 hours PRN for wheezing or increased work of breathing using Per Protocol order mode.       4-6 - enter or revise RT Bronchodilator order(s) to two equivalent RT bronchodilator orders with one order with BID Frequency and one order with Frequency of every 4 hours PRN wheezing or increased work of breathing using Per Protocol order mode.         7-10 - enter or revise RT Bronchodilator order(s) to two equivalent RT bronchodilator orders with one order 
Received bedside shift report at 0730. I trace performed. Left arm swollen at shift change. Left PIV infiltrated with NS. NS infusion stopped Left arm elevated. Right PIV assessed and NS restarted via Right PIV. No complaints offered by pt at this time. Pt VSS. Full assessment to follow.   
Renal Progress Note    Patient: Oumar Merida III MRN: [id number]  SSN: [id number]    YOB: 1970  Age: 54 y.o.  Sex: male      Admit Date: 3/12/2024    LOS: 7 days     Subjective:   Patient seen at bedside. Alert and awake, no acute distress.   No edema in LEs  No new c/o today  Repeat labs done today showed a low sodium of 131 and a low potassium of 3.7      Current Facility-Administered Medications   Medication Dose Route Frequency    0.9% NaCl with KCl 20 mEq infusion   IntraVENous Continuous    metoprolol tartrate (LOPRESSOR) tablet 25 mg  25 mg Oral BID    metoprolol (LOPRESSOR) injection 5 mg  5 mg IntraVENous PRN    traZODone (DESYREL) tablet 50 mg  50 mg Oral Nightly    ipratropium 0.5 mg-albuterol 2.5 mg (DUONEB) nebulizer solution 1 Dose  1 Dose Inhalation Q6H WA RT    Petrolatum-Zinc Oxide ointment   Topical TID    sodium chloride flush 0.9 % injection 5-40 mL  5-40 mL IntraVENous 2 times per day    sodium chloride flush 0.9 % injection 5-40 mL  5-40 mL IntraVENous PRN    0.9 % sodium chloride infusion   IntraVENous PRN    potassium chloride 20 mEq/50 mL IVPB (Central Line)  20 mEq IntraVENous PRN    Or    potassium chloride 10 mEq/100 mL IVPB (Peripheral Line)  10 mEq IntraVENous PRN    magnesium sulfate 2000 mg in 50 mL IVPB premix  2,000 mg IntraVENous PRN    ondansetron (ZOFRAN-ODT) disintegrating tablet 4 mg  4 mg Oral Q8H PRN    Or    ondansetron (ZOFRAN) injection 4 mg  4 mg IntraVENous Q6H PRN    polyethylene glycol (GLYCOLAX) packet 17 g  17 g Oral Daily PRN    acetaminophen (TYLENOL) tablet 650 mg  650 mg Oral Q6H PRN    Or    acetaminophen (TYLENOL) suppository 650 mg  650 mg Rectal Q6H PRN    albuterol sulfate HFA (PROVENTIL;VENTOLIN;PROAIR) 108 (90 Base) MCG/ACT inhaler 2 puff  2 puff Inhalation Q4H PRN    thiamine mononitrate tablet 100 mg  100 mg Oral Daily    budesonide-formoterol (SYMBICORT) 80-4.5 MCG/ACT inhaler 2 puff  2 puff Inhalation BID RT    [Held by provider] 
Renal Progress Note    Patient: Oumar Merida III MRN: [id number]  SSN: [id number]    YOB: 1970  Age: 54 y.o.  Sex: male      Admit Date: 3/12/2024    LOS: 8 days     Subjective:   Patient seen at bedside. Alert and awake, no acute distress.   No edema in LEs  No new c/o today  Repeat labs done today showed a low sodium of 131 and a low potassium of 4.0      Current Facility-Administered Medications   Medication Dose Route Frequency    barium sulfate 40 % paste 30 mL  30 mL Oral ONCE PRN    pantoprazole (PROTONIX) tablet 40 mg  40 mg Oral QAM AC    0.9% NaCl with KCl 20 mEq infusion   IntraVENous Continuous    metoprolol tartrate (LOPRESSOR) tablet 25 mg  25 mg Oral BID    metoprolol (LOPRESSOR) injection 5 mg  5 mg IntraVENous PRN    traZODone (DESYREL) tablet 50 mg  50 mg Oral Nightly    ipratropium 0.5 mg-albuterol 2.5 mg (DUONEB) nebulizer solution 1 Dose  1 Dose Inhalation Q6H WA RT    Petrolatum-Zinc Oxide ointment   Topical TID    sodium chloride flush 0.9 % injection 5-40 mL  5-40 mL IntraVENous 2 times per day    sodium chloride flush 0.9 % injection 5-40 mL  5-40 mL IntraVENous PRN    0.9 % sodium chloride infusion   IntraVENous PRN    potassium chloride 20 mEq/50 mL IVPB (Central Line)  20 mEq IntraVENous PRN    Or    potassium chloride 10 mEq/100 mL IVPB (Peripheral Line)  10 mEq IntraVENous PRN    magnesium sulfate 2000 mg in 50 mL IVPB premix  2,000 mg IntraVENous PRN    ondansetron (ZOFRAN-ODT) disintegrating tablet 4 mg  4 mg Oral Q8H PRN    Or    ondansetron (ZOFRAN) injection 4 mg  4 mg IntraVENous Q6H PRN    polyethylene glycol (GLYCOLAX) packet 17 g  17 g Oral Daily PRN    acetaminophen (TYLENOL) tablet 650 mg  650 mg Oral Q6H PRN    Or    acetaminophen (TYLENOL) suppository 650 mg  650 mg Rectal Q6H PRN    albuterol sulfate HFA (PROVENTIL;VENTOLIN;PROAIR) 108 (90 Base) MCG/ACT inhaler 2 puff  2 puff Inhalation Q4H PRN    thiamine mononitrate tablet 100 mg  100 mg Oral Daily 
Renal Progress Note    Patient: Oumar Merida III MRN: [id number]  SSN: [id number]    YOB: 1970  Age: 54 y.o.  Sex: male      Admit Date: 3/12/2024    LOS: 9 days     Subjective:   Patient seen at bedside. Alert and awake, no acute distress.   No edema in LEs  No new c/o today  Repeat labs done today showed a low sodium of 131 and a low potassium of 4.0      No current facility-administered medications for this encounter.     Current Outpatient Medications   Medication Sig    metoprolol tartrate (LOPRESSOR) 25 MG tablet Take 1 tablet by mouth 2 times daily    magnesium oxide (MAG-OX) 400 (240 Mg) MG tablet Take 1 tablet by mouth 2 times daily    furosemide (LASIX) 40 MG tablet Take 1 tablet by mouth daily for 3 days    Starch, Thickening, PACK Take 1 packet by mouth 3 times daily (with meals)    pantoprazole (PROTONIX) 40 MG tablet Take 1 tablet by mouth in the morning and at bedtime    venlafaxine (EFFEXOR) 75 MG tablet Take 1 tablet by mouth nightly    traZODone (DESYREL) 50 MG tablet Take 1 tablet by mouth nightly as needed for Sleep    SPIRIVA RESPIMAT 2.5 MCG/ACT AERS inhaler Inhale 2 puffs into the lungs daily    SYMBICORT 80-4.5 MCG/ACT AERO Inhale 2 puffs into the lungs in the morning and 2 puffs in the evening.    Adalimumab (HUMIRA PEN) 40 MG/0.4ML PNKT Inject 40 mg into the skin every 14 days    albuterol sulfate HFA (PROVENTIL;VENTOLIN;PROAIR) 108 (90 Base) MCG/ACT inhaler Inhale 2 puffs into the lungs every 4 hours as needed    thiamine 100 MG tablet Take 1 tablet by mouth daily        Vitals:    03/21/24 0010 03/21/24 0359 03/21/24 0715 03/21/24 0748   BP: 139/68 137/64 137/66    Pulse: 87 97 93    Resp: 20 20 18    Temp: 98.8 °F (37.1 °C) 97.7 °F (36.5 °C) 98.6 °F (37 °C)    TempSrc: Oral Oral Oral    SpO2: 92% 92% 93% 97%   Weight:       Height:         Objective:   General: alert awake well-oriented, no acute distress.  HEENT: EOMI, no Icterus, no Pallor,  mucosa moist.  Neck: 
Sacral dressing dated for 3/17/24. Wound care completed and dressing still intact after. Bilateral heel dressings applied at 1700.  
Spiritual Care Assessment/Progress Note  [facility]    Name: Oumar Merida III MRN: [id number]    Age: 53 y.o.     Sex: male   Language: English     Date: 3/13/2024            Total Time Calculated: 14 min              Spiritual Assessment begun in [facility] [place] [facility]  Service Provided For:: Patient not available  Referral/Consult From:: Nurse  Encounter Overview/Reason : Attempted Encounter    Spiritual beliefs:      [] Involved in a keiko tradition/spiritual practice:      [] Supported by a keiko community:      [] Claims no spiritual orientation:      [] Seeking spiritual identity:           [] Adheres to an individual form of spirituality:      [x] Not able to assess:                Identified resources for coping and support system:   Support System: Unknown       [] Prayer                  [] Devotional reading               [] Music                  [] Guided Imagery     [] Pet visits                                        [] Other: (COMMENT)     Specific area/focus of visit   Encounter:    Crisis:    Spiritual/Emotional needs:    Ritual, Rites and Sacraments:    Grief, Loss, and Adjustments:    Ethics/Mediation:    Behavioral Health:    Palliative Care:    Advance Care Planning:      Plan/Referrals: Continue to visit, (comment) ( will make second attempt to visit the pt)    Narrative:  attempted to visit pt; pt is asleep.  will make second attempt this afternoon.    Rev. Mary Randolph MDIV   can be reached by calling the  at [facility]  [phone]         
Spo2 on 2lpm at rest 95%  Spoon room air at rest 82%  Spo2 on 2lpm with ambulation 86%  Increased O2 to 3lpm spo2 89%  Increasedto 4lpm spo2 92%  
Spoke to pt's mother Berkley Merida, reported that patient has pre-existing appointment this Friday 3/15 for endoscopy with Dr Pelayo.    Also reports that patient drinks 2-4 beers/day, was more in months prior, but has been limited by issues with esophagus.     Will reach out to primary team to inquire about possibility of GI consult to complete endoscopy while he is already in the hospital.  
Spoke w/ RN. Patient not appropriate for bedside swallow evaluation at this time due to alertness. Patient given Ativan around 6 am. RN to contact SLP if/when patient is alert and appropriate.   
Spoke with Dr. Calzada regarding sustained A. Fib and c/o neck, back, hip pain. MD to place needed orders. MD did give TORB to cancel transfer out of ICU orders to nurse. Will continue to monitor.   
Spoke with nsg, reports pt remains inappropriate for SLP assessment due to lethargy and poor arousal. Will cont to follow.   
Subjective:   Oumar Merida III   54 y.o.   1970     Otolaryngology Consult Note:  Reason for consult: Dysphagia    History of Present Illness:  Oumar Merida III is a 54 y.o. male with past medical history of COPD, HTN, hyponatremia, with dysphagia.    Patient has been having dysphagia for greater than 1 year.  Was previously evaluated by Dr. Harris on 9/14/2023.  Scope exam and CT scan at that time was consistent with cervical osteophytes likely contributing to dysphagia. Patient was following with rheumatology and orthospine for ankylosing spondylitis at that time.  Was referred to GI for evaluation and barium swallow was considered.    Patient now admitted for dysphagia resulting in severe hyponatremia.  Patient's hyponatremia now corrected EEG was concerning for a posterior pharyngeal wall ulcer.    Interval Hx:   3/20/24:   MBS completed today.  It shows severe pharyngeal dysphagia with penetration some aspiration.  Scope exam performed today confirms posterior cervical osteophytes obstructing upper esophageal inlet     Review of Systems  Consitutional: denies fever, excessive weight gain or loss.  Eyes: denies diplopia, eye pain.  Integumentary: denies new concerning skin lesions.  Ears, Nose, Mouth, Throat: denies except as per HPI.  Endocrine: denies hot or cold intolerance, increased thirst.  Respiratory: denies cough, hemoptysis, wheezing  Gastrointestinal: denies trouble swallowing, nausea, emesis, regurgitation  Musculoskeletal: denies muscle weakness or wasting  Cardiovascular: denies chest pain, shortness of breath  Neurologic: denies seizures, numbness or tingling, syncope  Hematologic: denies easy bleeding or bruising       Past Medical History:   Diagnosis Date    Ankylosing spondylitis (HCC)     COPD (chronic obstructive pulmonary disease) ([facility])     Hypertension     Hyponatremia     SIADH (syndrome of inappropriate ADH production) ([facility])      Past Surgical History:   Procedure 
deficits    BP 98/83   Pulse 95   Temp 98.3 °F (36.8 °C) (Oral)   Resp 16   Ht 1.727 m (5' 8\")   Wt 73.7 kg (162 lb 7.7 oz)   SpO2 100%   BMI 24.70 kg/m²     Diet: Diet NPO Exceptions are: Sips of Water with Meds  DVT Prophylaxis: Lovenox take PPx LMWH  []SQ Heparin  []IPC/SCDs  []Eliquis  []Xarelto  []Coumadin  []Other -      Code status: Full Code  PT/OT Eval Status:   []NOT yet ordered  [x]Ordered and Pending   []Seen with Recommendations for:  []Home independently  []Home w/ assist  []HHC  []SNF  []Acute Rehab    Anticipated Discharge Day/Date:  24-48hrs    Anticipated Discharge Location: [x]Home  []HHC  []SNF  []Acute Rehab  []ECF  []LTAC  []Hospice  []Other -      Consults:      IP CONSULT TO NEPHROLOGY  IP CONSULT TO NEPHROLOGY  IP CONSULT TO SOCIAL WORK  IP CONSULT TO DIETITIAN  IP CONSULT TO SPIRITUAL SERVICES  IP WOUND CARE NURSE CONSULT TO EVAL  IP CONSULT TO CASE MANAGEMENT  IP CONSULT TO GI        ------------------------------------------------------------------------------------------------------------------------------------------------------------------------    MDM      [] High (any 2)    A. Problems (any 1)  [] Acute/Chronic Illness/injury posing threat to life or bodily function:    [] Severe exacerbation of chronic illness:    ---------------------------------------------------------------------  B. Risk of Treatment (any 1)   [] Drugs/treatments that require intensive monitoring for toxicity include:    [] Change in code status:    [] Decision to escalate care:    [] Major surgery/procedure with associated risk factors:    ----------------------------------------------------------------------  C. Data (any 2)  [] Discussed current management and discharge planning options with Case Management.  [] Discussed management of the case with:    [] Telemetry personally reviewed and interpreted as documented above    [] Imaging personally reviewed and interpreted, includes:    [] Data Review 
at this time  Coordination of Nutrition Care: Continue to monitor while inpatient, Speech Therapy  Plan of Care discussed with: remote    Goals:  Previous Goal Met: No Progress toward Goal(s) (NPO for MBS)  Goals: Initiate PO diet, by next RD assessment, Meet at least 75% of estimated needs       Nutrition Monitoring and Evaluation:   Behavioral-Environmental Outcomes: None Identified  Food/Nutrient Intake Outcomes: Diet Advancement/Tolerance, Food and Nutrient Intake, Supplement Intake  Physical Signs/Symptoms Outcomes: Meal Time Behavior, Weight, Biochemical Data, Chewing or Swallowing, Fluid Status or Edema    Discharge Planning:    Too soon to determine     Kalina Marcus RD  Contact: [phone]      
normal skin turgor, no skin rashes.        Intake and Output:  Current Shift: No intake/output data recorded.  Last three shifts: 03/16 1901 - 03/18 0700  In: 4887.1 [P.O.:2630; I.V.:2257.1]  Out: 1500 [Urine:1500]      Lab/Data Review:  Recent Labs     03/16/24 0225 03/17/24 0357 03/18/24  0512   WBC 10.3 6.6 4.8   HGB 8.9* 8.3* 7.9*   HCT 27.4* 25.3* 24.5*   * 148* 132*     Recent Labs     03/15/24  1900 03/16/24  0225 03/16/24  0930 03/16/24 1955 03/17/24 0357 03/17/24  1800 03/18/24  0512   NA  --  131*  --    < > 130* 131* 129*   K  --  4.0  --    < > 3.5 3.9 3.2*   CL  --  96*  --    < > 94* 95* 92*   CO2  --  33*  --    < > 33* 32 35*   GLUCOSE  --  145*  --    < > 95 145* 94   BUN  --  10  --    < > 3* 2* 3*   CREATININE  --  0.29*  --    < > 0.18* 0.31* 0.23*   CALCIUM  --  8.1*  --    < > 7.8* 7.7* 8.0*   MG  --  1.3* 2.1  --  1.4*  --   --    LABALBU  --  2.2*  --   --  2.0*  --   --    BILITOT  --  0.7  --   --  0.6  --   --    AST  --  17  --   --  16  --   --    ALT  --  15  --   --  13  --   --    INR 1.1  --   --   --   --   --   --     < > = values in this interval not displayed.     No results for input(s): \"PHART\", \"RWC4PST\", \"PO2ART\", \"BTH2WKS\", \"BEART\", \"SUN4YNPE\", \"HGBART\", \"GK0EOLNBV\", \"FIO2A\", \"C6BWSTHB\", \"OXYHEM\", \"CARBOXHGBART\", \"METHGBART\", \"H8JGCTAUV\", \"PHCORART\", \"TEMP\" in the last 72 hours.    Invalid input(s): \"YIR6JQCKO\"  Recent Results (from the past 24 hour(s))   Basic Metabolic Panel    Collection Time: 03/17/24  6:00 PM   Result Value Ref Range    Sodium 131 (L) 136 - 145 mmol/L    Potassium 3.9 3.5 - 5.1 mmol/L    Chloride 95 (L) 97 - 108 mmol/L    CO2 32 21 - 32 mmol/L    Anion Gap 4 (L) 5 - 15 mmol/L    Glucose 145 (H) 65 - 100 mg/dL    BUN 2 (L) 6 - 20 mg/dL    Creatinine 0.31 (L) 0.70 - 1.30 mg/dL    Bun/Cre Ratio 6 (L) 12 - 20      Est, Glom Filt Rate >60 >60 ml/min/1.73m2    Calcium 7.7 (L) 8.5 - 10.1 mg/dL   APTT    Collection Time: 03/17/24 10:11 PM   Result 
03/12/24  1408   INR 1.0       Urine Cultures: No results found for: \"LABURIN\"  Blood Cultures: No results found for: \"BC\"  No results found for: \"BLOODCULT2\"  Organism: No results found for: \"ORG\"      Eliud Calzada MD  
30 degrees or less, if not contraindicated, to reduce pressure to buttocks and sacrum.  Raise foot of bed to help prevent friction and shear injury from sliding down in the bed.  Apply a chair cushion in chair/recliner when sitting to reduce pressure to buttocks when sitting.  When in chair, recommend patient shift weight and/or stand q30-60 mins to reposition in chair to prevent pressure related skin injury.  Re-consult [facility] for other skin / wound care concerns.          Discharge Wound Care Needs: TBD    Teaching completed with:   [] Patient           [] Family member       [] Caregiver          [] Nursing  [] Other    Patient/Caregiver Teaching:  Level of patient/caregiver understanding able to:   [] Indicates understanding       [] Needs reinforcement  [] Unsuccessful      [] Verbal Understanding  [] Demonstrated understanding       [] No evidence of learning  [] Refused teaching         [] N/A       Electronically signed by Nory Rodrigues RN on 3/13/2024 at 2:20 PM           
Chloride 91 (L) 97 - 108 mmol/L    CO2 32 21 - 32 mmol/L    Anion Gap 6 5 - 15 mmol/L    Glucose 77 65 - 100 mg/dL    BUN 4 (L) 6 - 20 mg/dL    Creatinine <0.15 (L) 0.70 - 1.30 mg/dL    Bun/Cre Ratio Not calculated 12 - 20      Est, Glom Filt Rate Not calculated >60 ml/min/1.73m2    Calcium 8.3 (L) 8.5 - 10.1 mg/dL    Phosphorus 2.7 2.6 - 4.7 mg/dL    Albumin 2.2 (L) 3.5 - 5.0 g/dL   CBC    Collection Time: 03/15/24  7:00 PM   Result Value Ref Range    WBC 11.5 (H) 4.1 - 11.1 K/uL    RBC 3.10 (L) 4.10 - 5.70 M/uL    Hemoglobin 10.1 (L) 12.1 - 17.0 g/dL    Hematocrit 30.3 (L) 36.6 - 50.3 %    MCV 97.7 80.0 - 99.0 FL    MCH 32.6 26.0 - 34.0 PG    MCHC 33.3 30.0 - 36.5 g/dL    RDW 17.0 (H) 11.5 - 14.5 %    Platelets 166 150 - 400 K/uL    MPV 9.1 8.9 - 12.9 FL    Nucleated RBCs 0.0 0.0  WBC    nRBC 0.00 0.00 - 0.01 K/uL   Protime-INR    Collection Time: 03/15/24  7:00 PM   Result Value Ref Range    Protime 14.2 11.9 - 14.6 sec    INR 1.1 0.9 - 1.1     APTT    Collection Time: 03/15/24  7:00 PM   Result Value Ref Range    APTT 35.3 (H) 21.2 - 34.1 sec    Therapeutic Range   82 - 109 sec   Heparin, Anti-Xa    Collection Time: 03/15/24  7:00 PM   Result Value Ref Range    Heparin Xa,LMWH and Unfrac <0.10 IU/mL   POCT Glucose    Collection Time: 03/16/24 12:59 AM   Result Value Ref Range    POC Glucose 147 (H) 65 - 100 mg/dL    Performed by: Yamilet Carl (CON)    Comprehensive Metabolic Panel    Collection Time: 03/16/24  2:25 AM   Result Value Ref Range    Sodium 131 (L) 136 - 145 mmol/L    Potassium 4.0 3.5 - 5.1 mmol/L    Chloride 96 (L) 97 - 108 mmol/L    CO2 33 (H) 21 - 32 mmol/L    Anion Gap 2 (L) 5 - 15 mmol/L    Glucose 145 (H) 65 - 100 mg/dL    BUN 10 6 - 20 mg/dL    Creatinine 0.29 (L) 0.70 - 1.30 mg/dL    Bun/Cre Ratio 34 (H) 12 - 20      Est, Glom Filt Rate >60 >60 ml/min/1.73m2    Calcium 8.1 (L) 8.5 - 10.1 mg/dL    Total Bilirubin 0.7 0.2 - 1.0 mg/dL    AST 17 15 - 37 U/L    ALT 15 12 - 78 U/L    
Clinical  correlation advised.     No significant thyromegaly or thyroid nodules.     Pulmonary emphysema.  Postsurgical changes from C6 laminectomy and extensive spinal fusion as  detailed.  Marked diffuse osteopenia.    MBS:  Severe pharyngeal dysphagia with penetration    FLEXIBLE FIBEROPTIC LARYNGOSCOPY (from 3/20/2024 by Dr. Shrestha)  Findings:   Nasal Cavity: Normal nasal cavity, no polyposis or purulence.  Middle meatus clear bilaterally.   Nasopharynx: No overt masses or lesions.   Base of tongue: Vallecula & epiglottis unremarkable. Clear pyriform sinus.   TVC: Vocal folds without lesions. Normal mobility.   Subglottis: Visualized subglottis appears patent.   Fullness along the posterior pharyngeal wall, and cervical osteophytes noted    Assessment/Plan:   Assessment:   Oumar Merida III is a 54 y.o. male with dysphagia.  Known cervical osteophytes with dysphagia.  Scope exam confirms, no concerning lesions worth biopsying     Plan:   CT soft tissue neck does not show any other obvious etiology of his dysphagia  His history is complicated by significant cervical spine disease, prior surgery, ankylosing spondylitis  He has lost significant weight, would be reasonable to proceed with PEG tube to improve his nutritional status  He plans to follow-up with Dr. Tobar to determine whether any revision or other procedure on cervical osteophytes would be beneficial    Plan for medical issues were discussed with patient including observation, medical management, and possible surgical/procedural intervention if they fail conservative therapy. The risks and benefits of the considered procedures were discussed with the patient. All patient questions were answered.     
Friday                ________________________________________________________________________  Signed: Jesenia Starla, MD

## 2024-03-23 ENCOUNTER — TELEPHONE (OUTPATIENT)
Facility: HOSPITAL | Age: 54
End: 2024-03-23

## 2024-03-23 NOTE — TELEPHONE ENCOUNTER
I contacted patient on 3/22//24 and discussed with him the findings of the CT scan of neck that had been finalized after his discharge. We discussed ongoing workup for the dysphagia, noting the fullness of oropharynx. He is scheduled to see his PCP next week as well as ENT and GI (for PEG)  We also discussed the incidental finding of the left lung spiculated mass and concerns for malignancy. He is followed by Pulmonology (he could not recall name but [facility] in [facility]). He is followed there every 6 months. He understood situation and will make appointment to be seen there within the next 1-2 weeks for CT chest and further evaluation of this lung lesion.  Manju Cardona MD